# Patient Record
Sex: FEMALE | Race: WHITE | Employment: UNEMPLOYED | URBAN - METROPOLITAN AREA
[De-identification: names, ages, dates, MRNs, and addresses within clinical notes are randomized per-mention and may not be internally consistent; named-entity substitution may affect disease eponyms.]

---

## 2019-01-31 ENCOUNTER — HOSPITAL ENCOUNTER (OUTPATIENT)
Dept: MAMMOGRAPHY | Age: 59
Discharge: HOME | End: 2019-01-31
Attending: FAMILY MEDICINE
Payer: COMMERCIAL

## 2019-01-31 DIAGNOSIS — Z12.39 SCREENING BREAST EXAMINATION: ICD-10-CM

## 2019-01-31 PROCEDURE — 77067 SCR MAMMO BI INCL CAD: CPT

## 2020-03-18 ENCOUNTER — HOSPITAL ENCOUNTER (OUTPATIENT)
Dept: MAMMOGRAPHY | Age: 60
Discharge: HOME | End: 2020-03-18
Attending: FAMILY MEDICINE
Payer: COMMERCIAL

## 2020-03-18 DIAGNOSIS — Z12.31 VISIT FOR SCREENING MAMMOGRAM: ICD-10-CM

## 2020-03-18 PROCEDURE — 77067 SCR MAMMO BI INCL CAD: CPT

## 2021-01-22 ENCOUNTER — HOSPITAL ENCOUNTER (EMERGENCY)
Age: 61
Discharge: HOME | End: 2021-01-23
Attending: EMERGENCY MEDICINE
Payer: COMMERCIAL

## 2021-01-22 ENCOUNTER — APPOINTMENT (OUTPATIENT)
Dept: CT IMAGING | Age: 61
End: 2021-01-22
Attending: PHYSICIAN ASSISTANT
Payer: COMMERCIAL

## 2021-01-22 ENCOUNTER — APPOINTMENT (OUTPATIENT)
Dept: GENERAL RADIOLOGY | Age: 61
End: 2021-01-22
Attending: EMERGENCY MEDICINE
Payer: COMMERCIAL

## 2021-01-22 VITALS
HEART RATE: 70 BPM | TEMPERATURE: 96.9 F | RESPIRATION RATE: 18 BRPM | DIASTOLIC BLOOD PRESSURE: 69 MMHG | OXYGEN SATURATION: 98 % | SYSTOLIC BLOOD PRESSURE: 129 MMHG

## 2021-01-22 DIAGNOSIS — S46.912A LEFT SHOULDER STRAIN, INITIAL ENCOUNTER: Primary | ICD-10-CM

## 2021-01-22 DIAGNOSIS — M48.02 CERVICAL STENOSIS OF SPINE: ICD-10-CM

## 2021-01-22 PROCEDURE — 72125 CT NECK SPINE W/O DYE: CPT

## 2021-01-22 PROCEDURE — 74011250637 HC RX REV CODE- 250/637: Performed by: PHYSICIAN ASSISTANT

## 2021-01-22 PROCEDURE — 73030 X-RAY EXAM OF SHOULDER: CPT

## 2021-01-22 RX ORDER — VENLAFAXINE 100 MG/1
150 TABLET ORAL 3 TIMES DAILY
COMMUNITY
End: 2022-03-09

## 2021-01-22 RX ORDER — HYDROCODONE BITARTRATE AND ACETAMINOPHEN 5; 325 MG/1; MG/1
1 TABLET ORAL
Status: COMPLETED | OUTPATIENT
Start: 2021-01-22 | End: 2021-01-22

## 2021-01-22 RX ADMIN — HYDROCODONE BITARTRATE AND ACETAMINOPHEN 1 TABLET: 5; 325 TABLET ORAL at 22:58

## 2021-01-23 PROCEDURE — 99283 EMERGENCY DEPT VISIT LOW MDM: CPT

## 2021-01-23 RX ORDER — HYDROCODONE BITARTRATE AND ACETAMINOPHEN 5; 325 MG/1; MG/1
1 TABLET ORAL
Qty: 18 TAB | Refills: 0 | Status: SHIPPED | OUTPATIENT
Start: 2021-01-23 | End: 2021-01-28

## 2021-01-23 NOTE — ED PROVIDER NOTES
Jose Quiroz is a 61 y.o. female with PMH presents to emergency room ambulatory for evaluation of L shoulder pain radiating into left side of her neck after she tripped and fell this evening while walking her dog. Hx of L reverse total shoulder replacement done at [facility], has limited ROM since the fall due to pain. Denies numbness/tingling, weakness, extremity swelling, head injury, headache or dizziness. She does not take a blood thinner. Reverse total shoulder arthroplasty without evidence of complicating feature. PCP: Chad Fitzgerald MD    Surgical hx- see chart  Social hx- no tobbacco    The patient has no other complaints at this time.              Past Medical History:   Diagnosis Date    Arthritis     Nausea & vomiting     RODDY (obstructive sleep apnea)     on CPAP for last 3 y ears    Psychiatric disorder     DEPRESSION    Sleep apnea     uses c pap       Past Surgical History:   Procedure Laterality Date    HX GYN      LT OOPHORECTOMY    HX ORTHOPAEDIC      LLT MENISCUS CONDROPLASTY    HX ORTHOPAEDIC      LT ROTATOR CUFF 2 TIMES RT ROTATOR 1 TIME    HX SHOULDER REPLACEMENT Left     \"reverse procedure\"         Family History:   Problem Relation Age of Onset    Psychiatric Disorder Mother     Heart Disease Father        Social History     Socioeconomic History    Marital status: SINGLE     Spouse name: Not on file    Number of children: Not on file    Years of education: Not on file    Highest education level: Not on file   Occupational History    Not on file   Social Needs    Financial resource strain: Not on file    Food insecurity     Worry: Not on file     Inability: Not on file    Transportation needs     Medical: Not on file     Non-medical: Not on file   Tobacco Use    Smoking status: Never Smoker   Substance and Sexual Activity    Alcohol use: No    Drug use: Not on file    Sexual activity: Not on file   Lifestyle    Physical activity     Days per week: Not on file Minutes per session: Not on file    Stress: Not on file   Relationships    Social connections     Talks on phone: Not on file     Gets together: Not on file     Attends Restoration service: Not on file     Active member of club or organization: Not on file     Attends meetings of clubs or organizations: Not on file     Relationship status: Not on file    Intimate partner violence     Fear of current or ex partner: Not on file     Emotionally abused: Not on file     Physically abused: Not on file     Forced sexual activity: Not on file   Other Topics Concern    Not on file   Social History Narrative    Not on file         ALLERGIES: Sulfa (sulfonamide antibiotics)    Review of Systems   Constitutional: Negative. Negative for activity change, chills, fatigue and unexpected weight change. HENT: Negative for trouble swallowing. Respiratory: Negative for cough, chest tightness, shortness of breath and wheezing. Cardiovascular: Negative. Negative for chest pain and palpitations. Gastrointestinal: Negative. Negative for abdominal pain, diarrhea, nausea and vomiting. Genitourinary: Negative. Negative for dysuria, flank pain, frequency and hematuria. Musculoskeletal: Positive for arthralgias and neck pain. Negative for back pain and neck stiffness. Skin: Negative. Negative for color change and rash. Neurological: Negative. Negative for dizziness, numbness and headaches. All other systems reviewed and are negative. Vitals:    01/22/21 2117   BP: 129/69   Pulse: 70   Resp: 18   Temp: 96.9 °F (36.1 °C)   SpO2: 98%            Physical Exam  Vitals signs and nursing note reviewed. Constitutional:       General: She is not in acute distress. Appearance: She is well-developed. She is not toxic-appearing or diaphoretic. Comments: Thin WF   HENT:      Head: Normocephalic and atraumatic. Eyes:      General:         Right eye: No discharge. Left eye: No discharge. Conjunctiva/sclera: Conjunctivae normal.      Pupils: Pupils are equal, round, and reactive to light. Neck:      Musculoskeletal: Full passive range of motion without pain, normal range of motion and neck supple. Trachea: No tracheal tenderness. Comments: TTP of middle c-spine, L paracervical region and L trap. Cardiovascular:      Rate and Rhythm: Normal rate. Pulses: Normal pulses. Pulmonary:      Effort: Pulmonary effort is normal. No respiratory distress. Breath sounds: Normal breath sounds. Abdominal:      General: Bowel sounds are normal. There is no distension. Musculoskeletal:         General: Tenderness present. Comments: L shoulder; LROM due to pain. Distal pulses 2+. NVI throughout. Skin:     General: Skin is warm and dry. Capillary Refill: Capillary refill takes less than 2 seconds. Findings: No abrasion, erythema or rash. Neurological:      Mental Status: She is alert and oriented to person, place, and time. Cranial Nerves: No cranial nerve deficit. Sensory: No sensory deficit. Coordination: Coordination normal.   Psychiatric:         Speech: Speech normal.         Behavior: Behavior normal.          MDM  Number of Diagnoses or Management Options  Cervical stenosis of spine  Left shoulder strain, initial encounter  Diagnosis management comments:   Ddx: frx, sprain, dislocation       Amount and/or Complexity of Data Reviewed  Tests in the radiology section of CPT®: ordered and reviewed  Review and summarize past medical records: yes  Discuss the patient with other providers: yes    Patient Progress  Patient progress: stable         Procedures     I discussed patient's PMH, exam findings as well as careplan with the ER attending who agrees with care plan. Eduardo Scott PA-C     Findings from CT and x-ray discussed with patient. No obvious hardware complication. Will place in sling. Incidental findings from CT of the neck discussed. Will give a few Norco to take as needed for pain and encouraged follow-up with [facility] for evaluation next week. Return precautions discussed. IMAGING RESULTS:  Xr Shoulder Lt Ap/lat Min 2 V    Result Date: 1/22/2021  1. Reverse total shoulder arthroplasty without evidence of complicating feature. Ct Spine Cerv Wo Cont    Result Date: 1/22/2021  No acute abnormality. Cervical degenerative changes with mild spinal canal stenosis at C4-5 through C6-7 and moderate left foraminal stenosis at C4-5. MEDICATIONS GIVEN:  Medications   HYDROcodone-acetaminophen (NORCO) 5-325 mg per tablet 1 Tab (1 Tab Oral Given 1/22/21 5753)         DISCHARGE NOTE:  The patient's results have been reviewed with them and/or available family. Patient and/or family verbally conveyed their understanding and agreement of the patient's signs, symptoms, diagnosis, treatment and prognosis and additionally agree to follow up as recommended in the discharge instructions or to return to the Emergency Room should their condition change prior to their follow-up appointment. The patient/family verbally agrees with the care-plan and verbally conveys that all of their questions have been answered. The discharge instructions have also been provided to the patient and/or family with some educational information regarding the patient's diagnosis as well a list of reasons why the patient would want to return to the ER prior to their follow-up appointment, should their condition change. Plan:  1. F/U with ortho at [facility]  2. Rx norco  3.  Sling given, ice, rest  Return precautions discussed and advised to return to ER if worse

## 2021-01-23 NOTE — ED TRIAGE NOTES
Pulled down walking her dog just prior to arrival.She has severe pain left shoulder.  She has had a shoulder replacement left shoulder in the past.

## 2021-01-23 NOTE — ED NOTES
Pt given written and verbal discharge instructions, 1 efiled Rx; pt verbalized understanding of such. VSS at time of discharge. Belongings in pt possession at time of discharge. Pt ambulatory out of ED without difficulty in NAD. No complaints, needs, or questions at this time. Pt to call PCP, ortho ASAP for follow-up.

## 2021-02-24 ENCOUNTER — TRANSCRIBE ORDER (OUTPATIENT)
Dept: SCHEDULING | Age: 61
End: 2021-02-24

## 2021-02-24 DIAGNOSIS — Z12.31 SCREENING MAMMOGRAM FOR HIGH-RISK PATIENT: Primary | ICD-10-CM

## 2021-03-19 ENCOUNTER — HOSPITAL ENCOUNTER (OUTPATIENT)
Dept: MAMMOGRAPHY | Age: 61
Discharge: HOME | End: 2021-03-19
Attending: FAMILY MEDICINE
Payer: COMMERCIAL

## 2021-03-19 DIAGNOSIS — Z12.31 SCREENING MAMMOGRAM FOR HIGH-RISK PATIENT: ICD-10-CM

## 2021-03-19 PROCEDURE — 77063 BREAST TOMOSYNTHESIS BI: CPT

## 2021-11-28 ENCOUNTER — APPOINTMENT (OUTPATIENT)
Dept: CT IMAGING | Age: 61
End: 2021-11-28
Attending: EMERGENCY MEDICINE
Payer: MEDICAID

## 2021-11-28 ENCOUNTER — HOSPITAL ENCOUNTER (EMERGENCY)
Age: 61
Discharge: HOME | End: 2021-11-28
Attending: STUDENT IN AN ORGANIZED HEALTH CARE EDUCATION/TRAINING PROGRAM
Payer: MEDICAID

## 2021-11-28 VITALS
SYSTOLIC BLOOD PRESSURE: 116 MMHG | OXYGEN SATURATION: 99 % | DIASTOLIC BLOOD PRESSURE: 72 MMHG | HEART RATE: 64 BPM | RESPIRATION RATE: 15 BRPM | TEMPERATURE: 97.6 F

## 2021-11-28 DIAGNOSIS — S09.90XA CLOSED HEAD INJURY, INITIAL ENCOUNTER: ICD-10-CM

## 2021-11-28 DIAGNOSIS — S01.81XA FACIAL LACERATION, INITIAL ENCOUNTER: Primary | ICD-10-CM

## 2021-11-28 PROCEDURE — 75810000293 HC SIMP/SUPERF WND  RPR

## 2021-11-28 PROCEDURE — 72125 CT NECK SPINE W/O DYE: CPT

## 2021-11-28 PROCEDURE — 74011000250 HC RX REV CODE- 250: Performed by: EMERGENCY MEDICINE

## 2021-11-28 PROCEDURE — 99283 EMERGENCY DEPT VISIT LOW MDM: CPT

## 2021-11-28 PROCEDURE — 70450 CT HEAD/BRAIN W/O DYE: CPT

## 2021-11-28 RX ORDER — BACITRACIN 500 UNIT/G
1 PACKET (EA) TOPICAL
Status: COMPLETED | OUTPATIENT
Start: 2021-11-28 | End: 2021-11-28

## 2021-11-28 RX ADMIN — BACITRACIN 1 PACKET: 500 OINTMENT TOPICAL at 12:40

## 2021-11-28 NOTE — ED PROVIDER NOTES
Please note that this dictation was completed with [employer], the computer voice recognition software.  Quite often unanticipated grammatical, syntax, homophones, and other interpretive errors are inadvertently transcribed by the computer software.  Please disregard these errors.  Please excuse any errors that have escaped final proofreading. Patient is 64year old healthy female presenting to ED for evaluation of head injury and laceration. She states that about 45 minutes prior to arrival she was walking her dog, her dog pulled her forward, causing her to fall onto the grass. She believes that she scraped her face on the edge of her glasses. She denies loss of consciousness or any anticoagulant usage, but does have a mild headache right now. States that her tetanus immunization is up-to-date. Denies any additional medical complaints at this time.            Past Medical History:   Diagnosis Date    Arthritis     Nausea & vomiting     RODDY (obstructive sleep apnea)     on CPAP for last 3 y ears    Psychiatric disorder     DEPRESSION    Sleep apnea     uses c pap       Past Surgical History:   Procedure Laterality Date    HX GYN      LT OOPHORECTOMY    HX ORTHOPAEDIC      LLT MENISCUS CONDROPLASTY    HX ORTHOPAEDIC      LT ROTATOR CUFF 2 TIMES RT ROTATOR 1 TIME    HX SHOULDER REPLACEMENT Left     \"reverse procedure\"         Family History:   Problem Relation Age of Onset    Psychiatric Disorder Mother     Heart Disease Father        Social History     Socioeconomic History    Marital status: SINGLE     Spouse name: Not on file    Number of children: Not on file    Years of education: Not on file    Highest education level: Not on file   Occupational History    Not on file   Tobacco Use    Smoking status: Never Smoker    Smokeless tobacco: Not on file   Substance and Sexual Activity    Alcohol use: No    Drug use: Not on file    Sexual activity: Not on file   Other Topics Concern    Not on file   Social History Narrative    Not on file     Social Determinants of Health     Financial Resource Strain:     Difficulty of Paying Living Expenses: Not on file   Food Insecurity:     Worried About Running Out of Food in the Last Year: Not on file    Janet of Food in the Last Year: Not on file   Transportation Needs:     Lack of Transportation (Medical): Not on file    Lack of Transportation (Non-Medical): Not on file   Physical Activity:     Days of Exercise per Week: Not on file    Minutes of Exercise per Session: Not on file   Stress:     Feeling of Stress : Not on file   Social Connections:     Frequency of Communication with Friends and Family: Not on file    Frequency of Social Gatherings with Friends and Family: Not on file    Attends Catholic Services: Not on file    Active Member of [address] [place] [address] [employer] or Organizations: Not on file    Attends Club or Organization Meetings: Not on file    Marital Status: Not on file   Intimate Partner Violence:     Fear of Current or Ex-Partner: Not on file    Emotionally Abused: Not on file    Physically Abused: Not on file    Sexually Abused: Not on file   Housing Stability:     Unable to Pay for Housing in the Last Year: Not on file    Number of [place] in the Last Year: Not on file    Unstable Housing in the Last Year: Not on file         ALLERGIES: Sulfa (sulfonamide antibiotics)    Review of Systems   Constitutional: Negative for chills and fever. HENT: Negative for ear pain and sore throat. Eyes: Negative for visual disturbance. Respiratory: Negative for cough and shortness of breath. Cardiovascular: Negative for chest pain. Gastrointestinal: Negative for abdominal pain. Genitourinary: Negative for flank pain. Musculoskeletal: Negative for back pain. Skin: Positive for wound. Negative for color change. Neurological: Positive for headaches. Negative for dizziness. Psychiatric/Behavioral: Negative for confusion.        Vitals: 11/28/21 1230   BP: 116/72   Pulse: 64   Resp: 15   Temp: 97.6 °F (36.4 °C)   SpO2: 99%            Physical Exam  Vitals and nursing note reviewed. Constitutional:       General: She is not in acute distress. Appearance: Normal appearance. She is not ill-appearing. HENT:      Head: Normocephalic. Laceration (4 cm superficial skin avulsion to the left eyebrow, bleeding controlled, no bony instability. No deep laceration.) present. No raccoon eyes, Figueredo's sign or contusion. Jaw: There is normal jaw occlusion. Right Ear: Tympanic membrane normal.      Left Ear: Tympanic membrane normal.      Mouth/Throat:      Pharynx: Oropharynx is clear. Eyes:      General: Vision grossly intact. Extraocular Movements: Extraocular movements intact. Conjunctiva/sclera: Conjunctivae normal.      Pupils: Pupils are equal, round, and reactive to light. Neck:      Trachea: Phonation normal.   Cardiovascular:      Rate and Rhythm: Normal rate and regular rhythm. Heart sounds: Normal heart sounds. Pulmonary:      Effort: Pulmonary effort is normal.      Breath sounds: Normal breath sounds and air entry. Abdominal:      Palpations: Abdomen is soft. Tenderness: There is no abdominal tenderness. Musculoskeletal:         General: Normal range of motion. Cervical back: Normal range of motion. Skin:     General: Skin is warm and dry. Neurological:      General: No focal deficit present. Mental Status: She is alert and oriented to person, place, and time. Cranial Nerves: Cranial nerves are intact. Sensory: Sensation is intact. Motor: Motor function is intact. Coordination: Coordination is intact. Gait: Gait is intact.    Psychiatric:         Attention and Perception: Attention normal.         Mood and Affect: Mood normal.         Speech: Speech normal.          MDM  Number of Diagnoses or Management Options  Closed head injury, initial encounter  Facial laceration, initial encounter  Diagnosis management comments: Patient is alert, afebrile, vitals stable. Presents after mechanical ground-level fall, no loss of consciousness or anticoagulant use. No external signs of skull fracture or focal neurologic deficits on exam.  Sustained a superficial avulsion to the left eyebrow region, likely scraped from the edge of her glasses, wound cleaned and Steri-Strips applied to part of the wound to help with cosmesis, no deep laceration requiring suturing. Bacitracin and bandage applied. Head CT clear. Patient discharged from ED and will follow up with her PCP as needed. Return precautions outlined. All questions answered at this time. ED Course as of 11/28/21 1334   Sun Nov 28, 2021   1316 CT SPINE CERV WO CONT  IMPRESSION     1. No acute osseous abnormality. 2. Multilevel degenerative spondylosis. [EP]   3930 CT HEAD WO CONT  IMPRESSION  No acute intracranial abnormality.    [EP]      ED Course User Index  [EP] ARMANI Najera       Pt has been reevaluated. There are no new complaints, changes, or physical findings at this time. All results have been reviewed with patient and/or family. Medications have been reviewed w/ pt and/or family. Pt and/or family's questions have been answered. Pt and/or family expressed good understanding of the dx/tx/rx and is in agreement with plan of care. Pt instructed and agreed to f/u w/ PCP and to return to ED upon further deterioration. Pt is ready for discharge. IMPRESSION:  1. Facial laceration, initial encounter    2. Closed head injury, initial encounter        PLAN:  1. Discharge Medication List as of 11/28/2021  1:47 PM        2.    Follow-up Information     Follow up With Specialties Details Why Contact Info    Brittni Bean MD Family Medicine Go to  As needed [place] [address]  [phone]              Return to ED if worse     Wound Closure by Adhesive    Date/Time: 11/28/2021 1:47 PM  Performed by: ARMANI Mariscal  Authorized by: ARMANI Mariscal     Consent:     Consent obtained:  Verbal    Consent given by:  Patient    Risks discussed:  Pain and poor cosmetic result    Alternatives discussed:  No treatment  Anesthesia (see MAR for exact dosages): Anesthesia method:  None  Laceration details:     Location:  Face    Face location:  L eyebrow    Length (cm):  3  Repair type:     Repair type:  Simple  Exploration:     Hemostasis achieved with:  Direct pressure    Contaminated: no    Treatment:     Area cleansed with:  Hibiclens    Amount of cleaning:  Standard    Irrigation solution:  Sterile saline    Irrigation method:  Pressure wash    Visualized foreign bodies/material removed: no    Skin repair:     Repair method:  Steri-Strips    Number of Steri-Strips:  3  Approximation:     Approximation:  Close  Post-procedure details:     Dressing:  Antibiotic ointment and adhesive bandage    Patient tolerance of procedure:   Tolerated well, no immediate complications

## 2021-11-28 NOTE — DISCHARGE INSTRUCTIONS
Lacerations: Your laceration was repaired with three steri-strips. They will fall off on their own over the next week. Keep the wound dry for 24 hours unless it gets dirty. Wash your lacerations with an antibacterial soap and water. Pat dry. Cover with a layer of antibacterial ointment and cover with bandage. Do this twice daily until healed. Once your wound has healed you should apply sunscreen over the scar for the next year to prevent further scarring while the skin fully heals.      Return to ER with any concern for wound infection: redness, significant swelling, pus-like discharge

## 2021-12-02 ENCOUNTER — TRANSCRIBE ORDER (OUTPATIENT)
Dept: REGISTRATION | Age: 61
End: 2021-12-02

## 2021-12-02 DIAGNOSIS — Z01.812 PRE-PROCEDURE LAB EXAM: Primary | ICD-10-CM

## 2021-12-07 ENCOUNTER — HOSPITAL ENCOUNTER (OUTPATIENT)
Dept: PREADMISSION TESTING | Age: 61
Discharge: HOME | End: 2021-12-07
Attending: PODIATRIST
Payer: MEDICAID

## 2021-12-07 DIAGNOSIS — Z01.812 PRE-PROCEDURE LAB EXAM: ICD-10-CM

## 2021-12-07 PROCEDURE — U0005 INFEC AGEN DETEC AMPLI PROBE: HCPCS

## 2021-12-08 LAB
SARS-COV-2, XPLCVT: NOT DETECTED
SOURCE, COVRS: NORMAL

## 2021-12-09 ENCOUNTER — ANESTHESIA EVENT (OUTPATIENT)
Dept: SURGERY | Age: 61
End: 2021-12-09
Payer: MEDICAID

## 2021-12-10 ENCOUNTER — ANESTHESIA (OUTPATIENT)
Dept: SURGERY | Age: 61
End: 2021-12-10
Payer: MEDICAID

## 2021-12-10 ENCOUNTER — HOSPITAL ENCOUNTER (OUTPATIENT)
Age: 61
Setting detail: OUTPATIENT SURGERY
Discharge: HOME | End: 2021-12-10
Attending: PODIATRIST | Admitting: PODIATRIST
Payer: MEDICAID

## 2021-12-10 ENCOUNTER — APPOINTMENT (OUTPATIENT)
Dept: GENERAL RADIOLOGY | Age: 61
End: 2021-12-10
Attending: PODIATRIST
Payer: MEDICAID

## 2021-12-10 VITALS
WEIGHT: 135 LBS | TEMPERATURE: 98.1 F | OXYGEN SATURATION: 92 % | SYSTOLIC BLOOD PRESSURE: 93 MMHG | BODY MASS INDEX: 21.69 KG/M2 | RESPIRATION RATE: 12 BRPM | DIASTOLIC BLOOD PRESSURE: 56 MMHG | HEIGHT: 66 IN | HEART RATE: 63 BPM

## 2021-12-10 LAB — HGB BLD-MCNC: 14.1 G/DL (ref 11.5–16)

## 2021-12-10 PROCEDURE — 74011250636 HC RX REV CODE- 250/636: Performed by: PODIATRIST

## 2021-12-10 PROCEDURE — C1769 GUIDE WIRE: HCPCS | Performed by: PODIATRIST

## 2021-12-10 PROCEDURE — 76210000006 HC OR PH I REC 0.5 TO 1 HR: Performed by: PODIATRIST

## 2021-12-10 PROCEDURE — 77030031139 HC SUT VCRL2 J&J -A: Performed by: PODIATRIST

## 2021-12-10 PROCEDURE — 77030020754 HC CUF TRNQT 2BLA STRY -B: Performed by: PODIATRIST

## 2021-12-10 PROCEDURE — 77030002916 HC SUT ETHLN J&J -A: Performed by: PODIATRIST

## 2021-12-10 PROCEDURE — 76210000020 HC REC RM PH II FIRST 0.5 HR: Performed by: PODIATRIST

## 2021-12-10 PROCEDURE — 74011250636 HC RX REV CODE- 250/636: Performed by: ANESTHESIOLOGY

## 2021-12-10 PROCEDURE — 77030034564: Performed by: PODIATRIST

## 2021-12-10 PROCEDURE — 77030002933 HC SUT MCRYL J&J -A: Performed by: PODIATRIST

## 2021-12-10 PROCEDURE — 74011250637 HC RX REV CODE- 250/637: Performed by: ANESTHESIOLOGY

## 2021-12-10 PROCEDURE — 77030010396 HC WRE FIX C CNMD -A: Performed by: PODIATRIST

## 2021-12-10 PROCEDURE — 74011000250 HC RX REV CODE- 250: Performed by: ANESTHESIOLOGY

## 2021-12-10 PROCEDURE — 77030000032 HC CUF TRNQT ZIMM -B: Performed by: PODIATRIST

## 2021-12-10 PROCEDURE — 77030003748: Performed by: PODIATRIST

## 2021-12-10 PROCEDURE — 77030013079 HC BLNKT BAIR HGGR 3M -A: Performed by: ANESTHESIOLOGY

## 2021-12-10 PROCEDURE — 77030040922 HC BLNKT HYPOTHRM STRY -A

## 2021-12-10 PROCEDURE — 2709999900 HC NON-CHARGEABLE SUPPLY: Performed by: PODIATRIST

## 2021-12-10 PROCEDURE — 76060000035 HC ANESTHESIA 2 TO 2.5 HR: Performed by: PODIATRIST

## 2021-12-10 PROCEDURE — 85018 HEMOGLOBIN: CPT

## 2021-12-10 PROCEDURE — 74011250636 HC RX REV CODE- 250/636: Performed by: NURSE ANESTHETIST, CERTIFIED REGISTERED

## 2021-12-10 PROCEDURE — 74011000250 HC RX REV CODE- 250: Performed by: PODIATRIST

## 2021-12-10 PROCEDURE — C1713 ANCHOR/SCREW BN/BN,TIS/BN: HCPCS | Performed by: PODIATRIST

## 2021-12-10 PROCEDURE — 77030006773 HC BLD SAW OSC BRSM -A: Performed by: PODIATRIST

## 2021-12-10 PROCEDURE — 76010000153 HC OR TIME 1.5 TO 2 HR: Performed by: PODIATRIST

## 2021-12-10 DEVICE — SCREW BNE L18MM DIA2.5MM MIC FT ANK TI SELF DRL ST CANN: Type: IMPLANTABLE DEVICE | Site: FOOT | Status: FUNCTIONAL

## 2021-12-10 DEVICE — SCR BNE COMP FT MIC 2.5X16MM --: Type: IMPLANTABLE DEVICE | Site: FOOT | Status: FUNCTIONAL

## 2021-12-10 RX ORDER — BUPIVACAINE HYDROCHLORIDE 5 MG/ML
INJECTION, SOLUTION EPIDURAL; INTRACAUDAL AS NEEDED
Status: DISCONTINUED | OUTPATIENT
Start: 2021-12-10 | End: 2021-12-10 | Stop reason: HOSPADM

## 2021-12-10 RX ORDER — ONDANSETRON 2 MG/ML
INJECTION INTRAMUSCULAR; INTRAVENOUS AS NEEDED
Status: DISCONTINUED | OUTPATIENT
Start: 2021-12-10 | End: 2021-12-10 | Stop reason: HOSPADM

## 2021-12-10 RX ORDER — ROPIVACAINE HYDROCHLORIDE 5 MG/ML
30 INJECTION, SOLUTION EPIDURAL; INFILTRATION; PERINEURAL AS NEEDED
Status: DISCONTINUED | OUTPATIENT
Start: 2021-12-10 | End: 2021-12-10 | Stop reason: HOSPADM

## 2021-12-10 RX ORDER — PHENYLEPHRINE HCL IN 0.9% NACL 0.4MG/10ML
SYRINGE (ML) INTRAVENOUS AS NEEDED
Status: DISCONTINUED | OUTPATIENT
Start: 2021-12-10 | End: 2021-12-10 | Stop reason: HOSPADM

## 2021-12-10 RX ORDER — HYDROMORPHONE HYDROCHLORIDE 1 MG/ML
0.2 INJECTION, SOLUTION INTRAMUSCULAR; INTRAVENOUS; SUBCUTANEOUS
Status: DISCONTINUED | OUTPATIENT
Start: 2021-12-10 | End: 2021-12-10 | Stop reason: HOSPADM

## 2021-12-10 RX ORDER — MIDAZOLAM HYDROCHLORIDE 1 MG/ML
1 INJECTION, SOLUTION INTRAMUSCULAR; INTRAVENOUS AS NEEDED
Status: DISCONTINUED | OUTPATIENT
Start: 2021-12-10 | End: 2021-12-10 | Stop reason: HOSPADM

## 2021-12-10 RX ORDER — SODIUM CHLORIDE 9 MG/ML
25 INJECTION, SOLUTION INTRAVENOUS CONTINUOUS
Status: DISCONTINUED | OUTPATIENT
Start: 2021-12-10 | End: 2021-12-10 | Stop reason: HOSPADM

## 2021-12-10 RX ORDER — PROPOFOL 10 MG/ML
INJECTION, EMULSION INTRAVENOUS AS NEEDED
Status: DISCONTINUED | OUTPATIENT
Start: 2021-12-10 | End: 2021-12-10 | Stop reason: HOSPADM

## 2021-12-10 RX ORDER — FENTANYL CITRATE 50 UG/ML
50 INJECTION, SOLUTION INTRAMUSCULAR; INTRAVENOUS AS NEEDED
Status: DISCONTINUED | OUTPATIENT
Start: 2021-12-10 | End: 2021-12-10 | Stop reason: HOSPADM

## 2021-12-10 RX ORDER — ONDANSETRON 2 MG/ML
4 INJECTION INTRAMUSCULAR; INTRAVENOUS AS NEEDED
Status: DISCONTINUED | OUTPATIENT
Start: 2021-12-10 | End: 2021-12-10 | Stop reason: HOSPADM

## 2021-12-10 RX ORDER — SODIUM CHLORIDE, SODIUM LACTATE, POTASSIUM CHLORIDE, CALCIUM CHLORIDE 600; 310; 30; 20 MG/100ML; MG/100ML; MG/100ML; MG/100ML
1000 INJECTION, SOLUTION INTRAVENOUS CONTINUOUS
Status: DISCONTINUED | OUTPATIENT
Start: 2021-12-10 | End: 2021-12-10 | Stop reason: HOSPADM

## 2021-12-10 RX ORDER — DIPHENHYDRAMINE HYDROCHLORIDE 50 MG/ML
12.5 INJECTION, SOLUTION INTRAMUSCULAR; INTRAVENOUS AS NEEDED
Status: DISCONTINUED | OUTPATIENT
Start: 2021-12-10 | End: 2021-12-10 | Stop reason: HOSPADM

## 2021-12-10 RX ORDER — MORPHINE SULFATE 2 MG/ML
2 INJECTION, SOLUTION INTRAMUSCULAR; INTRAVENOUS
Status: DISCONTINUED | OUTPATIENT
Start: 2021-12-10 | End: 2021-12-10 | Stop reason: HOSPADM

## 2021-12-10 RX ORDER — FENTANYL CITRATE 50 UG/ML
INJECTION, SOLUTION INTRAMUSCULAR; INTRAVENOUS AS NEEDED
Status: DISCONTINUED | OUTPATIENT
Start: 2021-12-10 | End: 2021-12-10 | Stop reason: HOSPADM

## 2021-12-10 RX ORDER — MIDAZOLAM HYDROCHLORIDE 1 MG/ML
INJECTION, SOLUTION INTRAMUSCULAR; INTRAVENOUS AS NEEDED
Status: DISCONTINUED | OUTPATIENT
Start: 2021-12-10 | End: 2021-12-10 | Stop reason: HOSPADM

## 2021-12-10 RX ORDER — GLYCOPYRROLATE 0.2 MG/ML
0.2 INJECTION INTRAMUSCULAR; INTRAVENOUS
Status: DISCONTINUED | OUTPATIENT
Start: 2021-12-10 | End: 2021-12-10 | Stop reason: HOSPADM

## 2021-12-10 RX ORDER — LIDOCAINE HYDROCHLORIDE 10 MG/ML
INJECTION INFILTRATION; PERINEURAL AS NEEDED
Status: DISCONTINUED | OUTPATIENT
Start: 2021-12-10 | End: 2021-12-10 | Stop reason: HOSPADM

## 2021-12-10 RX ORDER — FENTANYL CITRATE 50 UG/ML
25 INJECTION, SOLUTION INTRAMUSCULAR; INTRAVENOUS
Status: DISCONTINUED | OUTPATIENT
Start: 2021-12-10 | End: 2021-12-10 | Stop reason: HOSPADM

## 2021-12-10 RX ORDER — MIDAZOLAM HYDROCHLORIDE 1 MG/ML
0.5 INJECTION, SOLUTION INTRAMUSCULAR; INTRAVENOUS
Status: DISCONTINUED | OUTPATIENT
Start: 2021-12-10 | End: 2021-12-10 | Stop reason: HOSPADM

## 2021-12-10 RX ORDER — DEXAMETHASONE SODIUM PHOSPHATE 4 MG/ML
INJECTION, SOLUTION INTRA-ARTICULAR; INTRALESIONAL; INTRAMUSCULAR; INTRAVENOUS; SOFT TISSUE AS NEEDED
Status: DISCONTINUED | OUTPATIENT
Start: 2021-12-10 | End: 2021-12-10 | Stop reason: HOSPADM

## 2021-12-10 RX ORDER — LIDOCAINE HYDROCHLORIDE 10 MG/ML
0.1 INJECTION, SOLUTION EPIDURAL; INFILTRATION; INTRACAUDAL; PERINEURAL AS NEEDED
Status: DISCONTINUED | OUTPATIENT
Start: 2021-12-10 | End: 2021-12-10 | Stop reason: HOSPADM

## 2021-12-10 RX ORDER — HYDROCODONE BITARTRATE AND ACETAMINOPHEN 5; 325 MG/1; MG/1
1 TABLET ORAL AS NEEDED
Status: DISCONTINUED | OUTPATIENT
Start: 2021-12-10 | End: 2021-12-10 | Stop reason: HOSPADM

## 2021-12-10 RX ORDER — ALBUTEROL SULFATE 0.83 MG/ML
2.5 SOLUTION RESPIRATORY (INHALATION) AS NEEDED
Status: DISCONTINUED | OUTPATIENT
Start: 2021-12-10 | End: 2021-12-10 | Stop reason: HOSPADM

## 2021-12-10 RX ORDER — PROPOFOL 10 MG/ML
INJECTION, EMULSION INTRAVENOUS
Status: DISCONTINUED | OUTPATIENT
Start: 2021-12-10 | End: 2021-12-10 | Stop reason: HOSPADM

## 2021-12-10 RX ORDER — LIDOCAINE HYDROCHLORIDE 20 MG/ML
INJECTION, SOLUTION EPIDURAL; INFILTRATION; INTRACAUDAL; PERINEURAL AS NEEDED
Status: DISCONTINUED | OUTPATIENT
Start: 2021-12-10 | End: 2021-12-10 | Stop reason: HOSPADM

## 2021-12-10 RX ORDER — ACETAMINOPHEN 325 MG/1
650 TABLET ORAL ONCE
Status: COMPLETED | OUTPATIENT
Start: 2021-12-10 | End: 2021-12-10

## 2021-12-10 RX ADMIN — PROPOFOL 50 MG: 10 INJECTION, EMULSION INTRAVENOUS at 08:49

## 2021-12-10 RX ADMIN — PROPOFOL 25 MG: 10 INJECTION, EMULSION INTRAVENOUS at 07:51

## 2021-12-10 RX ADMIN — PROPOFOL 25 MG: 10 INJECTION, EMULSION INTRAVENOUS at 08:01

## 2021-12-10 RX ADMIN — FENTANYL CITRATE 50 MCG: 50 INJECTION, SOLUTION INTRAMUSCULAR; INTRAVENOUS at 07:53

## 2021-12-10 RX ADMIN — Medication 40 MCG: at 08:15

## 2021-12-10 RX ADMIN — MIDAZOLAM HYDROCHLORIDE 2 MG: 1 INJECTION, SOLUTION INTRAMUSCULAR; INTRAVENOUS at 07:36

## 2021-12-10 RX ADMIN — WATER 2 G: 1 INJECTION INTRAMUSCULAR; INTRAVENOUS; SUBCUTANEOUS at 07:49

## 2021-12-10 RX ADMIN — Medication 80 MCG: at 08:16

## 2021-12-10 RX ADMIN — ONDANSETRON HYDROCHLORIDE 4 MG: 2 INJECTION, SOLUTION INTRAMUSCULAR; INTRAVENOUS at 09:05

## 2021-12-10 RX ADMIN — MEPERIDINE HYDROCHLORIDE 12.5 MG: 50 INJECTION INTRAMUSCULAR; INTRAVENOUS; SUBCUTANEOUS at 08:54

## 2021-12-10 RX ADMIN — LIDOCAINE HYDROCHLORIDE 40 MG: 20 INJECTION, SOLUTION EPIDURAL; INFILTRATION; INTRACAUDAL; PERINEURAL at 07:45

## 2021-12-10 RX ADMIN — PROPOFOL 50 MCG/KG/MIN: 10 INJECTION, EMULSION INTRAVENOUS at 07:45

## 2021-12-10 RX ADMIN — FENTANYL CITRATE 25 MCG: 50 INJECTION, SOLUTION INTRAMUSCULAR; INTRAVENOUS at 07:48

## 2021-12-10 RX ADMIN — MIDAZOLAM HYDROCHLORIDE 2 MG: 1 INJECTION, SOLUTION INTRAMUSCULAR; INTRAVENOUS at 07:40

## 2021-12-10 RX ADMIN — PROPOFOL 25 MG: 10 INJECTION, EMULSION INTRAVENOUS at 07:45

## 2021-12-10 RX ADMIN — PROPOFOL 25 MG: 10 INJECTION, EMULSION INTRAVENOUS at 07:46

## 2021-12-10 RX ADMIN — PROPOFOL 25 MG: 10 INJECTION, EMULSION INTRAVENOUS at 07:49

## 2021-12-10 RX ADMIN — SODIUM CHLORIDE, POTASSIUM CHLORIDE, SODIUM LACTATE AND CALCIUM CHLORIDE: 600; 310; 30; 20 INJECTION, SOLUTION INTRAVENOUS at 09:03

## 2021-12-10 RX ADMIN — PROPOFOL 25 MG: 10 INJECTION, EMULSION INTRAVENOUS at 07:48

## 2021-12-10 RX ADMIN — FENTANYL CITRATE 25 MCG: 50 INJECTION, SOLUTION INTRAMUSCULAR; INTRAVENOUS at 07:36

## 2021-12-10 RX ADMIN — MEPERIDINE HYDROCHLORIDE 12.5 MG: 50 INJECTION INTRAMUSCULAR; INTRAVENOUS; SUBCUTANEOUS at 08:50

## 2021-12-10 RX ADMIN — DEXAMETHASONE SODIUM PHOSPHATE 4 MG: 4 INJECTION, SOLUTION INTRAMUSCULAR; INTRAVENOUS at 08:12

## 2021-12-10 RX ADMIN — SODIUM CHLORIDE, POTASSIUM CHLORIDE, SODIUM LACTATE AND CALCIUM CHLORIDE 1000 ML: 600; 310; 30; 20 INJECTION, SOLUTION INTRAVENOUS at 06:39

## 2021-12-10 RX ADMIN — ACETAMINOPHEN 650 MG: 325 TABLET ORAL at 06:39

## 2021-12-10 RX ADMIN — MIDAZOLAM HYDROCHLORIDE 1 MG: 1 INJECTION, SOLUTION INTRAMUSCULAR; INTRAVENOUS at 07:45

## 2021-12-10 NOTE — BRIEF OP NOTE
Brief Postoperative Note    Patient: Michael Rai  YOB: 1960  MRN: [id number]    Date of Procedure: 12/10/2021     Pre-Op Diagnosis: HALLUX VALGIS RIGHT FOOT, HAMMERTOE RIGHT FOOT, RIGHT FOOT PAIN    Post-Op Diagnosis: Same as preoperative diagnosis. Procedure(s):  RIGHT BUNIONECTOMY WITH OSTEOTOMY, RIGHT SECOND AND THIRD HAMMERTOE REPAIR WITH PINNING  Surgeon(s):  Joseph Richards DPM    Surgical Assistant: Dale Pantoja    Anesthesia: MAC     Estimated Blood Loss (mL): Minimal    Complications: None    Specimens: * No specimens in log *     Implants:   Implant Name Type Inv.  Item Serial No.  Lot No. LRB No. Used Action   SCR BNE COMP FT NIRMAL 2.5X16MM --  - SN/A  SCR BNE COMP FT NIRMAL 2.5X16MM --  N/A [employer] PER- Right 1 Implanted   SCR BNE COMP FT NIRMAL 2.5X18MM --  - SN/A  SCR BNE COMP FT NIRMAL 2.5X18MM --  N/A [employer] PER- Right 1 Implanted       Drains: * No LDAs found *    Findings: hammertoes right 2nd and 3rd toes right, hallux valgus right foot    Electronically Signed by Kuldeep Hairston DPM on 12/10/2021 at 9:47 AM

## 2021-12-10 NOTE — BRIEF OP NOTE
Brief Postoperative Note    Patient: Keyshawn Edward  YOB: 1960  MRN: [id number]    Date of Procedure: 12/10/2021     Pre-Op Diagnosis:   1. HALLUX VALGuS RIGHT FOOT  2. HAMMERTOE 2 RIGHT FOOT  3. Hammertoe 3,, RIGHT FOOT   4. Pain in right foot    Post-Op Diagnosis: Same as preoperative diagnosis. Procedure(s):  1. RIGHT BUNIONECTOMY WITH OSTEOTOMY,   2. RIGHT SECOND Hammertoe arthrodesis with pinning  3. THIRD HAMMERTOE PIPJ arthrodesis WITH PINNING  Surgeon(s):  Chen Bullard DPM    Surgical Assistant: Indu Wilson DPM (Fellow)    Anesthesia: MAC with 20 cc of 1% lidocaine plain    Estimated Blood Loss (mL): minimal    Complications: none    Specimens: none    Implants:   Implant Name Type Inv. Item Serial No.  Lot No. LRB No. Used Action   SCR BNE COMP FT NIRMAL 2.5X16MM --  - SN/A  SCR BNE COMP FT NIRMAL 2.5X16MM --  N/A [employer]- Right 1 Implanted   SCR BNE COMP FT NIRMAL 2.5X18MM --  - SN/A  SCR BNE COMP FT NIRMAL 2.5X18MM --  N/A [employer] PER- Right 1 Implanted       Drains: none    Findings: HAV and hammertoes.  Healthy cartilage of 1st MTPJ    Electronically Signed by Ana M Ace MD on 12/10/2021 at 9:39 AM

## 2021-12-10 NOTE — H&P
Foot and Ankle Surgery History and Physical    Subjective:      Nilsa Khan is a 64 y.o. female who presents for surgery for right foot bunionectomy and hammertoe correction and contracture of metatarsophalangeal joints 2 and 3 on the right foot. She has been known to Dr. Alis Varghese for some time now. She is followed up with him in the office for these issues. Multiple conservative treatments have been tried and failed. She endorses pain with activities at the bunion and at the top of her \"knuckles\" of toes 2 and 3 on the right foot. Denies any other medical problems besides depression. She has never smoked cigarettes. Past Medical History:   Diagnosis Date    Arthritis     Nausea & vomiting     RODDY (obstructive sleep apnea)     on CPAP for last 3 y ears    Psychiatric disorder     DEPRESSION    Sleep apnea     uses c pap     Past Surgical History:   Procedure Laterality Date    HX GYN      LT OOPHORECTOMY    HX ORTHOPAEDIC      LLT MENISCUS CONDROPLASTY    HX ORTHOPAEDIC      LT ROTATOR CUFF 2 TIMES RT ROTATOR 1 TIME    HX SHOULDER REPLACEMENT Left     \"reverse procedure\"      Family History   Problem Relation Age of Onset    Psychiatric Disorder Mother     Heart Disease Father      Social History     Tobacco Use    Smoking status: Never Smoker    Smokeless tobacco: Never Used   Substance Use Topics    Alcohol use: No      Prior to Admission medications    Medication Sig Start Date End Date Taking? Authorizing Provider   venlafaxine (EFFEXOR) 100 mg tablet Take 150 mg by mouth three (3) times daily. Yes Other, MD Luna   bupropion HCl (WELLBUTRIN PO) Take  by mouth. Yes Other, MD Luna   LAMOTRIGINE (LAMICTAL PO) Take 300 mg by mouth every morning.    Yes Provider, Historical      Allergies   Allergen Reactions    Sulfa (Sulfonamide Antibiotics) Unknown (comments)     Eyes turned bright red       Review of Systems   Neg except as reviewed above      Objective:     Patient Vitals for the past 8 hrs:   BP Temp Pulse Resp SpO2 Height Weight   12/10/21 0556 103/66 98.1 °F (36.7 °C) 61 17 96 % 5' 5.5\" (1.664 m) 61.2 kg (135 lb)       Temp (24hrs), Av.1 °F (36.7 °C), Min:98.1 °F (36.7 °C), Max:98.1 °F (36.7 °C)      Physical Exam   Gen: A&Ox3  Resp: NLB on RA  Cards: HR normal, pulses palpable  Abd: NT / ND    Right foot hallux valgus. Not hypermobile. Hammertoe contractures of toes 2 and 3 that are flexible. Flexible contracture of metatarsophalangeal joints 2 and 3. Callusing dorsal erythema dorsal aspect of PIPJ's of toes 2 and 3 and over medial eminence. Assessment:     #Right foot Bunion  #Right foot hallux valgus  #Right foot hammertoe 2  #Right foot Hammertoe 3  #Right foot MTPJ contracture 2  #Right foot MTPJ contracture 3      Plan:     Plan for surgical correction of right foot bunion with chevron osteotomy and arthroplasty versus arthrodesis toes 2 and 3 with release of metatarsophalangeal joints 2 and 3 with K wire fixation    Discussed the risk of surgery including but not limited to nonunion, malunion, infection, pain/swelling, damage to local nerves and blood vessels, DVT/PE, MI, loss of limb, loss of life,  and the risks of general anesthetic. The patient understands the risks; any and all questions were answered to the patient's satisfaction.       THIS IS THE FOOT AND ANKLE SURGERY SERVICE    Jakob Martinez DPM, [facility]  Foot and Ankle Surgery Fellow  [place] [facility] Reconstructive, Revision, and Limb Preservation Surgery of the Foot and Ankle

## 2021-12-10 NOTE — OP NOTES
Foot and Ankle Surgery Operative Report    Date of Procedure: 12/10/2021     Pre-Op Diagnosis:   1. HALLUX VALGuS RIGHT FOOT  2. HAMMERTOE 2 RIGHT FOOT  3. Hammertoe 3,, RIGHT FOOT   4. Pain in right foot    Post-Op Diagnosis: Same as preoperative diagnosis. Procedure(s):  1. RIGHT BUNIONECTOMY WITH OSTEOTOMY,   2. RIGHT SECOND Hammertoe arthrodesis with pinning  3. THIRD HAMMERTOE PIPJ arthrodesis WITH PINNING  Surgeon(s):  Kevin Martin DPM    Surgical Assistant: Omer Lion DPM (Fellow)    Anesthesia: MAC with 20 cc of 1% lidocaine plain    Estimated Blood Loss (mL): minimal    Complications: none    Specimens: none    Implants:   Implant Name Type Inv. Item Serial No.  Lot No. LRB No. Used Action   SCR BNE COMP FT NIRMAL 2.5X16MM --  - SN/A  SCR BNE COMP FT NIRMAL 2.5X16MM --  N/A [employer] PER- Right 1 Implanted   SCR BNE COMP FT NIRMAL 2.5X18MM --  - SN/A  SCR BNE COMP FT NIRMAL 2.5X18MM --  N/A [employer] PER- Right 1 Implanted       Drains: none    Findings: HAV and hammertoes. Healthy cartilage of 1st MTPJ    Indications for surgery: Horacio Olson is a 10-CRRU-IAD lady otherwise healthy who is been complaining of painful right bunion and painful hammertoes 2 and 3 on the right foot for extensive period of time. Multiple conservative interventions have been tried and failed. She was consented in the preoperative area. All questions were answered no guarantees were given. Patient was brought to the operating room placed on the operating table in supine position. An ankle tourniquet was applied and padded copiously. Surgical timeout ensued according to standard protocol. Local anesthesia was infiltrated with 20 cc of 1% lidocaine plain. The patient's operative extremity was then scrubbed, prepped, and draped in the usual aseptic manner.     Attention was directed to the operative foot where hallux valgus deformity was noted with hammertoe contractures of the PIPJ of toes 2 and 3 on the same foot. We began with a dorsal incision just para medial to the EHL tendon. We deepened our dissection to the subcutaneous structures bluntly and then made a bold incision down to bone. We elevated the periosteal capsular structures dorsally medially and laterally. We then retracted the EHL tendon medially and performed a lateral release with a 15 blade. We performed a lateral capsulotomy and then we released the attachments to the fibular sesamoid as well as the adductor tendon. We then turned our attention back to the the medial eminence. We utilized a McGlamry elevator to free up all of the plantar attachments. Then with good retraction we resected the medial eminence. Then using an axis guide oriented toward the head of the second metatarsal based on her preoperative parabola we made a a long arm chevron osteotomy through and through. We then translated the capital fragment laterally and we achieved temporary fixation with 3 K wires. We then followed standard fixation technique with Arthrex 2.5mm, FT screws x2. We confirmed our positioning both clinically and radiographically. We then performed a capsulorrhaphy for hallux abductus and confirmed complete resolution of our deformity. We then turned our attention toward to toe to where a hammertoe contracture was obvious at the PIPJ. We made a linear incision through skin and deepened our dissection to the subcutaneous tissues. We elevated the skin medially and laterally. We then performed a transverse extensor tenotomy and performed PIPJ capsulotomy was standard J stroke technique releasing the the collateral ligaments. We then used a sagittal saw to resect the head of the proximal phalanx at the surgical neck and resected just the cartilage on articular surface off of the base of the middle phalanx.   We then fixated this and achieved compression with a 0.045 K wire in standard fashion thrown antegrade and then retrograded back into the base of the proximal phalanx. We confirmed our positioning both clinically and radiographically and found this to be excellent. We then did the same exact procedure to toe 3. We made a standard in linear incision. We and deepened our dissection to the subcutaneous structures. We performed an extensor tenotomy and then a PIPJ capsulotomy where we released the collateral ligaments. We then used a sagittal saw to resect the head of the proximal phalanx at the surgical neck in the articular surface of the base of the middle phalanx. We then through a 0.045 K wire antegrade and then retrograde in standard fashion and confirmed our positioning in both clinically and radiographically and found to be excellent. We then noted at this point that the patient did not require metatarsophalangeal joint release and then we had we had achieved complete resolution of the deformity. We irrigated with copious saline we closed the extensor tendons and the capsule and fascial structures with 3-0 Vicryl. We closed the subcutaneous doctors with 4-0 Vicryl and the skin with 4-0 nylon in a mattress fashion. The K wires were bent and cut. The incisions were dressed with Xeroform and sterile 4 x 4's Vik and a Coban mildly compressive fashion. The patient was awoken and transferred to the stretcher and transported to the recovery area with vital signs stable neurovascular status intact to the operative extremity. After period of postoperative monitoring, she will be discharged home.     Dayne Donald DPM, AACFAS  Foot and Ankle Surgery Fellow  [facility] Revision, [facility] Limb Preservation Surgery of the Foot and Ankle    THIS IS THE FOOT AND ANKLE SURGERY SERVICE

## 2021-12-10 NOTE — ANESTHESIA PREPROCEDURE EVALUATION
Relevant Problems   NEUROLOGY   (+) Depression      ENDOCRINE   (+) Obesity       Anesthetic History   No history of anesthetic complications            Review of Systems / Medical History  Patient summary reviewed, nursing notes reviewed and pertinent labs reviewed    Pulmonary        Sleep apnea: CPAP           Neuro/Psych   Within defined limits           Cardiovascular  Within defined limits                Exercise tolerance: >4 METS     GI/Hepatic/Renal  Within defined limits              Endo/Other        Arthritis     Other Findings              Physical Exam    Airway  Mallampati: II  TM Distance: > 6 cm  Neck ROM: normal range of motion   Mouth opening: Normal     Cardiovascular  Regular rate and rhythm,  S1 and S2 normal,  no murmur, click, rub, or gallop             Dental  No notable dental hx       Pulmonary  Breath sounds clear to auscultation               Abdominal  GI exam deferred       Other Findings            Anesthetic Plan    ASA: 2  Anesthesia type: MAC          Induction: Intravenous  Anesthetic plan and risks discussed with: Patient

## 2021-12-10 NOTE — ANESTHESIA POSTPROCEDURE EVALUATION
Procedure(s):  RIGHT BUNIONECTOMY WITH OSTEOTOMY, RIGHT SECOND AND THIRD HAMMERTOE REPAIR WITH PINNING, RIGHT SECOND AND THIRD METATARSOPHALANGEAL JOINT RELEASE. MAC    Anesthesia Post Evaluation        Patient location during evaluation: PACU  Note status: Adequate. Level of consciousness: responsive to verbal stimuli and sleepy but conscious  Pain management: satisfactory to patient  Airway patency: patent  Anesthetic complications: no  Cardiovascular status: acceptable  Respiratory status: acceptable  Hydration status: acceptable  Comments: +Post-Anesthesia Evaluation and Assessment    Patient: Camila Feliz MRN: [id number]  SSN: [id number]   YOB: 1960  Age: 64 y.o. Sex: female          Cardiovascular Function/Vital Signs    BP (!) 93/56   Pulse 63   Temp 36.7 °C (98.1 °F)   Resp 12   Ht 5' 5.5\" (1.664 m)   Wt 61.2 kg (135 lb)   SpO2 92%   BMI 22.12 kg/m²     Patient is status post Procedure(s):  RIGHT BUNIONECTOMY WITH OSTEOTOMY, RIGHT SECOND AND THIRD HAMMERTOE REPAIR WITH PINNING, RIGHT SECOND AND THIRD METATARSOPHALANGEAL JOINT RELEASE. Nausea/Vomiting: Controlled. Postoperative hydration reviewed and adequate. Pain:  Pain Scale 1: Numeric (0 - 10) (12/10/21 1015)  Pain Intensity 1: 0 (12/10/21 1015)   Managed. Neurological Status:   Neuro (WDL): Within Defined Limits (12/10/21 1015)   At baseline. Mental Status and Level of Consciousness: Arousable. Pulmonary Status:   O2 Device: None (Room air) (12/10/21 1030)   Adequate oxygenation and airway patent. Complications related to anesthesia: None    Post-anesthesia assessment completed. No concerns. I have evaluated the patient and the patient is stable and ready to be discharged from PACU .     Signed By: Sonu Tapia MD    12/10/2021        INITIAL Post-op Vital signs:   Vitals Value Taken Time   BP 93/56 12/10/21 1045   Temp 36.7 °C (98.1 °F) 12/10/21 1015   Pulse 65 12/10/21 1051   Resp 15 12/10/21 1051 SpO2 94 % 12/10/21 1051   Vitals shown include unvalidated device data.

## 2022-01-10 ENCOUNTER — OFFICE VISIT (OUTPATIENT)
Dept: ORTHOPEDIC SURGERY | Age: 62
End: 2022-01-10
Payer: MEDICAID

## 2022-01-10 VITALS — HEIGHT: 66 IN | BODY MASS INDEX: 20.89 KG/M2 | WEIGHT: 130 LBS

## 2022-01-10 DIAGNOSIS — T84.84XA PAIN DUE TO KNEE JOINT PROSTHESIS, INITIAL ENCOUNTER (HCC): ICD-10-CM

## 2022-01-10 DIAGNOSIS — Z96.659 PAIN DUE TO KNEE JOINT PROSTHESIS, INITIAL ENCOUNTER (HCC): ICD-10-CM

## 2022-01-10 DIAGNOSIS — Z96.653 STATUS POST BILATERAL KNEE REPLACEMENTS: Primary | ICD-10-CM

## 2022-01-10 PROCEDURE — 99203 OFFICE O/P NEW LOW 30 MIN: CPT | Performed by: ORTHOPAEDIC SURGERY

## 2022-01-10 RX ORDER — METHYLPHENIDATE HYDROCHLORIDE 5 MG/1
TABLET ORAL 3 TIMES DAILY
COMMUNITY

## 2022-01-10 NOTE — PROGRESS NOTES
Olamide Marlow (: ) is a 64 y.o. female patient, here for evaluation of the following chief complaint(s):  Knee Pain (bilateral knee pain)       ASSESSMENT/PLAN:  Below is the assessment and plan developed based on review of pertinent history, physical exam, labs, studies, and medications. 40-year-old female comes in today for evaluation of bilateral knee pain. She had total knees done more than 10 years ago. X-rays benign. She has intermittent swelling but this last for 1 day and it only happens every couple months. This may be related to synovitis or pinching of the synovium however her exam and x-rays are relatively benign today. I told her if it worsens or if it swells again to let us know and we will consider aspiration. She does not have any fluid on her knee currently      1. Status post bilateral knee replacements  -     XR KNEES BI MIN 4 V; Future  2. Pain due to knee joint prosthesis, initial encounter [facility])      Encounter Diagnoses   Name Primary?  Status post bilateral knee replacements Yes    [facility]         No follow-ups on file. SUBJECTIVE/OBJECTIVE:  Olamide Marlow (: ) is a 64 y.o. female who presents today for the following:  Chief Complaint   Patient presents with    Knee Pain     bilateral knee pain       40-year-old female comes in today for evaluation of bilateral knee pain. She had total knees done more than 10 years ago. She says that every couple months she has a flare where the knee swells for about a day and then gets better. No fevers associated. She was worked up for rheumatoid but this was normal.  Of note her father had ankylosing spondylitis. Currently she is not having any significant pain. IMAGING:  XR Results (most recent):  Results from Hospital Encounter encounter on 12/10/21    [facility] TECHNOLOGIST SERVICE    Narrative  Intraoperative fluoroscopy was provided. No images were saved. Allergies   Allergen Reactions    Sulfa (Sulfonamide Antibiotics) Unknown (comments)     Eyes turned bright red       Current Outpatient Medications   Medication Sig    methylphenidate HCl (RITALIN) 5 mg tablet Take  by mouth.  pyrilamine-dextromethorphan 30-30 mg tab Take  by mouth.  bupropion HCl (WELLBUTRIN PO) Take  by mouth.  LAMOTRIGINE (LAMICTAL PO) Take 300 mg by mouth every morning.  venlafaxine (EFFEXOR) 100 mg tablet Take 150 mg by mouth three (3) times daily. (Patient not taking: Reported on 1/10/2022)     No current facility-administered medications for this visit. Past Medical History:   Diagnosis Date    Arthritis     Nausea & vomiting     RODDY (obstructive sleep apnea)     on CPAP for last 3 y ears    Psychiatric disorder     DEPRESSION    Sleep apnea     uses c pap        Past Surgical History:   Procedure Laterality Date    HX GYN      LT OOPHORECTOMY    HX ORTHOPAEDIC      LLT MENISCUS CONDROPLASTY    HX ORTHOPAEDIC      LT ROTATOR CUFF 2 TIMES RT ROTATOR 1 TIME    HX SHOULDER REPLACEMENT Left     \"reverse procedure\"       Family History   Problem Relation Age of Onset    Psychiatric Disorder Mother     Heart Disease Father         Social History     Tobacco Use    Smoking status: Never Smoker    Smokeless tobacco: Never Used   Substance Use Topics    Alcohol use: No        All systems reviewed x 12 and were negative with the exception of none      No flowsheet data found. Vitals:  Ht 5' 5.5\" (1.664 m)   Wt 130 lb (59 kg)   BMI 21.30 kg/m²    Body mass index is 21.3 kg/m². Physical Exam    Gen: NAD    Resp: Non-labored    RLE: Midline incision is healing well with no evidence of infection. No erythema. Range of motion 0-120°. No extensor lag. Grossly stable in the coronal and sagittal planes. No calf tenderness. No evidence of a DVT. Motor grossly intact with 5 out of 5 strength. Sensation intact to light touch throughout.   Palpable pedal pulses. LLE: Midline incision is healing well with no evidence of infection. No erythema. Range of motion 0-120°. No extensor lag. Grossly stable in the coronal and sagittal planes. No calf tenderness. No evidence of a DVT. Motor grossly intact with 5 out of 5 strength. Sensation intact to light touch throughout. Palpable pedal pulses. An electronic signature was used to authenticate this note.   -- Tramaine Simpson MD

## 2022-02-25 ENCOUNTER — TRANSCRIBE ORDER (OUTPATIENT)
Dept: SCHEDULING | Age: 62
End: 2022-02-25

## 2022-02-25 DIAGNOSIS — Z12.31 VISIT FOR SCREENING MAMMOGRAM: Primary | ICD-10-CM

## 2022-03-08 ENCOUNTER — TRANSCRIBE ORDER (OUTPATIENT)
Dept: REGISTRATION | Age: 62
End: 2022-03-08

## 2022-03-08 ENCOUNTER — HOSPITAL ENCOUNTER (OUTPATIENT)
Dept: PREADMISSION TESTING | Age: 62
Discharge: HOME | End: 2022-03-08
Attending: PODIATRIST
Payer: MEDICAID

## 2022-03-08 DIAGNOSIS — U07.1 COVID-19: Primary | ICD-10-CM

## 2022-03-08 DIAGNOSIS — U07.1 COVID-19: ICD-10-CM

## 2022-03-08 PROCEDURE — U0005 INFEC AGEN DETEC AMPLI PROBE: HCPCS

## 2022-03-09 LAB
SARS-COV-2, XPLCVT: NOT DETECTED
SOURCE, COVRS: NORMAL

## 2022-03-09 NOTE — PERIOP NOTES
[facility] INSTRUCTIONS    Surgery Date:   3/11/22    [facility] staff will call you between 4 PM- 8 PM the day before surgery with your arrival time. If your surgery is on a Monday, we will call you the preceding Friday. Please call [phone] after 8 PM if you did not receive your arrival time. 1. Please report to [facility] Patient Access/Admitting on the 1st floor. Bring your insurance card, photo identification, and any copayment ( if applicable). ALSO BRING YOUR COVID-19 RECORD CARD. 2. If you are going home the same day of your surgery, you must have a responsible adult to drive you home. You need to have a responsible adult to stay with you the first 24 hours after surgery and you should not drive a car for 24 hours following your surgery. 3. Nothing to eat or drink after midnight the night before surgery. This includes no water, gum, mints, coffee, juice, etc.  Please note special instructions, if applicable, below for medications. 4. Do NOT drink alcohol or smoke 24 hours before surgery. STOP smoking for 14 days prior as it helps with breathing and healing after surgery. 5. If you are being admitted to the hospital, please leave personal belongings/luggage in your car until you have an assigned hospital room number. 6. Please wear comfortable clothes. Wear your glasses instead of contacts. We ask that all money, jewelry and valuables be left at home. Wear no make up, particularly mascara, the day of surgery. 7.  All body piercings, rings, and jewelry need to be removed and left at home. Please remove any nail polish or artificial nails from your fingernails. Please wear your hair loose or down. Please no pony-tails, buns, or any metal hair accessories. If you shower the morning of surgery, please do not apply any lotions or powders afterwards. You may wear deodorant, unless having breast surgery. Do not shave any body area within 24 hours of your surgery.   8. Please follow all instructions to avoid any potential surgical cancellation. 9. Should your physical condition change, (i.e. fever, cold, flu, etc.) please notify your surgeon as soon as possible. 10. It is important to be on time. If a situation occurs where you may be delayed, please call:  [phone] / [phone] on the day of surgery. 11. The Preadmission Testing staff can be reached at [phone]. 12. Special instructions: NONE      No current facility-administered medications for this encounter. Current Outpatient Medications   Medication Sig    DEXTROMETHORPHAN 30 mg by Does Not Apply route three (3) times daily.  methylphenidate HCl (RITALIN) 5 mg tablet Take  by mouth three (3) times daily.  bupropion HCl (WELLBUTRIN PO) Take 125 mg by mouth three (3) times daily.  LAMOTRIGINE (LAMICTAL PO) Take 100 mg by mouth two (2) times a day. 1. YOU MUST ONLY TAKE THESE MEDICATIONS THE MORNING OF SURGERY WITH A SIP OF WATER: WELLBUTRIN, LOMACTILMEDICATIONS TO TAKE THE MORNING OF SURGERY ONLY IF NEEDED: TYLENOL  2. HOLD these prescription medications BEFORE Surgery: NONE  3. Ask your surgeon/prescribing physician about when/if to STOP taking these medications: ADVIL NEXT 2 DAYS  4. Stop all vitamins, herbal medicines and Aspirin containing products 7 days prior to surgery. Stop any non-steroidal anti-inflammatory drugs (i.e. Ibuprofen, Naproxen, Advil, Aleve) 3 days before surgery. You may take Tylenol. 5. If you are currently taking Plavix, Coumadin, or any other blood-thinning/anticoagulant medication contact your prescribing physician for instructions. Preventing Infections Before and After  Your Surgery    IMPORTANT INSTRUCTIONS      You play an important role in your health and preparation for surgery. To reduce the germs on your skin you will need to shower with CHG soap (Chorhexidine gluconate 4%) two times before surgery.     CHG soap (Hibiclens, Hex-A-Clens or store brand)   CHG soap will be provided at your Preadmission Testing (PAT) appointment.  If you do not have a PAT appointment before surgery, you may arrange to  CHG soap from our office or purchase CHG soap at a pharmacy, grocery or department store.  You need to purchase TWO 4 ounce bottles to use for your 2 showers. Steps to follow:  1. Wash your hair with your normal shampoo and your body with regular soap and rinse well to remove shampoo and soap from your skin. 2. Wet a clean washcloth and turn off the shower. 3. Put CHG soap on washcloth and apply to your entire body from the neck down. Do not use on your head, face or private parts(genitals). Do not use CHG soap on open sores, wounds or areas of skin irritation. 4. Wash you body gently for 5 minutes. Do not wash your skin too hard. This soap does not create lather. Pay special attention to your underarms and from your belly button to your feet. 5. Turn the shower back on and rinse well to get CHG soap off your body. 6. Pat your skin dry with a clean, dry towel. Do not apply lotions or moisturizer. 7. Put on clean clothes and sleep on fresh bed sheets and do not allow pets to sleep with you. Shower with CHG soap 2 times before your surgery   The evening before your surgery   The morning of your surgery      Tips to help prevent infections after your surgery:  1. Protect your surgical wound from germs:  ? Hand washing is the most important thing you and your caregivers can do to prevent infections. ? Keep your bandage clean and dry! ? Do not touch your surgical wound. 2. Use clean, freshly washed towels and washcloths every time you shower; do not share bath linens with others. 3. Until your surgical wound is healed, wear clothing and sleep on bed linens each day that are clean and freshly washed. 4. Do not allow pets to sleep in your bed with you or touch your surgical wound. 5. Do not smoke  smoking delays wound healing.  This may be a good time to stop smoking. 6. If you have diabetes, it is important for you to manage your blood sugar levels properly before your surgery as well as after your surgery. Poorly managed blood sugar levels slow down wound healing and prevent you from healing completely. [facility]   Instructions for Pre-Surgery COVID-19 Testing     Across our ministry we have established standard guidelines to ensure the health and safety of our patients, residents and associates as we resume elective services for patients. All patients presenting for surgery are required to have a COVID-19 test result within 96 hours of their scheduled surgery. [facility] is providing this test free of charge to the patient. Instructions for COVID-19 Testing:     Patients will receive a call from Pre-Admission Testing 4-5 days prior to surgery to schedule a date and time to come to the [address] [place] [address] Drive for their COVID-19 test   Patients are advised to self-quarantine after testing until their scheduled surgery   Once on site, patients will be registered and receive COVID test in their vehicle   If a patient is scheduled for normal Pre Admission Testing 96 hours from date of surgery, the patient will still have their COVID test done at the [address] [place] [address] located at [address] [place] [address] Positive results will be shared with the surgeon and anesthesiologist and may result in cancellation of the elective procedure    Testing Hours and Location:   Address:  [address] [place] [address] Up Pre Admission [facility] in the Discharge Lot on [facility] (Map Attached)  Kaitlin Jonglacie, [address]   Hours: Monday- Friday 7a-3p    PAT Phone Number: [phone]            Patient Information Regarding COVID Restrictions    Patients are advised to self-quarantine after COVID testing up to the day of the scheduled procedure.     Day of Procedure     Please park in the parking deck or any designated visitor parking lot.  Enter the facility through the Main Entrance of the hospital.   A temperature check and appropriate symptom/exposure screening will be done prior to entry to the facility.  On the day of surgery, please provide the cell phone number of the person who will be waiting for you to the Patient Access representative at the time of registration.  Please wear a mask on the day of your procedure.  We are now allowing one designated visitor per stay. Pediatric patients may have 2 designated visitors. This one person may come in with you on the day of your procedure.  No visitors under the age of 13.  The designated visitor must also wear a mask.  Once your procedure and the immediate recovery period is completed, a nurse in the recovery area will contact your designated visitor to inform them of your room number or to otherwise review other pertinent information regarding your care.  Social distancing practices are to be adhered to in waiting areas and the cafeteria. The patient was contacted  via phone. She verbalized understanding of all instructions does not  need reinforcement.

## 2022-03-09 NOTE — PERIOP NOTES
PT WAS GIVEN PREOP INSTRUCTIOS VIA PHONE. INSTRUCTED ABT CHG SOAP, WITH OPTION TO  OR TO BUY THE SOAP. MAY ALSO USE ANTIBACTERIAL SOAP IF UNABLE TO PROCURE ANY CHG SOAP. PT WAS GIVEN AN OPPORTUNITY TO ASK QUESTIONS. 1620: CALLED DR. KU'S OFFICE FOR ORDERS FOR DOS. SPOKE WITH RILEY WHO FORWARDED ME TO ASSISTANT. LEFT  FOR ORDERS. FAX NUMBER PROVIDED.

## 2022-03-10 ENCOUNTER — ANESTHESIA EVENT (OUTPATIENT)
Dept: SURGERY | Age: 62
End: 2022-03-10
Payer: MEDICAID

## 2022-03-11 ENCOUNTER — HOSPITAL ENCOUNTER (OUTPATIENT)
Age: 62
Setting detail: OUTPATIENT SURGERY
Discharge: HOME | End: 2022-03-11
Attending: PODIATRIST | Admitting: PODIATRIST
Payer: MEDICAID

## 2022-03-11 ENCOUNTER — ANESTHESIA (OUTPATIENT)
Dept: SURGERY | Age: 62
End: 2022-03-11
Payer: MEDICAID

## 2022-03-11 VITALS
DIASTOLIC BLOOD PRESSURE: 49 MMHG | HEIGHT: 66 IN | SYSTOLIC BLOOD PRESSURE: 92 MMHG | BODY MASS INDEX: 21.69 KG/M2 | WEIGHT: 135 LBS | TEMPERATURE: 98.1 F | OXYGEN SATURATION: 93 % | RESPIRATION RATE: 15 BRPM | HEART RATE: 65 BPM

## 2022-03-11 PROCEDURE — 74011000272 HC RX REV CODE- 272: Performed by: PODIATRIST

## 2022-03-11 PROCEDURE — 77030020743 HC CAP PROTCT PIN BATR -A: Performed by: PODIATRIST

## 2022-03-11 PROCEDURE — 74011000250 HC RX REV CODE- 250: Performed by: PODIATRIST

## 2022-03-11 PROCEDURE — 74011250636 HC RX REV CODE- 250/636: Performed by: PODIATRIST

## 2022-03-11 PROCEDURE — C1769 GUIDE WIRE: HCPCS | Performed by: PODIATRIST

## 2022-03-11 PROCEDURE — 74011250636 HC RX REV CODE- 250/636: Performed by: ANESTHESIOLOGY

## 2022-03-11 PROCEDURE — 77030006773 HC BLD SAW OSC BRSM -A: Performed by: PODIATRIST

## 2022-03-11 PROCEDURE — C1713 ANCHOR/SCREW BN/BN,TIS/BN: HCPCS | Performed by: PODIATRIST

## 2022-03-11 PROCEDURE — 77030002933 HC SUT MCRYL J&J -A: Performed by: PODIATRIST

## 2022-03-11 PROCEDURE — 77030013175 HC SHOE PSTOP DJOR -A

## 2022-03-11 PROCEDURE — C9290 INJ, BUPIVACAINE LIPOSOME: HCPCS | Performed by: PODIATRIST

## 2022-03-11 PROCEDURE — 77030039825 HC MSK NSL PAP SUPERNO2VA VYRM -B: Performed by: NURSE ANESTHETIST, CERTIFIED REGISTERED

## 2022-03-11 PROCEDURE — 74011250636 HC RX REV CODE- 250/636: Performed by: NURSE ANESTHETIST, CERTIFIED REGISTERED

## 2022-03-11 PROCEDURE — 77030034564: Performed by: PODIATRIST

## 2022-03-11 PROCEDURE — 74011000250 HC RX REV CODE- 250: Performed by: NURSE ANESTHETIST, CERTIFIED REGISTERED

## 2022-03-11 PROCEDURE — 76210000020 HC REC RM PH II FIRST 0.5 HR: Performed by: PODIATRIST

## 2022-03-11 PROCEDURE — 77030031139 HC SUT VCRL2 J&J -A: Performed by: PODIATRIST

## 2022-03-11 PROCEDURE — 77030020268 HC MISC GENERAL SUPPLY: Performed by: PODIATRIST

## 2022-03-11 PROCEDURE — 77030020754 HC CUF TRNQT 2BLA STRY -B: Performed by: PODIATRIST

## 2022-03-11 PROCEDURE — 2709999900 HC NON-CHARGEABLE SUPPLY: Performed by: PODIATRIST

## 2022-03-11 PROCEDURE — 76060000035 HC ANESTHESIA 2 TO 2.5 HR: Performed by: PODIATRIST

## 2022-03-11 PROCEDURE — 76010000131 HC OR TIME 2 TO 2.5 HR: Performed by: PODIATRIST

## 2022-03-11 PROCEDURE — 74011000258 HC RX REV CODE- 258: Performed by: NURSE ANESTHETIST, CERTIFIED REGISTERED

## 2022-03-11 PROCEDURE — 77030040922 HC BLNKT HYPOTHRM STRY -A

## 2022-03-11 PROCEDURE — 76210000000 HC OR PH I REC 2 TO 2.5 HR: Performed by: PODIATRIST

## 2022-03-11 PROCEDURE — 77030003775: Performed by: PODIATRIST

## 2022-03-11 DEVICE — SCREW BNE L14MM DIA2.5MM MIC FT ANK TI SELF DRL ST CANN: Type: IMPLANTABLE DEVICE | Site: FOOT | Status: FUNCTIONAL

## 2022-03-11 RX ORDER — SODIUM CHLORIDE, SODIUM LACTATE, POTASSIUM CHLORIDE, CALCIUM CHLORIDE 600; 310; 30; 20 MG/100ML; MG/100ML; MG/100ML; MG/100ML
25 INJECTION, SOLUTION INTRAVENOUS CONTINUOUS
Status: DISCONTINUED | OUTPATIENT
Start: 2022-03-11 | End: 2022-03-11 | Stop reason: HOSPADM

## 2022-03-11 RX ORDER — LIDOCAINE HYDROCHLORIDE 10 MG/ML
INJECTION INFILTRATION; PERINEURAL AS NEEDED
Status: DISCONTINUED | OUTPATIENT
Start: 2022-03-11 | End: 2022-03-11 | Stop reason: HOSPADM

## 2022-03-11 RX ORDER — SODIUM CHLORIDE 9 MG/ML
INJECTION, SOLUTION INTRAVENOUS
Status: DISCONTINUED | OUTPATIENT
Start: 2022-03-11 | End: 2022-03-11 | Stop reason: HOSPADM

## 2022-03-11 RX ORDER — PROPOFOL 10 MG/ML
INJECTION, EMULSION INTRAVENOUS AS NEEDED
Status: DISCONTINUED | OUTPATIENT
Start: 2022-03-11 | End: 2022-03-11 | Stop reason: HOSPADM

## 2022-03-11 RX ORDER — MIDAZOLAM HYDROCHLORIDE 1 MG/ML
INJECTION, SOLUTION INTRAMUSCULAR; INTRAVENOUS AS NEEDED
Status: DISCONTINUED | OUTPATIENT
Start: 2022-03-11 | End: 2022-03-11 | Stop reason: HOSPADM

## 2022-03-11 RX ORDER — LIDOCAINE HYDROCHLORIDE 20 MG/ML
INJECTION, SOLUTION EPIDURAL; INFILTRATION; INTRACAUDAL; PERINEURAL AS NEEDED
Status: DISCONTINUED | OUTPATIENT
Start: 2022-03-11 | End: 2022-03-11 | Stop reason: HOSPADM

## 2022-03-11 RX ADMIN — SODIUM CHLORIDE, POTASSIUM CHLORIDE, SODIUM LACTATE AND CALCIUM CHLORIDE 25 ML/HR: 600; 310; 30; 20 INJECTION, SOLUTION INTRAVENOUS at 06:53

## 2022-03-11 RX ADMIN — LIDOCAINE HYDROCHLORIDE 50 MG: 20 INJECTION, SOLUTION EPIDURAL; INFILTRATION; INTRACAUDAL; PERINEURAL at 07:35

## 2022-03-11 RX ADMIN — DEXMEDETOMIDINE HYDROCHLORIDE 4 MCG: 100 INJECTION, SOLUTION, CONCENTRATE INTRAVENOUS at 07:38

## 2022-03-11 RX ADMIN — PROPOFOL 50 MG: 10 INJECTION, EMULSION INTRAVENOUS at 07:35

## 2022-03-11 RX ADMIN — MIDAZOLAM 2 MG: 1 INJECTION INTRAMUSCULAR; INTRAVENOUS at 07:27

## 2022-03-11 RX ADMIN — PROPOFOL 40 MG: 10 INJECTION, EMULSION INTRAVENOUS at 07:53

## 2022-03-11 RX ADMIN — SODIUM CHLORIDE: 900 INJECTION, SOLUTION INTRAVENOUS at 09:52

## 2022-03-11 RX ADMIN — WATER 2 G: 1 INJECTION INTRAMUSCULAR; INTRAVENOUS; SUBCUTANEOUS at 07:42

## 2022-03-11 RX ADMIN — DEXMEDETOMIDINE HYDROCHLORIDE 4 MCG: 100 INJECTION, SOLUTION, CONCENTRATE INTRAVENOUS at 07:46

## 2022-03-11 RX ADMIN — PROPOFOL 50 MCG/KG/MIN: 10 INJECTION, EMULSION INTRAVENOUS at 07:35

## 2022-03-11 NOTE — DISCHARGE INSTRUCTIONS
Another post op instructions sheet has been provided by Kimber[facility]. Additional information is provided below. Bunion Removal: What to Expect at [address] had bunion surgery to remove a lump of bone (bunion) from the joint where your big toe joins your foot, and to straighten your big toe. You will have pain and swelling that slowly improves in the 6 weeks after surgery. You may have some minor pain and swelling that lasts as long as 6 months to a year. After surgery, you will need to wear a cast or a special type of shoe to protect your toe and to keep it in the right position for at least 3 to 6 weeks. After some types of surgeries, a cast or special shoe is used for a few months. Your doctor will remove your stitches or sutures about 2 weeks after the surgery. If you have removable pins holding your toe in place, they are usually removed in about 4 to 6 weeks. This care sheet gives you a general idea about how long it will take for you to recover. But each person recovers at a different pace. Follow the steps below to get better as quickly as possible. How can you care for yourself at home? Activity    · Rest when you feel tired. Getting enough sleep will help you recover.     · Ask your doctor when you can drive again.     · You may shower, unless your doctor tells you not to. Keep the bandage dry. If the bandage has been removed, you can wash the area with warm water and soap. Pat the area dry.     · You will probably need to take several weeks off from work. How much time you need to take off depends on the type of work you do and the extent of your surgery.     · You may need to avoid heavy lifting for 3 to 8 weeks or longer, depending on the type of surgery you had.     · You may need to do regular rehabilitation (rehab) exercises to strengthen your foot and improve movement. Start out slowly, and follow your doctor's instructions.    Diet    · You can eat your normal diet. If your stomach is upset, try bland, low-fat foods like plain rice, broiled chicken, toast, and yogurt.     · You may notice that your bowel movements are not regular right after your surgery. This is common. Try to avoid constipation and straining with bowel movements. You may want to take a fiber supplement every day. If you have not had a bowel movement after a couple of days, ask your doctor about taking a mild laxative. Medicines    · Your doctor will tell you if and when you can restart your medicines. He or she will also give you instructions about taking any new medicines.     · If you take aspirin or some other blood thinner, ask your doctor if and when to start taking it again. Make sure that you understand exactly what your doctor wants you to do.     · Be safe with medicines. Take pain medicines exactly as directed. ? If the doctor gave you a prescription medicine for pain, take it as prescribed. ? If you are not taking a prescription pain medicine, ask your doctor if you can take an over-the-counter medicine.     · If your doctor prescribed antibiotics, take them as directed. Do not stop taking them just because you feel better. You need to take the full course of antibiotics.     · If you think your pain medicine is making you sick to your stomach:  ? Take your medicine after meals (unless your doctor has told you not to). ? Ask your doctor for a different pain medicine. Incision care    · You will leave the hospital with bandages holding your toe in the correct position. Your doctor will probably remove the bandages after several days. Or your doctor may have you remove your bandages at home. Do not touch the surgery area. Keep it dry.     · Do not soak your foot until your doctor says it is okay. Ice and elevation    · For pain and swelling, put ice or a cold pack on your foot for 10 to 20 minutes each hour.  Put a thin cloth between the ice and your skin.     · Prop up your foot and leg on a pillow when you ice it or anytime you sit or lie down during the next 3 days. Try to keep it above the level of your heart. This will help reduce swelling. Follow-up care is a key part of your treatment and safety. Be sure to make and go to all appointments, and call your doctor if you are having problems. It's also a good idea to know your test results and keep a list of the medicines you take. When should you call for help? Call 911 anytime you think you may need emergency care. For example, call if:    · You passed out (lost consciousness).     · You have sudden chest pain and shortness of breath, or you cough up blood.     · You have severe trouble breathing. Call your doctor now or seek immediate medical care if:    · Your foot or toe is cool or pale or changes color.     · You have numbness, tingling, or less feeling in your foot or toes.     · You have pain that does not get better after you take pain medicine.     · You have loose stitches, or your incision comes open.     · Bright red blood has soaked through the bandage over your incision.     · You have signs of infection, such as:  ? Increased pain, swelling, warmth, or redness. ? Red streaks leading from the incision. ? Pus draining from the incision. ? Swollen lymph nodes in your neck, armpits, or groin. ? A fever. Watch closely for any changes in your health, and be sure to contact your doctor if:    · You do not have a bowel movement after taking a laxative. Where can you learn more? Go to http://www.gray.com/  Enter V543 in the search box to learn more about \"Bunion Removal: What to Expect at Home. \"  Current as of: July 1, 2021               Content Version: 13.2  © 2331-5830 [employer]. Care instructions adapted under license by [employer] (which disclaims liability or warranty for this information).  If you have questions about a medical condition or this instruction, always ask your healthcare professional. [place] [address] any warranty or liability for your use of this information. Surgery for Hammer Toe: What to Expect at [address] had hammer toe surgery to straighten a curled toe. After your surgery, your toe may be stiff, red, and swollen. Depending on the type of surgery you had for your hammer toe, these symptoms can last for weeks to months. They will slowly get better with time. After surgery, you will need to wear a special type of shoe to protect your toe and to keep it in the right position for 3 to 6 weeks. Your doctor will remove your stitches or sutures about 2 weeks after the surgery. If your doctor put a temporary pin or other device in place to keep your toe straight while it heals, it will be removed 3 to 6 weeks after surgery. This care sheet gives you a general idea about how long it will take for you to recover. But each person recovers at a different pace. Follow the steps below to get better as quickly as possible. How can you care for yourself at home? Activity    · Rest when you feel tired. Getting enough sleep will help you recover.     · Try to walk each day if you are able. Start by walking a little more than you did the day before. Bit by bit, increase the amount you walk. Walking boosts blood flow and helps prevent pneumonia and constipation.     · It may be as long as 4 to 6 weeks before you can drive. Ask your doctor when you can drive again.     · You may shower, unless your doctor tells you not to. Keep the bandage dry. If the bandage has been removed, you can wash the area with plain warm water. Pat the area dry.     · You will probably need to take at least 1 to 4 weeks off work, depending on your job. It will be 3 to 6 weeks or longer before you can stand or walk for long periods. Diet    · You can eat your normal diet.  If your stomach is upset, try bland, low-fat foods like plain rice, broiled chicken, toast, and yogurt.     · You may notice that your bowel movements are not regular right after your surgery. This is common. Try to avoid constipation and straining with bowel movements. You may want to take a fiber supplement every day. If you have not had a bowel movement after a couple of days, ask your doctor about taking a mild laxative. Medicines    · Your doctor will tell you if and when you can restart your medicines. He or she will also give you instructions about taking any new medicines.     · If you take aspirin or some other blood thinner, ask your doctor if and when to start taking it again. Make sure that you understand exactly what your doctor wants you to do.     · Be safe with medicines. Take pain medicines exactly as directed. ? If the doctor gave you a prescription medicine for pain, take it as prescribed. ? If you are not taking a prescription pain medicine, ask your doctor if you can take an over-the-counter medicine.     · If your doctor prescribed antibiotics, take them as directed. Do not stop taking them just because you feel better. You need to take the full course of antibiotics.     · If you think your pain medicine is making you sick to your stomach:  ? Take your medicine after meals (unless your doctor has told you not to). ? Ask your doctor for a different pain medicine. Incision care    · You will leave the hospital with bandages holding your toe in the correct position. Your doctor will probably remove the bandages after several days. Or your doctor may have you remove your bandages at home. Do not touch the surgery area. Keep it dry.     · Do not soak your foot until your doctor says it is okay. Ice and elevation    · For pain and swelling, put ice or a cold pack on your foot for 10 to 20 minutes each hour.  Put a thin cloth between the ice and your skin.     · Prop up your foot and leg on a pillow when you ice it or anytime you sit or lie down during the next 3 days. Try to keep it above the level of your heart. This will help reduce swelling. Follow-up care is a key part of your treatment and safety. Be sure to make and go to all appointments, and call your doctor if you are having problems. It's also a good idea to know your test results and keep a list of the medicines you take. When should you call for help? Call 911 anytime you think you may need emergency care. For example, call if:    · You passed out (lost consciousness).     · You have sudden chest pain and shortness of breath, or you cough up blood.     · You have severe trouble breathing. Call your doctor now or seek immediate medical care if:    · Your foot or toes are cool or pale or change color.     · You have numbness, tingling, or less feeling in your foot or toe.     · You have pain that does not get better after you take pain medicine.     · You have loose stitches, or your incision comes open.     · Bright red blood has soaked through the bandage over your incision.     · You have signs of infection, such as:  ? Increased pain, swelling, warmth, or redness. ? Red streaks leading from the incision. ? Pus draining from the incision. ? Swollen lymph nodes in your neck, armpits, or groin. ? A fever. Watch closely for any changes in your health, and be sure to contact your doctor if:    · You do not have a bowel movement after taking a laxative. Where can you learn more? Go to http://www.gray.com/  Enter A439 in the search box to learn more about \"Surgery for Hammer Toe: What to Expect at Home. \"  Current as of: July 1, 2021               Content Version: 13.2  © 8097-7602 [employer]. Care instructions adapted under license by [employer] (which disclaims liability or warranty for this information).  If you have questions about a medical condition or this instruction, always ask your healthcare professional. [address]  any warranty or liability for your use of this information. ______________________________________________________________________    Anesthesia Discharge Instructions    After general anesthesia or intervenous sedation, for 24 hours or while taking prescription Narcotics:  · Limit your activities  · Do not drive or operate hazardous machinery  · If you have not urinated within 8 hours after discharge, please contact your surgeon on call. · Do not make important personal or business decisions  · Do not drink alcoholic beverages    Report the following to your surgeon:  · Excessive pain, swelling, redness or odor of or around the surgical area  · Temperature over 100.5 degrees  · Nausea and vomiting lasting longer than 4 hours or if unable to take medication  · Any signs of decreased circulation or nerve impairment to extremity:  Change in color, persistent numbness, tingling, coldness or increased pain.   · Any questions

## 2022-03-11 NOTE — BRIEF OP NOTE
Brief Postoperative Note    Patient: Jackie Haney  YOB: 1960  MRN: [id number]    Date of Procedure: 3/11/2022     Pre-Op Diagnosis:     1. HALLUS VALGUS LEFT  2. Left 2nd hammertoe  3. Left 3rd hammertoe    Post-Op Diagnosis: Same as preoperative diagnosis. Procedure(s):  1. LEFT chevron BUNIONECTOMY   2. Hammertoe repair with PIPJ arthrodesis, left 2nd toe  3. Hammertoe repair with PIPJ arthrodesis, left 3rd toe    Surgeon: Dr. Becerra Estimable    Surgical Assistant: Alejandra Watson DPM, [facility]  Anesthesia: MAC with 20 cc of 1% lidocaine plain    Estimated Blood Loss (mL): minimal    Complications: None    Specimens: * No specimens in log *     Implants:   Implant Name Type Inv.  Item Serial No.  Lot No. LRB No. Used Action   SCR BNE CANC PT 3X18MM TI -- QUICKFIX - SNA  SCR BNE CANC PT 3X18MM TI -- QUICKFIX NA [employer] NA Left 2 Implanted       Drains: none    Findings: as expected    Electronically Signed by Keturah Liu MD on 3/11/2022 at 9:58 AM

## 2022-03-11 NOTE — PERIOP NOTES
Patient discharged with [facility]. She will have a friend at her house waiting for her and will be there at least 24 hours. Reviewed discharge instructions with patient and provided printed instructions.

## 2022-03-11 NOTE — ANESTHESIA PREPROCEDURE EVALUATION
Relevant Problems   NEUROLOGY   (+) Depression      ENDOCRINE   (+) Obesity       Anesthetic History   No history of anesthetic complications  PONV          Review of Systems / Medical History  Patient summary reviewed, nursing notes reviewed and pertinent labs reviewed    Pulmonary  Within defined limits      Sleep apnea: CPAP           Neuro/Psych   Within defined limits      Psychiatric history     Cardiovascular  Within defined limits                Exercise tolerance: >4 METS     GI/Hepatic/Renal  Within defined limits              Endo/Other  Within defined limits      Morbid obesity and arthritis     Other Findings              Physical Exam    Airway  Mallampati: II  TM Distance: > 6 cm  Neck ROM: normal range of motion   Mouth opening: Normal     Cardiovascular  Regular rate and rhythm,  S1 and S2 normal,  no murmur, click, rub, or gallop             Dental  No notable dental hx       Pulmonary  Breath sounds clear to auscultation               Abdominal  GI exam deferred       Other Findings            Anesthetic Plan    ASA: 2  Anesthesia type: MAC          Induction: Intravenous  Anesthetic plan and risks discussed with: Patient

## 2022-03-11 NOTE — ANESTHESIA POSTPROCEDURE EVALUATION
Post-Anesthesia Evaluation and Assessment    Patient: Tanya Taylor MRN: [id number]  SSN: [id number]    YOB: 1960  Age: 64 y.o. Sex: female      I have evaluated the patient and they are stable and ready for discharge from the PACU. Cardiovascular Function/Vital Signs  Visit Vitals  BP (!) 105/51 (BP 1 Location: Right upper arm, BP Patient Position: At rest)   Pulse 62   Temp 36.7 °C (98.1 °F)   Resp 16   Ht 5' 5.5\" (1.664 m)   Wt 61.2 kg (135 lb)   SpO2 96%   BMI 22.12 kg/m²       Patient is status post MAC anesthesia for Procedure(s):  LEFT BUNIONECTOMY WITH OSTEOTOMY AND 2ND AND 3RD HAMMERTOE REPAIR. Nausea/Vomiting: None    Postoperative hydration reviewed and adequate. Pain:  Pain Scale 1: Numeric (0 - 10) (03/11/22 1036)  Pain Intensity 1: 0 (03/11/22 1036)   Managed    Neurological Status:   Neuro (WDL): Within Defined Limits (03/11/22 1036)  Neuro  LUE Motor Response: Purposeful (03/11/22 1036)  LLE Motor Response: Purposeful (03/11/22 1036)  RUE Motor Response: Purposeful (03/11/22 1036)  RLE Motor Response: Purposeful (03/11/22 1036)   At baseline    Mental Status, Level of Consciousness: Alert and  oriented to person, place, and time    Pulmonary Status:   O2 Device: None (Room air) (03/11/22 1036)   Adequate oxygenation and airway patent    Complications related to anesthesia: None    Post-anesthesia assessment completed. No concerns    Signed By: Khoa Hooper MD     March 11, 2022              Procedure(s):  LEFT BUNIONECTOMY WITH OSTEOTOMY AND 2ND AND 3RD HAMMERTOE REPAIR. MAC    <BSHSIANPOST>    INITIAL Post-op Vital signs:   Vitals Value Taken Time   /51 03/11/22 1036   Temp 36.7 °C (98.1 °F) 03/11/22 1036   Pulse 65 03/11/22 1044   Resp 15 03/11/22 1044   SpO2 95 % 03/11/22 1044   Vitals shown include unvalidated device data.

## 2022-03-11 NOTE — OP NOTES
Date of Procedure: 3/11/2022      Pre-Op Diagnosis:      1. HALLUS VALGUS LEFT  2. Left 2nd hammertoe  3. Left 3rd hammertoe     Post-Op Diagnosis: Same as preoperative diagnosis.       Procedure(s):  1. LEFT chevron BUNIONECTOMY   2. Hammertoe repair with PIPJ arthrodesis, left 2nd toe  3. Hammertoe repair with PIPJ arthrodesis, left 3rd toe        Surgeon: Dr. Rohan Smith    Surgical Assistant: Jojo Montoya DPM, [facility]  Anesthesia: MAC with 20 cc of 1% lidocaine plain      Estimated Blood Loss (mL): minimal     Complications: None     Specimens: * No specimens in log *      Implants:   Implant Name Type Inv. Item Serial No.  Lot No. LRB No. Used Action   SCR BNE CANC PT 3X18MM TI -- QUICKFIX - SNA   SCR BNE CANC PT 3X18MM TI -- QUICKFIX NA [employer] NA Left 2 Implanted       0.045 Kwires x4  Drains: none     Findings: as expected    Indications for surgery:   Angella Arellano is [id number] female is been suffering from a painful left left bunion as well as painful hammertoes of the second and third toes on the left foot for at least 5 to 6 months now. All conservative treatments have been tried and failed. Was determined she would benefit from surgical intervention. She was consented in the preoperative area. All questions were answered no guarantees were given. PROCEDURE:  On 3/11/2022, the patient was taken to the operating room at [facility] and placed on the operating table in supine position. After adequate induction of sedation, the patient was blocked with 1% lidocaine plain. An ankle tourniquet was applied and padded copiously. Left foot Chevron bunionectomy     Incision was made along the dorsal aspect of the first metatarsophalangeal joint and carried down to the level of the superficial fascia.    .  Dissection was carried down to the interspace where the adductor tendon was freed from the lateral aspect of the base of the proximal phalanx and fibular sesamoid. A medial capsulotomy was performed inferior to the extensor hallucis capsularis and the medial eminence was exposed. We cut the medial eminence off in an oblique fashion preserving the tibial sesamoidal groove. We then placed a 0.062 K-wire and cut a Chevron osteotomy in line with the access guide. We shifted it lateral and stabilized with 0.045 K-wires. Three x 3.0mm  screws were placed across the capital fragment into the shaft holding it securely. The overhanging shaft was cut in line with the capital fragment and smoothed out with an oval neema. Left 2nd toe PIPJ arthrodesis   I then made a linear incision over the proximal interphalangeal joint of the operative toe I then made a transverse cut on the  extensor tendon exposing the head of the proximal phalanx as well as the base of the middle phalanx, which were resected with a sagittal saw. I then placed two 0.045 K-wires from the middle phalanx base out the end of the toe and then retrograded back to the proximal phalanx again holding in corrected position. The extensor tendon of the third toe was reapproximated with a 3-0 Vicryl. Left 3rd toe PIPJ arthrodesis   I then made a linear incision over the proximal interphalangeal joint of the operative toe I then made a transverse cut on the  extensor tendon exposing the head of the proximal phalanx as well as the base of the middle phalanx, which were resected with a sagittal saw. I then placed two 0.045 K-wires from the middle phalanx base out the end of the toe and then retrograded back to the proximal phalanx again holding in corrected position. The extensor tendon of the third toe was reapproximated with a 3-0 Vicryl. The area was flushed out. The deep structures were closed with 3-0 Vicryl, superficial fascia with 4-0 Vicryl, and skin was reapproximated with 4-0 nylon in mattress fashion. The site was dressed with xeroform, dry sterile 4x4 gauze, Kerlix and Coban.  The patient left the operating room to the recovery room in stable condition with neurovascular status intact to the operative extremity.      Phi Henry DPM, [facility]  Foot and Ankle Surgery Fellow  [facility] [facility] Reconstructive, Revision, and Limb Preservation Surgery of the Foot and Ankle

## 2022-03-23 ENCOUNTER — HOSPITAL ENCOUNTER (OUTPATIENT)
Dept: MAMMOGRAPHY | Age: 62
Discharge: HOME | End: 2022-03-23
Attending: FAMILY MEDICINE
Payer: MEDICAID

## 2022-03-23 DIAGNOSIS — Z12.31 VISIT FOR SCREENING MAMMOGRAM: ICD-10-CM

## 2022-03-23 PROCEDURE — 77063 BREAST TOMOSYNTHESIS BI: CPT

## 2023-02-24 ENCOUNTER — TRANSCRIBE ORDER (OUTPATIENT)
Dept: SCHEDULING | Age: 63
End: 2023-02-24

## 2023-02-24 DIAGNOSIS — Z12.31 SCREENING MAMMOGRAM FOR HIGH-RISK PATIENT: Primary | ICD-10-CM

## 2023-04-22 DIAGNOSIS — Z12.31 SCREENING MAMMOGRAM FOR HIGH-RISK PATIENT: Primary | ICD-10-CM

## 2023-04-28 ENCOUNTER — APPOINTMENT (OUTPATIENT)
Dept: CT IMAGING | Age: 63
End: 2023-04-28
Attending: EMERGENCY MEDICINE
Payer: MEDICAID

## 2023-04-28 ENCOUNTER — HOSPITAL ENCOUNTER (EMERGENCY)
Age: 63
Discharge: HOME | End: 2023-04-28
Attending: EMERGENCY MEDICINE
Payer: MEDICAID

## 2023-04-28 VITALS
DIASTOLIC BLOOD PRESSURE: 60 MMHG | OXYGEN SATURATION: 97 % | TEMPERATURE: 97 F | HEART RATE: 63 BPM | SYSTOLIC BLOOD PRESSURE: 96 MMHG | RESPIRATION RATE: 16 BRPM

## 2023-04-28 DIAGNOSIS — S00.03XA CONTUSION OF SCALP, INITIAL ENCOUNTER: ICD-10-CM

## 2023-04-28 DIAGNOSIS — S09.90XA CLOSED HEAD INJURY, INITIAL ENCOUNTER: Primary | ICD-10-CM

## 2023-04-28 PROCEDURE — 99284 EMERGENCY DEPT VISIT MOD MDM: CPT

## 2023-04-28 PROCEDURE — 70450 CT HEAD/BRAIN W/O DYE: CPT

## 2023-04-28 RX ORDER — ONDANSETRON 4 MG/1
4 TABLET, FILM COATED ORAL
Qty: 20 TABLET | Refills: 0 | Status: SHIPPED | OUTPATIENT
Start: 2023-04-28

## 2023-04-28 RX ORDER — METHOCARBAMOL 750 MG/1
750 TABLET, FILM COATED ORAL
Qty: 15 TABLET | Refills: 0 | Status: SHIPPED | OUTPATIENT
Start: 2023-04-28

## 2023-04-28 NOTE — DISCHARGE INSTRUCTIONS
As discussed, your head CT is unremarkable for any emergent issue. Follow up with your PCP for further management. Return to the ER if you experience severe or worsening symptoms. You may take Ibuprofen 800mg every 6-8 hours as needed for pain. You may also alternate with Tylenol 1000mg every 6-8 hours as needed for pain. You are being prescribed Zofran for nausea and Robaxin as a muscle relaxer.

## 2023-04-28 NOTE — ED TRIAGE NOTES
Arrives ambulatory from [facility] for slip and fall while at work this morning ~ 0830, striking her head. +photosensitivity. +nausea.      Per [facility], pt has concussion and a nystagmus

## 2023-04-28 NOTE — ED PROVIDER NOTES
Fall  Associated symptoms include nausea and headaches. Pertinent negatives include no fever, no numbness, no abdominal pain and no vomiting. Sarah Ramos is a 58 y.o. female with Hx of arthritis, RODDY, depression who presents ambulatory to [facility] ED with cc of head injury. Patient had a ground-level mechanical slip and fall around 830 this morning and struck her head. She reports headache, dizziness, nausea, photosensitivity, \"delayed processing\", pain in the muscles of her upper back bilaterally. Denies LOC, use of blood thinners, visual disturbance, pain elsewhere, any other concerns or injuries at this time. Patient initially seen at patient first diagnosed with concussion and nystagmus. Patient first referred here for imaging. PCP: Adriel Salinas MD    There are no other complaints, changes or physical findings at this time. Past Medical History:   Diagnosis Date    Arthritis     OSTEO    Nausea & vomiting     RODDY (obstructive sleep apnea)     on CPAP for last 3 y ears    Psychiatric disorder     DEPRESSION    Sleep apnea     uses c pap       Past Surgical History:   Procedure Laterality Date    HX GI      COLONOSCOPY MULTIPLE WITH REMOVAL OF BENIGN POLYPS    HX GYN      LT OOPHORECTOMY    HX OOPHORECTOMY      Left ovary removed.     HX ORTHOPAEDIC      LLT MENISCUS CONDROPLASTY    HX ORTHOPAEDIC      LT ROTATOR CUFF 2 TIMES RT ROTATOR 1 TIME    HX ORTHOPAEDIC      BILATERAL KNEE REPLACEMENTS    HX SHOULDER REPLACEMENT Left     \"reverse procedure\"    SD ABDOMEN SURGERY PROC UNLISTED  2018    ABDOMINAL - DUODENAL SWITCH, WITH CHOLECYSTECTOMY          Family History:   Problem Relation Age of Onset    Psychiatric Disorder Mother     Heart Disease Father        Social History     Socioeconomic History    Marital status: SINGLE     Spouse name: Not on file    Number of children: Not on file    Years of education: Not on file    Highest education level: Not on file   Occupational History    Not on file   Tobacco Use    Smoking status: Never    Smokeless tobacco: Never   Substance and Sexual Activity    Alcohol use: No    Drug use: Not Currently    Sexual activity: Not on file   Other Topics Concern    Not on file   Social History Narrative    Not on file     Social Determinants of Health     Financial Resource Strain: Not on file   Food Insecurity: Not on file   Transportation Needs: Not on file   Physical Activity: Not on file   Stress: Not on file   Social Connections: Not on file   Intimate Partner Violence: Not on file   Housing Stability: Not on file         ALLERGIES: Sulfa (sulfonamide antibiotics)    Review of Systems   Constitutional:  Negative for activity change, appetite change, chills and fever. HENT:  Negative for congestion, rhinorrhea and sore throat. Eyes:  Positive for photophobia. Negative for visual disturbance. Respiratory:  Negative for cough and shortness of breath. Cardiovascular:  Negative for chest pain. Gastrointestinal:  Positive for nausea. Negative for abdominal pain and vomiting. Genitourinary:  Negative for decreased urine volume and difficulty urinating. Musculoskeletal:  Negative for arthralgias and myalgias. Skin:  Negative for color change and rash. Neurological:  Positive for dizziness and headaches. Negative for weakness, light-headedness and numbness. Psychiatric/Behavioral:  Negative for agitation and confusion. The patient is not nervous/anxious. Vitals:    04/28/23 1124   BP: 96/60   Pulse: 63   Resp: 16   Temp: 97 °F (36.1 °C)   SpO2: 97%            Physical Exam  Vitals and nursing note reviewed. Constitutional:       Appearance: Normal appearance. HENT:      Head: Normocephalic and atraumatic. Right Ear: External ear normal.      Left Ear: External ear normal.      Nose: Nose normal.      Mouth/Throat:      Mouth: Mucous membranes are moist.   Eyes:      Extraocular Movements: Extraocular movements intact. Conjunctiva/sclera: Conjunctivae normal.      Pupils: Pupils are equal, round, and reactive to light. Comments: Bilateral nystagmus looking to the left   Cardiovascular:      Rate and Rhythm: Normal rate and regular rhythm. Pulses: Normal pulses. Heart sounds: Normal heart sounds. Pulmonary:      Effort: Pulmonary effort is normal.      Breath sounds: Normal breath sounds. Abdominal:      Palpations: Abdomen is soft. Musculoskeletal:         General: Normal range of motion. Cervical back: Normal range of motion and neck supple. Comments: Neck: No bony tenderness along cervical spine. Range of motion intact. Upper back: No bony tenderness along thoracic spine or scapulae. Mild muscular tenderness over posterior shoulder muscles. ROM arms intact. Skin:     General: Skin is warm and dry. Capillary Refill: Capillary refill takes less than 2 seconds. Neurological:      General: No focal deficit present. Mental Status: She is alert and oriented to person, place, and time. Mental status is at baseline. Cranial Nerves: No cranial nerve deficit. Sensory: No sensory deficit. Motor: No weakness. Coordination: Coordination normal.      Gait: Gait normal.   Psychiatric:         Mood and Affect: Mood normal.         Behavior: Behavior normal.         Thought Content: Thought content normal.         Judgment: Judgment normal.        Medical Decision Making  Ddx: Closed head injury, concussion, intracranial abnormality, and others    60-year-old female presents with head injury after ground-level fall this morning. Afebrile, VSS. Exam remarkable for bilateral nystagmus in the left direction, otherwise unremarkable including neuro exam.  CT head unremarkable. Discussed imaging neck with patient and felt this was not warranted based on lack of bony tenderness or pain with range of motion; patient agreeable.   Discharged with Motrin, Tylenol and Zofran for nausea, small Robaxin for upper back pain, PCP follow up, strict return precautions. Amount and/or Complexity of Data Reviewed  Radiology: ordered. Decision-making details documented in ED Course. Risk  Prescription drug management. Procedures    LABORATORY TESTS:  No results found for this or any previous visit (from the past 12 hour(s)). IMAGING RESULTS:  CT HEAD WO CONT   Final Result      No acute intracranial abnormality on this noncontrast head CT. No intracranial   change. Right parietal scalp contusion. No underlying fracture. MEDICATIONS GIVEN:  Medications - No data to display    IMPRESSION:  1. Closed head injury, initial encounter    2. Contusion of scalp, initial encounter        PLAN:  1. Discharge Medication List as of 4/28/2023 12:48 PM        START taking these medications    Details   ondansetron hcl (Zofran) 4 mg tablet Take 1 Tablet by mouth every eight (8) hours as needed for Nausea or Vomiting., Normal, Disp-20 Tablet, R-0      methocarbamoL (ROBAXIN) 750 mg tablet Take 1 Tablet by mouth every six to eight (6-8) hours as needed for Muscle Spasm(s) or Pain., Normal, Disp-15 Tablet, R-0           CONTINUE these medications which have NOT CHANGED    Details   DEXTROMETHORPHAN 30 mg by Does Not Apply route three (3) times daily. , Historical Med      methylphenidate HCl (RITALIN) 5 mg tablet Take  by mouth three (3) times daily. , Historical Med      bupropion HCl (WELLBUTRIN PO) Take 125 mg by mouth three (3) times daily. , Historical Med      LAMOTRIGINE (LAMICTAL PO) Take 100 mg by mouth two (2) times a day., Historical Med           2.    Follow-up Information       Follow up With Specialties Details Why Contact Info    [address] DEP Emergency Medicine Go to  As needed, If symptoms worsen Crispin Haq [address] [facility]    Katie Wan MD Family Medicine Schedule an appointment as soon as possible for a visit   [address] [place] [address] [address] [place] [id number] [phone]            3. Return to ED if worse     Presentation, management, and disposition were discussed with the attending physician, Dr. Lamar Brantley, who is in agreement with plan of care.

## 2023-05-01 ENCOUNTER — HOSPITAL ENCOUNTER (OUTPATIENT)
Dept: MAMMOGRAPHY | Age: 63
Discharge: HOME | End: 2023-05-01
Attending: FAMILY MEDICINE
Payer: MEDICAID

## 2023-05-01 DIAGNOSIS — Z12.31 SCREENING MAMMOGRAM FOR HIGH-RISK PATIENT: ICD-10-CM

## 2023-05-01 PROCEDURE — 77063 BREAST TOMOSYNTHESIS BI: CPT

## 2023-05-18 RX ORDER — METHYLPHENIDATE HYDROCHLORIDE 5 MG/1
TABLET ORAL 3 TIMES DAILY
COMMUNITY

## 2023-05-18 RX ORDER — METHOCARBAMOL 750 MG/1
750 TABLET, FILM COATED ORAL
COMMUNITY
Start: 2023-04-28

## 2023-05-18 RX ORDER — ONDANSETRON 4 MG/1
4 TABLET, FILM COATED ORAL EVERY 8 HOURS PRN
COMMUNITY
Start: 2023-04-28

## 2024-11-08 ENCOUNTER — TRANSCRIBE ORDERS (OUTPATIENT)
Facility: HOSPITAL | Age: 64
End: 2024-11-08

## 2024-11-08 DIAGNOSIS — Z12.31 ENCOUNTER FOR MAMMOGRAM TO ESTABLISH BASELINE MAMMOGRAM: Primary | ICD-10-CM

## 2025-01-16 ENCOUNTER — HOSPITAL ENCOUNTER (INPATIENT)
Facility: HOSPITAL | Age: 65
LOS: 17 days | Discharge: HOME | DRG: 751 | End: 2025-02-03
Attending: STUDENT IN AN ORGANIZED HEALTH CARE EDUCATION/TRAINING PROGRAM | Admitting: PSYCHIATRY & NEUROLOGY
Payer: MEDICAID

## 2025-01-16 DIAGNOSIS — F32.A DEPRESSION, UNSPECIFIED DEPRESSION TYPE: ICD-10-CM

## 2025-01-16 DIAGNOSIS — F32.9 TREATMENT-RESISTANT DEPRESSION: Primary | ICD-10-CM

## 2025-01-16 LAB
ALBUMIN SERPL-MCNC: 2.8 G/DL (ref 3.5–5)
ALBUMIN/GLOB SERPL: 1.2 (ref 1.1–2.2)
ALP SERPL-CCNC: 162 U/L (ref 45–117)
ALT SERPL-CCNC: 224 U/L (ref 12–78)
AMPHET UR QL SCN: NEGATIVE
ANION GAP SERPL CALC-SCNC: 4 MMOL/L (ref 2–12)
APAP SERPL-MCNC: 3 UG/ML (ref 10–30)
APPEARANCE UR: CLEAR
AST SERPL-CCNC: 170 U/L (ref 15–37)
BACTERIA URNS QL MICRO: NEGATIVE /HPF
BARBITURATES UR QL SCN: NEGATIVE
BASOPHILS # BLD: 0.03 K/UL (ref 0–0.1)
BASOPHILS NFR BLD: 0.5 % (ref 0–1)
BENZODIAZ UR QL: NEGATIVE
BILIRUB SERPL-MCNC: 0.9 MG/DL (ref 0.2–1)
BILIRUB UR QL: NEGATIVE
BUN SERPL-MCNC: 10 MG/DL (ref 6–20)
BUN/CREAT SERPL: 17 (ref 12–20)
CALCIUM SERPL-MCNC: 8.3 MG/DL (ref 8.5–10.1)
CANNABINOIDS UR QL SCN: NEGATIVE
CHLORIDE SERPL-SCNC: 105 MMOL/L (ref 97–108)
CO2 SERPL-SCNC: 27 MMOL/L (ref 21–32)
COCAINE UR QL SCN: NEGATIVE
COLOR UR: NORMAL
COMMENT:: NORMAL
CREAT SERPL-MCNC: 0.58 MG/DL (ref 0.55–1.02)
DIFFERENTIAL METHOD BLD: ABNORMAL
EOSINOPHIL # BLD: 0.03 K/UL (ref 0–0.4)
EOSINOPHIL NFR BLD: 0.5 % (ref 0–7)
EPITH CASTS URNS QL MICRO: NORMAL /LPF
ERYTHROCYTE [DISTWIDTH] IN BLOOD BY AUTOMATED COUNT: 13.2 % (ref 11.5–14.5)
ETHANOL SERPL-MCNC: <10 MG/DL (ref 0–0.08)
GLOBULIN SER CALC-MCNC: 2.3 G/DL (ref 2–4)
GLUCOSE SERPL-MCNC: 99 MG/DL (ref 65–100)
GLUCOSE UR STRIP.AUTO-MCNC: NEGATIVE MG/DL
HCT VFR BLD AUTO: 38.3 % (ref 35–47)
HGB BLD-MCNC: 12.6 G/DL (ref 11.5–16)
HGB UR QL STRIP: NEGATIVE
HYALINE CASTS URNS QL MICRO: NORMAL /LPF (ref 0–5)
IMM GRANULOCYTES # BLD AUTO: 0.03 K/UL (ref 0–0.04)
IMM GRANULOCYTES NFR BLD AUTO: 0.5 % (ref 0–0.5)
KETONES UR QL STRIP.AUTO: NEGATIVE MG/DL
LEUKOCYTE ESTERASE UR QL STRIP.AUTO: NEGATIVE
LYMPHOCYTES # BLD: 1.43 K/UL (ref 0.8–3.5)
LYMPHOCYTES NFR BLD: 22.8 % (ref 12–49)
Lab: NORMAL
MCH RBC QN AUTO: 35.5 PG (ref 26–34)
MCHC RBC AUTO-ENTMCNC: 32.9 G/DL (ref 30–36.5)
MCV RBC AUTO: 107.9 FL (ref 80–99)
METHADONE UR QL: NEGATIVE
MONOCYTES # BLD: 0.29 K/UL (ref 0–1)
MONOCYTES NFR BLD: 4.6 % (ref 5–13)
NEUTS SEG # BLD: 4.47 K/UL (ref 1.8–8)
NEUTS SEG NFR BLD: 71.1 % (ref 32–75)
NITRITE UR QL STRIP.AUTO: NEGATIVE
NRBC # BLD: 0 K/UL (ref 0–0.01)
NRBC BLD-RTO: 0 PER 100 WBC
OPIATES UR QL: NEGATIVE
PCP UR QL: NEGATIVE
PH UR STRIP: 6 (ref 5–8)
PLATELET # BLD AUTO: 169 K/UL (ref 150–400)
PMV BLD AUTO: 10.6 FL (ref 8.9–12.9)
POTASSIUM SERPL-SCNC: 4.2 MMOL/L (ref 3.5–5.1)
PROT SERPL-MCNC: 5.1 G/DL (ref 6.4–8.2)
PROT UR STRIP-MCNC: NEGATIVE MG/DL
RBC # BLD AUTO: 3.55 M/UL (ref 3.8–5.2)
RBC #/AREA URNS HPF: NORMAL /HPF (ref 0–5)
SALICYLATES SERPL-MCNC: <1.7 MG/DL (ref 2.8–20)
SODIUM SERPL-SCNC: 136 MMOL/L (ref 136–145)
SP GR UR REFRACTOMETRY: 1 (ref 1–1.03)
SPECIMEN HOLD: NORMAL
URINE CULTURE IF INDICATED: NORMAL
UROBILINOGEN UR QL STRIP.AUTO: 0.2 EU/DL (ref 0.2–1)
WBC # BLD AUTO: 6.3 K/UL (ref 3.6–11)
WBC URNS QL MICRO: NORMAL /HPF (ref 0–4)

## 2025-01-16 PROCEDURE — 80307 DRUG TEST PRSMV CHEM ANLYZR: CPT

## 2025-01-16 PROCEDURE — 36415 COLL VENOUS BLD VENIPUNCTURE: CPT

## 2025-01-16 PROCEDURE — 80053 COMPREHEN METABOLIC PANEL: CPT

## 2025-01-16 PROCEDURE — 82077 ASSAY SPEC XCP UR&BREATH IA: CPT

## 2025-01-16 PROCEDURE — 80179 DRUG ASSAY SALICYLATE: CPT

## 2025-01-16 PROCEDURE — 99285 EMERGENCY DEPT VISIT HI MDM: CPT

## 2025-01-16 PROCEDURE — 80143 DRUG ASSAY ACETAMINOPHEN: CPT

## 2025-01-16 PROCEDURE — 81001 URINALYSIS AUTO W/SCOPE: CPT

## 2025-01-16 PROCEDURE — 85025 COMPLETE CBC W/AUTO DIFF WBC: CPT

## 2025-01-16 ASSESSMENT — PAIN SCALES - GENERAL: PAINLEVEL_OUTOF10: 0

## 2025-01-16 ASSESSMENT — PAIN - FUNCTIONAL ASSESSMENT: PAIN_FUNCTIONAL_ASSESSMENT: 0-10

## 2025-01-17 PROBLEM — F33.0 MDD (MAJOR DEPRESSIVE DISORDER), RECURRENT EPISODE, MILD (HCC): Status: ACTIVE | Noted: 2025-01-17

## 2025-01-17 PROCEDURE — 82306 VITAMIN D 25 HYDROXY: CPT

## 2025-01-17 PROCEDURE — 1240000000 HC EMOTIONAL WELLNESS R&B

## 2025-01-17 PROCEDURE — 36415 COLL VENOUS BLD VENIPUNCTURE: CPT

## 2025-01-17 PROCEDURE — 6370000000 HC RX 637 (ALT 250 FOR IP): Performed by: STUDENT IN AN ORGANIZED HEALTH CARE EDUCATION/TRAINING PROGRAM

## 2025-01-17 PROCEDURE — 6370000000 HC RX 637 (ALT 250 FOR IP): Performed by: NURSE PRACTITIONER

## 2025-01-17 PROCEDURE — 82607 VITAMIN B-12: CPT

## 2025-01-17 PROCEDURE — 6370000000 HC RX 637 (ALT 250 FOR IP): Performed by: PSYCHIATRY & NEUROLOGY

## 2025-01-17 PROCEDURE — 82746 ASSAY OF FOLIC ACID SERUM: CPT

## 2025-01-17 RX ORDER — IBUPROFEN 200 MG
1 CAPSULE ORAL DAILY
Status: ON HOLD | COMMUNITY
End: 2025-02-03 | Stop reason: HOSPADM

## 2025-01-17 RX ORDER — TRAZODONE HYDROCHLORIDE 50 MG/1
50 TABLET, FILM COATED ORAL NIGHTLY PRN
Status: DISCONTINUED | OUTPATIENT
Start: 2025-01-17 | End: 2025-01-21

## 2025-01-17 RX ORDER — IBUPROFEN 400 MG/1
600 TABLET, FILM COATED ORAL
Status: COMPLETED | OUTPATIENT
Start: 2025-01-17 | End: 2025-01-17

## 2025-01-17 RX ORDER — ACETAMINOPHEN 325 MG/1
650 TABLET ORAL EVERY 4 HOURS PRN
Status: DISCONTINUED | OUTPATIENT
Start: 2025-01-17 | End: 2025-02-03 | Stop reason: HOSPADM

## 2025-01-17 RX ORDER — HYDROXYZINE HYDROCHLORIDE 50 MG/1
50 TABLET, FILM COATED ORAL 3 TIMES DAILY PRN
Status: DISCONTINUED | OUTPATIENT
Start: 2025-01-17 | End: 2025-02-03 | Stop reason: HOSPADM

## 2025-01-17 RX ORDER — POLYETHYLENE GLYCOL 3350 17 G/17G
17 POWDER, FOR SOLUTION ORAL DAILY PRN
Status: DISCONTINUED | OUTPATIENT
Start: 2025-01-17 | End: 2025-02-03 | Stop reason: HOSPADM

## 2025-01-17 RX ORDER — VENLAFAXINE HYDROCHLORIDE 37.5 MG/1
150 TABLET, EXTENDED RELEASE ORAL
Status: DISCONTINUED | OUTPATIENT
Start: 2025-01-18 | End: 2025-01-17

## 2025-01-17 RX ORDER — TRAZODONE HYDROCHLORIDE 50 MG/1
50 TABLET, FILM COATED ORAL NIGHTLY PRN
Status: DISCONTINUED | OUTPATIENT
Start: 2025-01-17 | End: 2025-01-31

## 2025-01-17 RX ORDER — DESVENLAFAXINE 50 MG/1
50 TABLET, FILM COATED, EXTENDED RELEASE ORAL DAILY
Status: ON HOLD | COMMUNITY
End: 2025-02-03 | Stop reason: HOSPADM

## 2025-01-17 RX ORDER — TRAZODONE HYDROCHLORIDE 50 MG/1
50 TABLET, FILM COATED ORAL NIGHTLY
Status: DISCONTINUED | OUTPATIENT
Start: 2025-01-17 | End: 2025-01-17

## 2025-01-17 RX ORDER — TRAZODONE HYDROCHLORIDE 50 MG/1
50 TABLET, FILM COATED ORAL NIGHTLY
Status: ON HOLD | COMMUNITY
End: 2025-01-17 | Stop reason: ALTCHOICE

## 2025-01-17 RX ORDER — HALOPERIDOL 5 MG/ML
5 INJECTION INTRAMUSCULAR EVERY 4 HOURS PRN
Status: DISCONTINUED | OUTPATIENT
Start: 2025-01-17 | End: 2025-02-03 | Stop reason: HOSPADM

## 2025-01-17 RX ORDER — MIRTAZAPINE 15 MG/1
7.5 TABLET, FILM COATED ORAL NIGHTLY
Status: DISCONTINUED | OUTPATIENT
Start: 2025-01-17 | End: 2025-01-18

## 2025-01-17 RX ORDER — HALOPERIDOL 5 MG/1
5 TABLET ORAL EVERY 4 HOURS PRN
Status: DISCONTINUED | OUTPATIENT
Start: 2025-01-17 | End: 2025-02-03 | Stop reason: HOSPADM

## 2025-01-17 RX ORDER — M-VIT,TX,IRON,MINS/CALC/FOLIC 27MG-0.4MG
1 TABLET ORAL DAILY
Status: ON HOLD | COMMUNITY
End: 2025-02-03 | Stop reason: HOSPADM

## 2025-01-17 RX ORDER — MIRTAZAPINE 7.5 MG/1
7.5 TABLET, FILM COATED ORAL NIGHTLY PRN
Status: ON HOLD | COMMUNITY
End: 2025-02-03 | Stop reason: HOSPADM

## 2025-01-17 RX ORDER — DIPHENHYDRAMINE HYDROCHLORIDE 50 MG/ML
50 INJECTION INTRAMUSCULAR; INTRAVENOUS EVERY 4 HOURS PRN
Status: DISCONTINUED | OUTPATIENT
Start: 2025-01-17 | End: 2025-02-03 | Stop reason: HOSPADM

## 2025-01-17 RX ORDER — SENNOSIDES A AND B 8.6 MG/1
1 TABLET, FILM COATED ORAL DAILY PRN
Status: DISCONTINUED | OUTPATIENT
Start: 2025-01-17 | End: 2025-02-03 | Stop reason: HOSPADM

## 2025-01-17 RX ORDER — MAGNESIUM HYDROXIDE/ALUMINUM HYDROXICE/SIMETHICONE 120; 1200; 1200 MG/30ML; MG/30ML; MG/30ML
30 SUSPENSION ORAL EVERY 6 HOURS PRN
Status: DISCONTINUED | OUTPATIENT
Start: 2025-01-17 | End: 2025-02-03 | Stop reason: HOSPADM

## 2025-01-17 RX ORDER — VENLAFAXINE HYDROCHLORIDE 150 MG/1
150 CAPSULE, EXTENDED RELEASE ORAL
Status: DISCONTINUED | OUTPATIENT
Start: 2025-01-17 | End: 2025-01-20

## 2025-01-17 RX ADMIN — MIRTAZAPINE 7.5 MG: 15 TABLET, FILM COATED ORAL at 21:22

## 2025-01-17 RX ADMIN — TRAZODONE HYDROCHLORIDE 50 MG: 50 TABLET ORAL at 01:56

## 2025-01-17 RX ADMIN — IBUPROFEN 600 MG: 400 TABLET, FILM COATED ORAL at 01:55

## 2025-01-17 RX ADMIN — VENLAFAXINE HYDROCHLORIDE 150 MG: 150 CAPSULE, EXTENDED RELEASE ORAL at 12:10

## 2025-01-17 RX ADMIN — ACETAMINOPHEN 650 MG: 325 TABLET ORAL at 15:36

## 2025-01-17 ASSESSMENT — PAIN SCALES - GENERAL
PAINLEVEL_OUTOF10: 7
PAINLEVEL_OUTOF10: 7

## 2025-01-17 ASSESSMENT — PAIN DESCRIPTION - DESCRIPTORS
DESCRIPTORS: DISCOMFORT
DESCRIPTORS: ACHING

## 2025-01-17 ASSESSMENT — SLEEP AND FATIGUE QUESTIONNAIRES
DO YOU HAVE DIFFICULTY SLEEPING: NO
AVERAGE NUMBER OF SLEEP HOURS: 9
SLEEP PATTERN: DIFFICULTY FALLING ASLEEP
DO YOU USE A SLEEP AID: NO

## 2025-01-17 ASSESSMENT — PAIN DESCRIPTION - LOCATION
LOCATION: HEAD

## 2025-01-17 ASSESSMENT — PAIN - FUNCTIONAL ASSESSMENT: PAIN_FUNCTIONAL_ASSESSMENT: ACTIVITIES ARE NOT PREVENTED

## 2025-01-17 ASSESSMENT — PAIN DESCRIPTION - ORIENTATION: ORIENTATION: MID

## 2025-01-17 ASSESSMENT — LIFESTYLE VARIABLES
HOW OFTEN DO YOU HAVE A DRINK CONTAINING ALCOHOL: NEVER
HOW MANY STANDARD DRINKS CONTAINING ALCOHOL DO YOU HAVE ON A TYPICAL DAY: PATIENT DOES NOT DRINK

## 2025-01-17 NOTE — ED NOTES
Pt changed into green gown. Belongings placed in personal belongings bag    2007: Pt wanded by security

## 2025-01-17 NOTE — H&P
release tablet Take 1 tablet by mouth daily   Yes Provider, MD Ranjit   mirtazapine (REMERON) 7.5 MG tablet Take 1 tablet by mouth nightly   Yes Provider, MD Ranjit   cariprazine hcl (VRAYLAR) 4.5 MG CAPS capsule Take 1 capsule by mouth daily    ProviderRanjit MD   traZODone (DESYREL) 50 MG tablet Take 1 tablet by mouth nightly    Provider, MD Ranjit     Lab Results   Component Value Date    WBC 6.3 2025    HGB 12.6 2025    HCT 38.3 2025    .9 (H) 2025     2025     Lab Results   Component Value Date     2025    K 4.2 2025     2025    CO2 27 2025    BUN 10 2025    CREATININE 0.58 2025    GLUCOSE 99 2025    CALCIUM 8.3 (L) 2025    BILITOT 0.9 2025    ALKPHOS 162 (H) 2025     (H) 2025     (H) 2025    LABGLOM >90 2025    GLOB 2.3 2025     FAMILY HISTORY:  No past suicide attempts/completions  No Bipolar disorder or Schizophrenia diagnoses in the family  No Substance use in the family    PSYCHOSOCIAL HISTORY:  Lives in [place].  Lives alone.  No significant other  No children.  Friend taking caring of dog.  1 brother in [place].  Parents .  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    MENTAL STATUS EXAM:  65yo WF has short black hair is thin. She is subdued. She is softspoken and is engaged in the evaluation.  Speech: Spontaneous, has NL rate, volume and prosody.  Thought Process is Logical, linear, and goal directed.  Mood is reported as \"okay\"  Affect is congruent and dysthymic  Passive death wish, but no cr suicidal intent or plan.  No Homicidal thoughts, intents or plans. Has a fire-arm that is antique, that doesn't work or have ammo.  Denies experiencing hallucinations of any type.  Perception is negative for paranoia or delusional thinking  Attention/Concentration are both

## 2025-01-17 NOTE — BSMART NOTE
Bsmart Progress note:    Pt accepted to [facility] room 748/01  Dr Moctezuma  ext [phone] for report   Writer update Pat in ED

## 2025-01-17 NOTE — ED NOTES
TRANSFER - OUT REPORT:    Verbal report given to Shanel, RN on Elvia Natarajan  being transferred to [facility] for routine progression of patient care       Report consisted of patient's Situation, Background, Assessment and   Recommendations(SBAR).     Information from the following report(s) ED Encounter Summary, ED SBAR, MAR, and Recent Results was reviewed with the receiving nurse.    [place] Fall Assessment:    Presents to emergency department  because of falls (Syncope, seizure, or loss of consciousness): No  Age > 70: No  Altered Mental Status, Intoxication with alcohol or substance confusion (Disorientation, impaired judgment, poor safety awaremess, or inability to follow instructions): No  Impaired Mobility: Ambulates or transfers with assistive devices or assistance; Unable to ambulate or transer.: No  Nursing Judgement: No          Lines:       Opportunity for questions and clarification was provided.      Patient transported with:  Tech

## 2025-01-17 NOTE — PLAN OF CARE
Problem: Depression  Goal: Will be euthymic at discharge  Description: INTERVENTIONS:  1. Administer medication as ordered  2. Provide emotional support via 1:1 interaction with staff  3. Encourage involvement in milieu/groups/activities  4. Monitor for social isolation  1/17/2025 1747 by Cinthia Khan, RN  Outcome: Progressing  1/17/2025 1107 by Rere Weathers RN  Outcome: Progressing  Note: Isolative to room, bed and self. Noted poor hygiene with multiple clothes in belongings that were soiled. Pt has insight into her poor hygiene and her lack of motivation and energy to shower on unit. Upon admission clothes washed. Review medications with pt reporting current meds not effective with her depressed mood along with impaired memory and increase sleeping. Verbalized understanding of her current plan of care.      Problem: Anxiety  Goal: Will report anxiety at manageable levels  Description: INTERVENTIONS:  1. Administer medication as ordered  2. Teach and rehearse alternative coping skills  3. Provide emotional support with 1:1 interaction with staff  Outcome: Progressing

## 2025-01-17 NOTE — ED TRIAGE NOTES
Pt comes in from home with CC of mental health issues. Pt reports she would not hurt herself, but would not stop someone else hurting her. Pt reports she has a hx of depression.

## 2025-01-17 NOTE — CARE COORDINATION
01/17/25 1322   ITP   Date of Plan 01/17/25   Date of Next Review 01/24/25   Primary Diagnosis Code MDD   Barriers to Treatment Need for psychoeducation   Strengths Incorporated in Plan Acknowledging need for assistance   Plan of Care   Long Term Goal (LTG) Stated in patient/guardian terms Participate in out pt tx 12 wks   Short Term Goal 1   Short Term Goal 1 Client will remain safe during stay   Baseline Functioning passive SI, depression   Target pt will maintain safety   Objectives Client will participate in group therapy   Intervention 1 Assess safety   Frequency daily   Measured by Staff observation;Self report;Behavioral data   Staff Responsible Clinical staff;[facility] staff   Intervention 2 Acknowledge client strengths   Frequency daily   Measured by Staff observation;Self report;Behavioral data   Staff Responsible Clinical staff;[facility] staff   Intervention 3 Group therapy   Frequency daily   Measured by Staff observation;Self report;Behavioral data   Staff Responsible Clinical staff;[facility] staff   STG Goal 1 Status: Patient Appears to be  Progressing toward treatment plan goal   Short Term Goal 2   Short Term Goal 2 Client will maintain compliance with medication regime   Baseline Functioning ineffective medications.   Target medication adjustment and compliance   Intervention 1 Monitor medications   Frequency daily   Measured by Staff observation;Behavioral data   Staff Responsible Clinical staff;[facility] staff   Intervention 2 Referral to community services   Frequency daily   Staff Responsible Clinical staff   STG Goal 2 Status: Patient Appears to be  Progressing toward treatment plan goal   Crisis/Safety/Discharge Plan   Crisis/Safety Plan Standard program interventions and protocol   Comprehensive Assessment Completion Date 01/17/25   Discharge Plan Pt to discharge home with follow up care

## 2025-01-17 NOTE — BSMART NOTE
Comprehensive Assessment Form Part 1      Section I - Disposition    Primary Diagnosis: Major Depressive Disorder per hx  Secondary Diagnosis: N/A  Past Medical History:   Diagnosis Date    Arthritis     OSTEO    Nausea & vomiting     YULIANA (obstructive sleep apnea)     on CPAP for last 3 y ears    Psychiatric disorder     DEPRESSION    Sleep apnea     uses c pap         The Medical Doctor to Psychiatrist conference was not completed.  The Medical Doctor is in agreement with Psychiatrist disposition because of (reason) the patient was recommended for admission [facility] .  The plan is voluntary admission to [facility].  The on-call Psychiatrist consulted was  N/A.  The admitting Psychiatrist will be Dr. HENRY.  The admitting Diagnosis is Major Depressive Disorder.  The Payor source [place] [facility] [place] .      This writer reviewed the [place] Suicide Severity Rating Scale in nursing flowsheet and the risk level assigned is low risk.  Based on this assessment, the risk of suicide is low risk and the plan is voluntary admission to [facility].    Section II - Integrated Summary  Summary:  Per triage note: \"Pt comes in from home with CC of mental health issues. Pt reports she would not hurt herself, but would not stop someone else hurting her. Pt reports she has a hx of depression.\"    The patient is a 64 year old, female, assessed face to face at [facility] ED. The patient was oriented 4x and consented to this assessment. The patient endorsed passive thoughts of SI without plan or hx of attempts. The patient reported increased hopelessness and noted \"if I was to get hit by a train, I wouldn't mind or stop it\". The patient denied thoughts of intentionally placing herself in the path of a train. The patient reported financial stressors as the main trigger. She has declined in participating in her ADLs, citing the lack of motivation to bathe or shower. The patient reported she is unable to recall the last time she engaged in her

## 2025-01-17 NOTE — ED PROVIDER NOTES
[facility] EMERGENCY DEPARTMENT  EMERGENCY DEPARTMENT ENCOUNTER      Pt Name: Elvia Natarajan  MRN: [id number]  Birthdate 1960  Date of evaluation: 1/16/2025  Provider: Minesh Nix PA-C    CHIEF COMPLAINT       Chief Complaint   Patient presents with    Depression         HISTORY OF PRESENT ILLNESS    Patient is an 64 y.o. 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  Patient reports she feels very depressed over the past few days.  No triggering factors.  Patient reports that she would not like to hurt herself or kill herself, but would not stop something else from hurting or killing her.  Patient reports that she does take Pristiq daily for her depression.  Patient has not been able to follow-up with her outpatient provider in a little while. Patient denies chest pain, shortness of breath, abdominal pain, urinary symptoms, nausea or vomiting, diarrhea or constipation, headache, dizziness, lightheadedness, fever or chills.  Patient denies alcohol use, denies smoking/vaping or illicit drug use.          Nursing Notes were reviewed.    REVIEW OF SYSTEMS       Review of Systems      PAST MEDICAL HISTORY     Past Medical History:   Diagnosis Date    Arthritis     OSTEO    Nausea & vomiting     YULINAA (obstructive sleep apnea)     on CPAP for last 3 y ears    Psychiatric disorder     DEPRESSION    Sleep apnea     uses c pap         SURGICAL HISTORY       Past Surgical History:   Procedure Laterality Date    GI      COLONOSCOPY MULTIPLE WITH REMOVAL OF BENIGN POLYPS    GYN      LT OOPHORECTOMY    ORTHOPEDIC SURGERY      LLT MENISCUS CONDROPLASTY    ORTHOPEDIC SURGERY      LT ROTATOR CUFF 2 TIMES RT ROTATOR 1 TIME    ORTHOPEDIC SURGERY      BILATERAL KNEE REPLACEMENTS    OVARY REMOVAL      Left ovary removed.    [place] UNLISTED PROCEDURE ABDOMEN PERITONEUM & OMENTUM  2018    ABDOMINAL - DUODENAL SWITCH, WITH

## 2025-01-17 NOTE — BSMART NOTE
BSMART assessment completed, and suicide risk level noted to be low. Charge Nurse ROSA Scanlon and Physician IDA Nix and DO Meena notified. Concerns not observed.

## 2025-01-17 NOTE — ED PROVIDER NOTES
1:21 AM    I have reviewed patient's labs as well as chart patient has been deemed medically stable and medically cleared for inpatient psychiatric admission.     Immanuel Cornejo MD  01/17/25 0121

## 2025-01-17 NOTE — PLAN OF CARE
Problem: Depression  Goal: Will be euthymic at discharge  Description: INTERVENTIONS:  1. Administer medication as ordered  2. Provide emotional support via 1:1 interaction with staff  3. Encourage involvement in milieu/groups/activities  4. Monitor for social isolation  Outcome: Progressing  Note: Isolative to room, bed and self. Noted poor hygiene with multiple clothes in belongings that were soiled. Pt has insight into her poor hygiene and her lack of motivation and energy to shower on unit. Upon admission clothes washed. Review medications with pt reporting current meds not effective with her depressed mood along with impaired memory and increase sleeping. Verbalized understanding of her current plan of care.

## 2025-01-17 NOTE — CARE COORDINATION
01/17/25 1320   Suicidal Ideation   Wish to be Dead Lifetime - No;Past 1 month - No   Non-Specific Active Suicidal Thoughts Lifetime - No;Past 1 month - Yes   Suicidal Behavior Trigger Non-Specific Active Suicidal Thoughts   Active Suicidal Ideation with Any Methods (Not Plan) without Intent to Act Lifetime - No;Past 1 month - Yes   Active Suicidal Ideation with Some Intent to Act, without Specific Plan Lifetime - No;Past 1 month - No   Active Suicidal Ideation with Specific Plan and Intent Lifetime- No;Past 1 month - No   Intensity of Ideation   Lifetime - Most Severe Ideation 1 - least severe   Lifetime - Most Recent Ideation 1 - least severe   Frequency Once a week   Duration Less than 1 hour/some of the time   Controllability Can control thoughts with some difficulty   Deterrents Does not apply   Reasons for Ideation Does not apply   Suicidal Behavior   Actual Attempt Lifetime - No;Past 3 months - No   Total # of Attempts 0   Has subject engaged in Non-Suicidal Self-Injurious Behavior? Lifetime - No;Past 3 months - No   Interrupted Attempt Lifetime - No;Past 3 months - No   Total # of interrupted 0   Aborted or Self-Interrupted Attempt Lifetime - No;Past 3 months - No   Total # of aborted or self-interrupted 0   Preparatory Acts or Behavior Lifetime - No;Past 3 months - No   Total # of Preparatory Acts 0   Actual Lethality/Medical Damage 0   Potential Lethality 0

## 2025-01-18 LAB
25(OH)D3 SERPL-MCNC: 29.5 NG/ML (ref 30–100)
FOLATE SERPL-MCNC: 8.2 NG/ML (ref 5–21)
VIT B12 SERPL-MCNC: 602 PG/ML (ref 193–986)

## 2025-01-18 PROCEDURE — 6370000000 HC RX 637 (ALT 250 FOR IP): Performed by: PSYCHIATRY & NEUROLOGY

## 2025-01-18 PROCEDURE — 1240000000 HC EMOTIONAL WELLNESS R&B

## 2025-01-18 PROCEDURE — 6370000000 HC RX 637 (ALT 250 FOR IP): Performed by: NURSE PRACTITIONER

## 2025-01-18 RX ORDER — MIRTAZAPINE 15 MG/1
15 TABLET, FILM COATED ORAL NIGHTLY
Status: DISCONTINUED | OUTPATIENT
Start: 2025-01-18 | End: 2025-01-23

## 2025-01-18 RX ADMIN — TRAZODONE HYDROCHLORIDE 50 MG: 50 TABLET ORAL at 21:09

## 2025-01-18 RX ADMIN — VENLAFAXINE HYDROCHLORIDE 150 MG: 150 CAPSULE, EXTENDED RELEASE ORAL at 09:04

## 2025-01-18 RX ADMIN — MIRTAZAPINE 15 MG: 15 TABLET, FILM COATED ORAL at 20:59

## 2025-01-18 NOTE — PLAN OF CARE
Patient resting in bed with eyes closed. No needs voiced to staff at this time, no respiratory distress noted. Patient in NAD, and monitored 1:1 with a sitter at bedside for CPAP use overnight.  Problem: Depression  Goal: Will be euthymic at discharge  Description: INTERVENTIONS:  1. Administer medication as ordered  2. Provide emotional support via 1:1 interaction with staff  3. Encourage involvement in milieu/groups/activities  4. Monitor for social isolation  1/17/2025 2328 by Coty Louie, RN  Outcome: Progressing     Problem: Anxiety  Goal: Will report anxiety at manageable levels  Description: INTERVENTIONS:  1. Administer medication as ordered  2. Teach and rehearse alternative coping skills  3. Provide emotional support with 1:1 interaction with staff  1/17/2025 2328 by Coty Louie, RN  Outcome: Progressing

## 2025-01-18 NOTE — PLAN OF CARE
Problem: Depression  Goal: Will be euthymic at discharge  Description: INTERVENTIONS:  1. Administer medication as ordered  2. Provide emotional support via 1:1 interaction with staff  3. Encourage involvement in milieu/groups/activities  4. Monitor for social isolation  1/18/2025 0940 by Rere Weathers RN  Outcome: Progressing  Note: Isolative, withdrawn and describing hopelessness. Continues to voice not having energy to be alive and content with dying. Denies SI, no plan or self harm. States she is motivated to take prescribed medications and aware that she could improve. Poor hygiene continues with no motivation to address hygiene. Declined to make a daily goal. Staff focus is on offering support and encourage shower

## 2025-01-18 NOTE — PLAN OF CARE
Problem: Discharge Planning  Goal: Discharge to home or other facility with appropriate resources  Outcome: Progressing     Problem: Depression  Goal: Will be euthymic at discharge  Description: INTERVENTIONS:  1. Administer medication as ordered  2. Provide emotional support via 1:1 interaction with staff  3. Encourage involvement in milieu/groups/activities  4. Monitor for social isolation  1/18/2025 1547 by Traci Guardado, RN  Outcome: Progressing  1/18/2025 0940 by Rere Weathers RN  Outcome: Progressing  Note: Isolative, withdrawn and describing hopelessness. Continues to voice not having energy to be alive and content with dying. Denies SI, no plan or self harm. States she is motivated to take prescribed medications and aware that she could improve. Poor hygiene continues with no motivation to address hygiene. Declined to make a daily goal. Staff focus is on offering support and encourage shower     Problem: Anxiety  Goal: Will report anxiety at manageable levels  Description: INTERVENTIONS:  1. Administer medication as ordered  2. Teach and rehearse alternative coping skills  3. Provide emotional support with 1:1 interaction with staff  Outcome: Progressing

## 2025-01-19 PROCEDURE — 1240000000 HC EMOTIONAL WELLNESS R&B

## 2025-01-19 PROCEDURE — 6370000000 HC RX 637 (ALT 250 FOR IP): Performed by: NURSE PRACTITIONER

## 2025-01-19 PROCEDURE — 6370000000 HC RX 637 (ALT 250 FOR IP): Performed by: PSYCHIATRY & NEUROLOGY

## 2025-01-19 RX ADMIN — ALUMINUM HYDROXIDE, MAGNESIUM HYDROXIDE, AND SIMETHICONE 30 ML: 1200; 120; 1200 SUSPENSION ORAL at 21:13

## 2025-01-19 RX ADMIN — TRAZODONE HYDROCHLORIDE 50 MG: 50 TABLET ORAL at 20:32

## 2025-01-19 RX ADMIN — VENLAFAXINE HYDROCHLORIDE 150 MG: 150 CAPSULE, EXTENDED RELEASE ORAL at 08:35

## 2025-01-19 RX ADMIN — ACETAMINOPHEN 650 MG: 325 TABLET ORAL at 21:12

## 2025-01-19 RX ADMIN — MIRTAZAPINE 15 MG: 15 TABLET, FILM COATED ORAL at 20:32

## 2025-01-19 ASSESSMENT — PAIN SCALES - GENERAL: PAINLEVEL_OUTOF10: 7

## 2025-01-19 ASSESSMENT — PAIN DESCRIPTION - DESCRIPTORS: DESCRIPTORS: ACHING

## 2025-01-19 ASSESSMENT — PAIN DESCRIPTION - LOCATION: LOCATION: HEAD

## 2025-01-19 NOTE — PLAN OF CARE
Problem: Depression  Goal: Will be euthymic at discharge  Description: INTERVENTIONS:  1. Administer medication as ordered  2. Provide emotional support via 1:1 interaction with staff  3. Encourage involvement in milieu/groups/activities  4. Monitor for social isolation  Outcome: Progressing  Note: Flat to sad, mood \"subdued\". Passive death wish continues. Increase time out of bed, improved self care with completing ADL's with no prompting required. Staff focus is on offering support and reassurance.

## 2025-01-19 NOTE — PLAN OF CARE
Pt is resting in bed quietly with eyes closed, appears asleep. Respirations are even and unlabored, NAD. Safety measures are in place and Q15 minute rounds done. Will continue to monitor.   Problem: Sleep Disturbance  Goal: Will exhibit normal sleeping pattern  Description: INTERVENTIONS:  1. Administer medication as ordered  2. Decrease environmental stimuli, including noise, as appropriate  3. Discourage social isolation and naps during the day  Outcome: Progressing

## 2025-01-20 PROCEDURE — 6370000000 HC RX 637 (ALT 250 FOR IP): Performed by: PSYCHIATRY & NEUROLOGY

## 2025-01-20 PROCEDURE — 1240000000 HC EMOTIONAL WELLNESS R&B

## 2025-01-20 PROCEDURE — 6370000000 HC RX 637 (ALT 250 FOR IP): Performed by: NURSE PRACTITIONER

## 2025-01-20 RX ORDER — VENLAFAXINE 37.5 MG/1
75 TABLET ORAL
Status: DISCONTINUED | OUTPATIENT
Start: 2025-01-21 | End: 2025-01-22

## 2025-01-20 RX ADMIN — TRAZODONE HYDROCHLORIDE 50 MG: 50 TABLET ORAL at 21:28

## 2025-01-20 RX ADMIN — MIRTAZAPINE 15 MG: 15 TABLET, FILM COATED ORAL at 21:01

## 2025-01-20 RX ADMIN — VENLAFAXINE HYDROCHLORIDE 150 MG: 150 CAPSULE, EXTENDED RELEASE ORAL at 10:02

## 2025-01-20 ASSESSMENT — PAIN SCALES - GENERAL: PAINLEVEL_OUTOF10: 0

## 2025-01-20 NOTE — PLAN OF CARE
Pt is resting in bed with eyes closed, appears asleep. Respirations are even and unlabored, NAD. Safety measures are in place and Q 15 minute rounds done.   Problem: Sleep Disturbance  Goal: Will exhibit normal sleeping pattern  Description: INTERVENTIONS:  1. Administer medication as ordered  2. Decrease environmental stimuli, including noise, as appropriate  3. Discourage social isolation and naps during the day  Outcome: Progressing

## 2025-01-20 NOTE — PLAN OF CARE
Problem: Depression  Goal: Will be euthymic at discharge  Description: INTERVENTIONS:  1. Administer medication as ordered  2. Provide emotional support via 1:1 interaction with staff  3. Encourage involvement in milieu/groups/activities  4. Monitor for social isolation  Outcome: Progressing  Note: Out on unit passively engaged, appears flat, describes hopelessness and passive death wish. No suicidal plan with no intent and no self harming behaviors. Verbalizes understanding of medication adjustments. Staff focus is on offering support.

## 2025-01-20 NOTE — PLAN OF CARE
Problem: Depression  Goal: Will be euthymic at discharge  Description: INTERVENTIONS:  1. Administer medication as ordered  2. Provide emotional support via 1:1 interaction with staff  3. Encourage involvement in milieu/groups/activities  4. Monitor for social isolation  Outcome: Not Progressing    Problem: Anxiety  Goal: Will report anxiety at manageable levels  Description: INTERVENTIONS:  1. Administer medication as ordered  2. Teach and rehearse alternative coping skills  3. Provide emotional support with 1:1 interaction with staff

## 2025-01-20 NOTE — INTERDISCIPLINARY ROUNDS
Behavioral Health Interdisciplinary Rounds     Patient Name: Elvia Natarajan  Age: 64 y.o.  Room/Bed:  8/  Primary Diagnosis: MDD (major depressive disorder), recurrent episode, mild (HCC)   Admission Status: Voluntary     Readmission within 30 days:   Power of  in place:   Patient requires a blocked bed: No          Reason for blocked bed: N/A  Sleep hours: 7+  Flu vaccine : not received this season      Participation in Care/Groups:  Yes  Medication Compliant?:  Medication Compliant: Yes  PRNS (last 24 hours):  PRNS: Sleep Aid and Pain    Restraints (last 24 hours):  NO  __________________________________________________    24 hour chart check complete: Yes    _______________________________________________    Patient goal(s) for today: meet with treatment team   Treatment team focus/goals: Plan to titrate her medications   Progress note: She denies SI , denies any issues with her medications or treatment, she did shower and has been coming out of her room more      Spiritual Care Consult:   Financial concerns/prescription coverage:    Family contact: -report her friend was on vacation in [place]                        Family requesting physician contact today:    Discharge plan: she will return home   Access to weapons :    no                                                          Outpatient provider(s): [facility] - She will need a therapist     LOS:  3  Expected LOS: TBD     Participating treatment team members: Margaret Gallagher SW- Dr. Jean-Claude Weathers RN

## 2025-01-21 PROCEDURE — 1240000000 HC EMOTIONAL WELLNESS R&B

## 2025-01-21 PROCEDURE — 6370000000 HC RX 637 (ALT 250 FOR IP): Performed by: NURSE PRACTITIONER

## 2025-01-21 PROCEDURE — 6370000000 HC RX 637 (ALT 250 FOR IP): Performed by: PSYCHIATRY & NEUROLOGY

## 2025-01-21 RX ORDER — VITAMIN B COMPLEX
1000 TABLET ORAL DAILY
Status: DISCONTINUED | OUTPATIENT
Start: 2025-01-21 | End: 2025-01-31

## 2025-01-21 RX ORDER — MULTIVITAMIN WITH IRON
1 TABLET ORAL DAILY
Status: DISCONTINUED | OUTPATIENT
Start: 2025-01-21 | End: 2025-01-31

## 2025-01-21 RX ORDER — MULTIVITAMIN WITH IRON
500 TABLET ORAL DAILY
Status: DISCONTINUED | OUTPATIENT
Start: 2025-01-21 | End: 2025-02-03 | Stop reason: HOSPADM

## 2025-01-21 RX ADMIN — THERA TABS 1 TABLET: TAB at 11:00

## 2025-01-21 RX ADMIN — MIRTAZAPINE 15 MG: 15 TABLET, FILM COATED ORAL at 20:43

## 2025-01-21 RX ADMIN — CYANOCOBALAMIN TAB 500 MCG 500 MCG: 500 TAB at 11:00

## 2025-01-21 RX ADMIN — VENLAFAXINE 75 MG: 37.5 TABLET ORAL at 20:43

## 2025-01-21 RX ADMIN — Medication 1000 UNITS: at 11:00

## 2025-01-21 RX ADMIN — VENLAFAXINE 75 MG: 37.5 TABLET ORAL at 08:29

## 2025-01-21 NOTE — PLAN OF CARE
Problem: Depression  Goal: Will be euthymic at discharge  Description: INTERVENTIONS:  1. Administer medication as ordered  2. Provide emotional support via 1:1 interaction with staff  3. Encourage involvement in milieu/groups/activities  4. Monitor for social isolation  Outcome: Progressing  Note: Out on unit passively engaged, flat affect, mood remains depressed with decrease in passive death wish. Following unit routine and nursing direction. Attending group this am. Reports motivated to attend group and restart therapy. Verbalized understanding of medication adjustments. Staff focus is on offering support and reassurance.

## 2025-01-21 NOTE — PLAN OF CARE
Problem: Depression  Goal: Will be euthymic at discharge  Description: INTERVENTIONS:  1. Administer medication as ordered  2. Provide emotional support via 1:1 interaction with staff  3. Encourage involvement in milieu/groups/activities  4. Monitor for social isolation  1/21/2025 4810 by Radha Cerda RN  Outcome: Progressing    Problem: Anxiety  Goal: Will report anxiety at manageable levels  Description: INTERVENTIONS:  1. Administer medication as ordered  2. Teach and rehearse alternative coping skills  3. Provide emotional support with 1:1 interaction with staff  Outcome: Progressing  Flowsheets (Taken 1/21/2025 5224)  Will report anxiety at manageable levels:   Administer medication as ordered   Provide emotional support with 1:1 interaction with staff       Patient visible on unit, though isolative to self and to room at times. Medication and meal compliant. Calm and cooperative. No complaints at this time.

## 2025-01-21 NOTE — PLAN OF CARE
Problem: Anxiety  Goal: Will report anxiety at manageable levels  Description: INTERVENTIONS:  1. Administer medication as ordered  2. Teach and rehearse alternative coping skills  3. Provide emotional support with 1:1 interaction with staff  Outcome: Progressing  Flowsheets (Taken 1/20/2025 1464 by Maria Del Carmen Aguiar, RN)  Will report anxiety at manageable levels:   Administer medication as ordered   Teach and rehearse alternative coping skills   Provide emotional support with 1:1 interaction with staff     Problem: Sleep Disturbance  Goal: Will exhibit normal sleeping pattern  Description: INTERVENTIONS:  1. Administer medication as ordered  2. Decrease environmental stimuli, including noise, as appropriate  3. Discourage social isolation and naps during the day  Outcome: Progressing   Pt laying in bed with eyes closed, appears to be sleeping. Respirations even and unlabored, NAD. Safety measures in place. Q15 min safety checks continued.

## 2025-01-22 PROCEDURE — 6370000000 HC RX 637 (ALT 250 FOR IP): Performed by: NURSE PRACTITIONER

## 2025-01-22 PROCEDURE — 6370000000 HC RX 637 (ALT 250 FOR IP): Performed by: PSYCHIATRY & NEUROLOGY

## 2025-01-22 PROCEDURE — 1240000000 HC EMOTIONAL WELLNESS R&B

## 2025-01-22 RX ORDER — VENLAFAXINE 37.5 MG/1
150 TABLET ORAL DAILY
Status: DISCONTINUED | OUTPATIENT
Start: 2025-01-23 | End: 2025-02-03 | Stop reason: HOSPADM

## 2025-01-22 RX ORDER — VENLAFAXINE 37.5 MG/1
75 TABLET ORAL
Status: DISCONTINUED | OUTPATIENT
Start: 2025-01-23 | End: 2025-01-24

## 2025-01-22 RX ADMIN — CYANOCOBALAMIN TAB 500 MCG 500 MCG: 500 TAB at 09:26

## 2025-01-22 RX ADMIN — ACETAMINOPHEN 650 MG: 325 TABLET ORAL at 17:51

## 2025-01-22 RX ADMIN — Medication 1000 UNITS: at 09:26

## 2025-01-22 RX ADMIN — TRAZODONE HYDROCHLORIDE 50 MG: 50 TABLET ORAL at 20:17

## 2025-01-22 RX ADMIN — MIRTAZAPINE 15 MG: 15 TABLET, FILM COATED ORAL at 20:17

## 2025-01-22 RX ADMIN — THERA TABS 1 TABLET: TAB at 09:26

## 2025-01-22 RX ADMIN — VENLAFAXINE 75 MG: 37.5 TABLET ORAL at 09:26

## 2025-01-22 ASSESSMENT — PAIN DESCRIPTION - LOCATION
LOCATION: HEAD;TEETH
LOCATION: HEAD;TEETH

## 2025-01-22 ASSESSMENT — PAIN SCALES - GENERAL
PAINLEVEL_OUTOF10: 4
PAINLEVEL_OUTOF10: 7
PAINLEVEL_OUTOF10: 3

## 2025-01-22 ASSESSMENT — PAIN - FUNCTIONAL ASSESSMENT: PAIN_FUNCTIONAL_ASSESSMENT: ACTIVITIES ARE NOT PREVENTED

## 2025-01-22 NOTE — PLAN OF CARE
Problem: Safety - Adult  Goal: Free from fall injury  Outcome: Progressing   Pt laying in bed with eyes closed, appears to be sleeping. Respirations even and unlabored, NAD. Safety measures in place. Q15 min safety check continued.

## 2025-01-22 NOTE — INTERDISCIPLINARY ROUNDS
Behavioral Health Interdisciplinary Rounds     Patient Name: Elvia Natarajan  Age: 64 y.o.  Room/Bed:  [facility]  Primary Diagnosis: MDD (major depressive disorder), recurrent episode, mild (HCC)   Admission Status: Voluntary    Readmission within 30 days: No  Power of  in place: No  Patient requires a blocked bed: No          Reason for blocked bed: n/a  Sleep hours: 7+       Flu Vaccine: No  Participation in Care/Groups:  Yes  Medication Compliant?: Yes  PRNS (last 24 hours): None   Restraints (last 24 hours):  No  __________________________________________________  OQ Admission Analysis Survey completed:  OQ Admission Analysis Survey score:  __________________________________________________     Alcohol screening (AUDIT) completed -     Score:   Tobacco :  Illegal Drugs use:   CSSR Lifetime:     24 hour chart check complete: Yes    _______________________________________________    Patient goal(s) for today:   Treatment team focus/goals:   Progress note:     Spiritual Care Consult:   Financial concerns/prescription coverage:    Family contact:                        Family requesting physician contact today:    Discharge plan:   Access to weapons :                                                              Outpatient provider(s):     LOS:  5  Expected LOS:     Participating treatment team members: Elvia Natarajan, * (assigned SW),

## 2025-01-22 NOTE — PLAN OF CARE
1543: Pt isolative to room resting, alternately out for groups and meals. She needed prompting this am to get out of bed to start day, eat breakfast etc. She was using her CPAP in bed until that time. Pt is cooperative, polite, quiet, and withdrawn. Pt voices no c/o or concerns. Pt is demonstrating safe and appropriate behavior.   1905: pt is out of room more this evening, seated with peers at meal and watching TV. Pt brightens on approach. She took tylenol 650 mg for headache and toothache with good reported effect.     Problem: Depression  Goal: Will be euthymic at discharge  Description: INTERVENTIONS:  1. Administer medication as ordered  2. Provide emotional support via 1:1 interaction with staff  3. Encourage involvement in milieu/groups/activities  4. Monitor for social isolation  Outcome: Progressing     Problem: Anxiety  Goal: Will report anxiety at manageable levels  Description: INTERVENTIONS:  1. Administer medication as ordered  2. Teach and rehearse alternative coping skills  3. Provide emotional support with 1:1 interaction with staff  Outcome: Progressing    Problem: Sleep Disturbance  Goal: Will exhibit normal sleeping pattern  Description: INTERVENTIONS:  1. Administer medication as ordered  2. Decrease environmental stimuli, including noise, as appropriate  3. Discourage social isolation and naps during the day  Outcome: Progressing

## 2025-01-22 NOTE — PLAN OF CARE
Problem: Depression  Goal: Will be euthymic at discharge  Description: INTERVENTIONS:  1. Administer medication as ordered  2. Provide emotional support via 1:1 interaction with staff  3. Encourage involvement in milieu/groups/activities  4. Monitor for social isolation  Outcome: Progressing     Problem: Sleep Disturbance  Goal: Will exhibit normal sleeping pattern  Description: INTERVENTIONS:  1. Administer medication as ordered  2. Decrease environmental stimuli, including noise, as appropriate  3. Discourage social isolation and naps during the day  Outcome: Progressing       Pt received during treatment team. Affect flat, mood depressed. States she's not sure if she's feeling better but endorses sleeping well. Endorses PDW but denies SI/HI/AVH. States that her energy level is subdued, which is in line whenever she is depressed. States she is overwhelmed and has a fear of the future. Endorses some edema in her ankles, which she states is not normal. Staff will continue to provide encouragement and monitor for safety.

## 2025-01-23 LAB
ALBUMIN SERPL-MCNC: 2.3 G/DL (ref 3.5–5)
ALBUMIN/GLOB SERPL: 1.2 (ref 1.1–2.2)
ALP SERPL-CCNC: 99 U/L (ref 45–117)
ALT SERPL-CCNC: 109 U/L (ref 12–78)
ANION GAP SERPL CALC-SCNC: 2 MMOL/L (ref 2–12)
AST SERPL-CCNC: 65 U/L (ref 15–37)
BILIRUB SERPL-MCNC: 0.5 MG/DL (ref 0.2–1)
BUN SERPL-MCNC: 10 MG/DL (ref 6–20)
BUN/CREAT SERPL: 17 (ref 12–20)
CALCIUM SERPL-MCNC: 8.2 MG/DL (ref 8.5–10.1)
CHLORIDE SERPL-SCNC: 111 MMOL/L (ref 97–108)
CO2 SERPL-SCNC: 30 MMOL/L (ref 21–32)
CREAT SERPL-MCNC: 0.58 MG/DL (ref 0.55–1.02)
GLOBULIN SER CALC-MCNC: 1.9 G/DL (ref 2–4)
GLUCOSE SERPL-MCNC: 76 MG/DL (ref 65–100)
POTASSIUM SERPL-SCNC: 4.1 MMOL/L (ref 3.5–5.1)
PROT SERPL-MCNC: 4.2 G/DL (ref 6.4–8.2)
SODIUM SERPL-SCNC: 143 MMOL/L (ref 136–145)

## 2025-01-23 PROCEDURE — 84425 ASSAY OF VITAMIN B-1: CPT

## 2025-01-23 PROCEDURE — 36415 COLL VENOUS BLD VENIPUNCTURE: CPT

## 2025-01-23 PROCEDURE — 1240000000 HC EMOTIONAL WELLNESS R&B

## 2025-01-23 PROCEDURE — 6370000000 HC RX 637 (ALT 250 FOR IP): Performed by: PSYCHIATRY & NEUROLOGY

## 2025-01-23 PROCEDURE — 80053 COMPREHEN METABOLIC PANEL: CPT

## 2025-01-23 RX ORDER — CHOLESTYRAMINE LIGHT 4 G/5.7G
4 POWDER, FOR SUSPENSION ORAL DAILY
Status: DISCONTINUED | OUTPATIENT
Start: 2025-01-24 | End: 2025-01-27

## 2025-01-23 RX ORDER — MIRTAZAPINE 15 MG/1
30 TABLET, FILM COATED ORAL NIGHTLY
Status: DISCONTINUED | OUTPATIENT
Start: 2025-01-23 | End: 2025-01-28

## 2025-01-23 RX ADMIN — CYANOCOBALAMIN TAB 500 MCG 500 MCG: 500 TAB at 09:39

## 2025-01-23 RX ADMIN — Medication 1000 UNITS: at 09:38

## 2025-01-23 RX ADMIN — VENLAFAXINE 75 MG: 37.5 TABLET ORAL at 12:46

## 2025-01-23 RX ADMIN — VENLAFAXINE 150 MG: 37.5 TABLET ORAL at 09:38

## 2025-01-23 RX ADMIN — THERA TABS 1 TABLET: TAB at 09:39

## 2025-01-23 RX ADMIN — TRAZODONE HYDROCHLORIDE 50 MG: 50 TABLET ORAL at 20:54

## 2025-01-23 RX ADMIN — MIRTAZAPINE 30 MG: 15 TABLET, FILM COATED ORAL at 20:53

## 2025-01-23 NOTE — PLAN OF CARE
1049: States she is feeling subdued. She said she sleeps fine and needs a lot of sleep. Discussed attending a PHP on discharge and she said she is open to considering. Her lab improvements discussed. She reports a month history of morales-colored stools and some bowel incontinence. GI consult order in.      Problem: Depression  Goal: Will be euthymic at discharge  Description: INTERVENTIONS:  1. Administer medication as ordered  2. Provide emotional support via 1:1 interaction with staff  3. Encourage involvement in milieu/groups/activities  4. Monitor for social isolation  Outcome: Progressing     Problem: Anxiety  Goal: Will report anxiety at manageable levels  Description: INTERVENTIONS:  1. Administer medication as ordered  2. Teach and rehearse alternative coping skills  3. Provide emotional support with 1:1 interaction with staff  1/23/2025 0756 by Traci Guardado RN  Outcome: Progressing  1/23/2025 0016 by Lyly Way LPN  Outcome: Progressing

## 2025-01-23 NOTE — CONSULTS
[facility]                         GASTROENTEROLOGY CONSULTATION NOTE              NAME:  Elvia Natarajan   :   1960   MRN:   [id number]       Referring Physician:    Dr. Bermeo      Consult Date:   2025 3:54 PM    Chief Complaint:    depression     History of Present Illness:    Patient is a 64 y.o. with hx of duodenal switch surgery, arthritis, charli, depression admitted for depression.   GI asked to see for stool changes and elevated liver enzymes.   She notes 3-4 mo hx of orange colored, oily stools. Notes 2-3 bowel movements per day. She has had a few episodes of fecal incontinence. Denies abd pain, hematochezia, melena, weight loss. She did complete pelvic floor PT earlier this year which helped with incontinence episodes.     Also with elevated LFTs. Upon admission tbili 0.5, ast 170, lt 224, alk phos 162.   Today tbili 0.5, ast 65, alt 109, alk phos 99.   She has a hx of elevated LFTs and underwent evaluation in  with negative viral hepatitis panel. It was attributed to lamictal at that time however she has been off this medication for several years.     She denies alcohol use or other illegal drug use. Reports home medication pristiq. Denies statin use.      PMH:  Past Medical History:   Diagnosis Date    Arthritis     OSTEO    Nausea & vomiting     CHARLI (obstructive sleep apnea)     on CPAP for last 3 y ears    Psychiatric disorder     DEPRESSION    Sleep apnea     uses c pap       PSH:  Past Surgical History:   Procedure Laterality Date    GI      COLONOSCOPY MULTIPLE WITH REMOVAL OF BENIGN POLYPS    GYN      LT OOPHORECTOMY    ORTHOPEDIC SURGERY      LLT MENISCUS CONDROPLASTY    ORTHOPEDIC SURGERY      LT ROTATOR CUFF 2 TIMES RT ROTATOR 1 TIME    ORTHOPEDIC SURGERY      BILATERAL KNEE REPLACEMENTS    OVARY REMOVAL      Left ovary removed.    [place] UNLISTED PROCEDURE ABDOMEN PERITONEUM & OMENTUM  2018    ABDOMINAL - DUODENAL SWITCH, WITH CHOLECYSTECTOMY     SHOULDER

## 2025-01-23 NOTE — INTERDISCIPLINARY ROUNDS
Behavioral Health Interdisciplinary Rounds     Patient Name: Elvia Natarajan  Age: 64 y.o.  Room/Bed:  [facility]/  Primary Diagnosis: MDD (major depressive disorder), recurrent episode, mild (HCC)   Admission Status: Voluntary    Readmission within 30 days: No  Power of  in place: No  Patient requires a blocked bed: No          Reason for blocked bed: n/a  Sleep hours: 7+       Flu Vaccine: No  Participation in Care/Groups:  Yes  Medication Compliant?: Yes  PRNS (last 24 hours): tylenol, trazodone   Restraints (last 24 hours):  No  __________________________________________________    24 hour chart check complete: Yes    _______________________________________________    Patient goal(s) for today: meet with treatment team   Treatment team focus/goals: Plan to continue to titrate her medications   Progress note: She denies SI , but still feels very hopeless with a passive death wish - she continues to be concerned about GI issues - plan for a GI consult     Spiritual Care Consult:   Financial concerns/prescription coverage:  medicaid   Family contact:    she has not signed a MADHAV                     Family requesting physician contact today:    Discharge plan: she will return home   Access to weapons : no                                                             Outpatient provider(s): [facility]     LOS:  6  Expected LOS: Monday     Participating treatment team members: Elvia Natarajan, ARIES Pugh Dr. RN

## 2025-01-23 NOTE — CONSULTS
Comprehensive Nutrition Assessment    Type and Reason for Visit: Initial, Consult    Nutrition Recommendations/Plan:   Continue Adult Regular diet; add double protein portions   Start Standard High Calorie, High Protein ONS BID   Consult GI    Adjust vitamin/mineral regimen to:   - Chewable Flintstones MVI BID   - 500mcg Vit B12   - 600mg Calcium Carbonate BID    - 3000IU Vit D        Malnutrition Assessment:  Malnutrition Status:  Moderate malnutrition (01/23/25 5499)    Context:  Chronic Illness     Findings of the 6 clinical characteristics of malnutrition:  Energy Intake:  75% or less estimated energy requirements for 1 month or longer  Weight Loss:  No weight loss     Body Fat Loss:  Mild body fat loss Triceps   Muscle Mass Loss:  Mild muscle mass loss Clavicles (pectoralis & deltoids), Temples (temporalis), Hand (interosseous)  Fluid Accumulation:  Unable to assess     Strength:  Not Performed       Nutrition Assessment:    64 yr old Female with PMH osteoarthritis, YULIANA on CPAP, depression, 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 Admitted to [facility] for MDD, recurrent episode.     RD consulted for diet education related to duodenal switch and food intolerance.   RD spoke to Pt today. Pt has had limited routine bariatric follow up care. She reports she was taking numerous vitamins and minerals daily until stopping about 2 months ago. Pt has been having orange, morales-like, oily stools daily for the past 1-2 months. She reports she focuses on eating protein and vegetables and appetite/intakes have been stable. Reported UBW ~120-130 and denies any recent weight loss.     Since admission pt has been eating 100% of all three meals and snacks provided since admission.     Based on chart review, Pt was seen by DIMA Ortiz on 10/2/23. At that time pt was tolerating around 60 oz fluids and 120 - 150 g protein daily. Pt states that at the time of her procedure, she was told that 120-150g protein should

## 2025-01-23 NOTE — PLAN OF CARE
Problem: Anxiety  Goal: Will report anxiety at manageable levels  Description: INTERVENTIONS:  1. Administer medication as ordered  2. Teach and rehearse alternative coping skills  3. Provide emotional support with 1:1 interaction with staff  1/23/2025 0016 by Lyly Way LPN  Outcome: Progressing     Problem: Safety - Adult  Goal: Free from fall injury  1/23/2025 0016 by Lyly Way LPN  Outcome: Progressing   Pt laying in bed with eyes closed, appears to be sleeping. Respirations even and unlabored, NAD. Safety measures in place. Q15 min safety checks continued.

## 2025-01-24 ENCOUNTER — APPOINTMENT (OUTPATIENT)
Facility: HOSPITAL | Age: 65
DRG: 751 | End: 2025-01-24
Payer: MEDICAID

## 2025-01-24 PROBLEM — E44.0 MODERATE PROTEIN-ENERGY MALNUTRITION (HCC): Status: ACTIVE | Noted: 2025-01-24

## 2025-01-24 LAB
COMMENT:: NORMAL
EKG ATRIAL RATE: 60 BPM
EKG DIAGNOSIS: NORMAL
EKG P AXIS: 52 DEGREES
EKG P-R INTERVAL: 132 MS
EKG Q-T INTERVAL: 376 MS
EKG QRS DURATION: 80 MS
EKG QTC CALCULATION (BAZETT): 376 MS
EKG R AXIS: 15 DEGREES
EKG T AXIS: 26 DEGREES
EKG VENTRICULAR RATE: 60 BPM
FERRITIN SERPL-MCNC: 265 NG/ML (ref 26–388)
IGA SERPL-MCNC: 317 MG/DL (ref 70–400)
IGG SERPL-MCNC: 595 MG/DL (ref 700–1600)
IGM SERPL-MCNC: 33 MG/DL (ref 40–230)
IRON SATN MFR SERPL: 57 % (ref 20–50)
IRON SERPL-MCNC: 93 UG/DL (ref 35–150)
SPECIMEN HOLD: NORMAL
T4 FREE SERPL-MCNC: 0.8 NG/DL (ref 0.8–1.5)
TIBC SERPL-MCNC: 162 UG/DL (ref 250–450)
TSH SERPL DL<=0.05 MIU/L-ACNC: 0.56 UIU/ML (ref 0.36–3.74)

## 2025-01-24 PROCEDURE — 76705 ECHO EXAM OF ABDOMEN: CPT

## 2025-01-24 PROCEDURE — 82390 ASSAY OF CERULOPLASMIN: CPT

## 2025-01-24 PROCEDURE — 83550 IRON BINDING TEST: CPT

## 2025-01-24 PROCEDURE — 93005 ELECTROCARDIOGRAM TRACING: CPT | Performed by: PSYCHIATRY & NEUROLOGY

## 2025-01-24 PROCEDURE — 83540 ASSAY OF IRON: CPT

## 2025-01-24 PROCEDURE — 86015 ACTIN ANTIBODY EACH: CPT

## 2025-01-24 PROCEDURE — 82728 ASSAY OF FERRITIN: CPT

## 2025-01-24 PROCEDURE — 6370000000 HC RX 637 (ALT 250 FOR IP): Performed by: FAMILY MEDICINE

## 2025-01-24 PROCEDURE — 86364 TISS TRNSGLTMNASE EA IG CLAS: CPT

## 2025-01-24 PROCEDURE — 87340 HEPATITIS B SURFACE AG IA: CPT

## 2025-01-24 PROCEDURE — 82653 EL-1 FECAL QUANTITATIVE: CPT

## 2025-01-24 PROCEDURE — 86706 HEP B SURFACE ANTIBODY: CPT

## 2025-01-24 PROCEDURE — 93010 ELECTROCARDIOGRAM REPORT: CPT | Performed by: SPECIALIST

## 2025-01-24 PROCEDURE — 84439 ASSAY OF FREE THYROXINE: CPT

## 2025-01-24 PROCEDURE — 84443 ASSAY THYROID STIM HORMONE: CPT

## 2025-01-24 PROCEDURE — 1240000000 HC EMOTIONAL WELLNESS R&B

## 2025-01-24 PROCEDURE — 86704 HEP B CORE ANTIBODY TOTAL: CPT

## 2025-01-24 PROCEDURE — 82784 ASSAY IGA/IGD/IGG/IGM EACH: CPT

## 2025-01-24 PROCEDURE — 86803 HEPATITIS C AB TEST: CPT

## 2025-01-24 PROCEDURE — 86381 MITOCHONDRIAL ANTIBODY EACH: CPT

## 2025-01-24 PROCEDURE — 36415 COLL VENOUS BLD VENIPUNCTURE: CPT

## 2025-01-24 PROCEDURE — 86038 ANTINUCLEAR ANTIBODIES: CPT

## 2025-01-24 PROCEDURE — 6370000000 HC RX 637 (ALT 250 FOR IP): Performed by: PSYCHIATRY & NEUROLOGY

## 2025-01-24 PROCEDURE — 82103 ALPHA-1-ANTITRYPSIN TOTAL: CPT

## 2025-01-24 RX ORDER — VENLAFAXINE 37.5 MG/1
150 TABLET ORAL
Status: DISCONTINUED | OUTPATIENT
Start: 2025-01-24 | End: 2025-02-03 | Stop reason: HOSPADM

## 2025-01-24 RX ADMIN — CYANOCOBALAMIN TAB 500 MCG 500 MCG: 500 TAB at 08:44

## 2025-01-24 RX ADMIN — CHOLESTYRAMINE 4 G: 4 POWDER, FOR SUSPENSION ORAL at 08:44

## 2025-01-24 RX ADMIN — VENLAFAXINE 150 MG: 37.5 TABLET ORAL at 12:31

## 2025-01-24 RX ADMIN — MIRTAZAPINE 30 MG: 15 TABLET, FILM COATED ORAL at 20:38

## 2025-01-24 RX ADMIN — TRAZODONE HYDROCHLORIDE 50 MG: 50 TABLET ORAL at 20:38

## 2025-01-24 RX ADMIN — THERA TABS 1 TABLET: TAB at 08:44

## 2025-01-24 RX ADMIN — Medication 1000 UNITS: at 08:44

## 2025-01-24 RX ADMIN — VENLAFAXINE 150 MG: 37.5 TABLET ORAL at 08:44

## 2025-01-24 NOTE — INTERDISCIPLINARY ROUNDS
Behavioral Health Interdisciplinary Rounds     Patient Name: Elvia Natarajan  Age: 64 y.o.  Room/Bed:  [facility]/  Primary Diagnosis: MDD (major depressive disorder), recurrent episode, mild (HCC)   Admission Status: Voluntary     Readmission within 30 days:   Power of  in place:   Patient requires a blocked bed: No          Reason for blocked bed: N/A  Sleep hours:   Flu vaccine : not received this season      Participation in Care/Groups:  Yes  Medication Compliant?:  Medication Compliant: Yes  PRNS (last 24 hours):  PRNS: Sleep Aid    Restraints (last 24 hours):  NO  __________________________________________________  OQ Admission Analysis Survey completed:  OQ Admission Analysis Survey score:    __________________________________________________     Alcohol screening (AUDIT) completed -     Score:   Tobacco :  Illegal Drugs use:   CSSR Lifetime:     24 hour chart check complete: Yes    _______________________________________________    Patient goal(s) for today: Communicate needs to staff   Treatment team focus/goals: Assess pt, manage medications, discharge planning   Progress note: Pt is eating and sleeping better. Pt is hydrating. Pt has been out on unit and engaged in groups. Pt continues to endorse passive death wish. Pt also reports feeling hopelessness      Spiritual Care Consult:   Financial concerns/prescription coverage:    Family contact:                        Family requesting physician contact today:    Discharge plan: Pt to discharge home   Access to weapons : no                                                              Outpatient provider(s):     LOS:  7  Expected LOS: TBD     Participating treatment team members: Stormy Gallagher, MSW, Rere CASAS, RN, Radha FRANZ, PHARMD, Dr. Bermeo

## 2025-01-24 NOTE — PLAN OF CARE
Problem: Depression  Goal: Will be euthymic at discharge  Description: INTERVENTIONS:  1. Administer medication as ordered  2. Provide emotional support via 1:1 interaction with staff  3. Encourage involvement in milieu/groups/activities  4. Monitor for social isolation  Outcome: Progressing  Note: Passively engaged, frequently resting in bed however compliant with participation when prompted. Continues to report feeling depressed, tired and impaired memory. Able to get her needs met on unit however very hesitant to engage and does seek reassurance at times regarding what is expected of her and unit routine. No suicidal plan however does continues to share hopelessness. Staff will continue to offer support and reassurance.

## 2025-01-24 NOTE — PLAN OF CARE
Pt is quietly resting in bed with eyes closed, appears to be asleep. Respirations are even and unlabored, NAD. Safety measures are in place and Q 15 minute rounds done.   Problem: Sleep Disturbance  Goal: Will exhibit normal sleeping pattern  Description: INTERVENTIONS:  1. Administer medication as ordered  2. Decrease environmental stimuli, including noise, as appropriate  3. Discourage social isolation and naps during the day  Outcome: Progressing

## 2025-01-25 LAB — ANA SER QL: NEGATIVE

## 2025-01-25 PROCEDURE — 6370000000 HC RX 637 (ALT 250 FOR IP): Performed by: FAMILY MEDICINE

## 2025-01-25 PROCEDURE — 6370000000 HC RX 637 (ALT 250 FOR IP): Performed by: PSYCHIATRY & NEUROLOGY

## 2025-01-25 PROCEDURE — 1240000000 HC EMOTIONAL WELLNESS R&B

## 2025-01-25 RX ADMIN — VENLAFAXINE 150 MG: 37.5 TABLET ORAL at 08:44

## 2025-01-25 RX ADMIN — CYANOCOBALAMIN TAB 500 MCG 500 MCG: 500 TAB at 08:44

## 2025-01-25 RX ADMIN — MIRTAZAPINE 30 MG: 15 TABLET, FILM COATED ORAL at 21:02

## 2025-01-25 RX ADMIN — THERA TABS 1 TABLET: TAB at 08:44

## 2025-01-25 RX ADMIN — TRAZODONE HYDROCHLORIDE 50 MG: 50 TABLET ORAL at 21:03

## 2025-01-25 RX ADMIN — Medication 1000 UNITS: at 08:44

## 2025-01-25 RX ADMIN — CHOLESTYRAMINE 4 G: 4 POWDER, FOR SUSPENSION ORAL at 08:44

## 2025-01-25 RX ADMIN — VENLAFAXINE 150 MG: 37.5 TABLET ORAL at 12:27

## 2025-01-25 NOTE — PLAN OF CARE
Pt is resting in bed with eyes closed, appears to be asleep. Respirations are even and unlabored, NAD. Safety measures are in place and Q 15 rounds are done.   Problem: Sleep Disturbance  Goal: Will exhibit normal sleeping pattern  Description: INTERVENTIONS:  1. Administer medication as ordered  2. Decrease environmental stimuli, including noise, as appropriate  3. Discourage social isolation and naps during the day  Outcome: Progressing     Problem: Safety - Adult  Goal: Free from fall injury  Outcome: Progressing

## 2025-01-25 NOTE — PLAN OF CARE
Problem: Discharge Planning  Goal: Discharge to home or other facility with appropriate resources  Outcome: Progressing  Flowsheets (Taken 1/25/2025 0753 by Violet Philip, RN)  Discharge to home or other facility with appropriate resources: Identify discharge learning needs (meds, wound care, etc)     Problem: Depression  Goal: Will be euthymic at discharge  Description: INTERVENTIONS:  1. Administer medication as ordered  2. Provide emotional support via 1:1 interaction with staff  3. Encourage involvement in milieu/groups/activities  4. Monitor for social isolation  1/25/2025 1538 by Traci Guardado, RN  Outcome: Progressing  1/25/2025 1140 by Violet Philip, RN  Outcome: Progressing  Note: Pt participated in treatment team. Out on the unit for small periods of time. Reviewed medications during treatment team. Discussed pt taking a shower today. Eating meals and taking scheduled medications. Mood has improved slightly. Encouraged pt to participate on the unit.

## 2025-01-25 NOTE — PLAN OF CARE
Problem: Depression  Goal: Will be euthymic at discharge  Description: INTERVENTIONS:  1. Administer medication as ordered  2. Provide emotional support via 1:1 interaction with staff  3. Encourage involvement in milieu/groups/activities  4. Monitor for social isolation  Outcome: Progressing  Note: Pt participated in treatment team. Out on the unit for small periods of time. Reviewed medications during treatment team. Discussed pt taking a shower today. Eating meals and taking scheduled medications. Mood has improved slightly. Encouraged pt to participate on the unit.

## 2025-01-26 LAB
A1AT SERPL-MCNC: 87 MG/DL (ref 101–187)
CERULOPLASMIN SERPL-MCNC: 8.6 MG/DL (ref 19–39)
IGA SERPL-MCNC: 310 MG/DL (ref 87–352)
TTG IGA SER-ACNC: <2 U/ML (ref 0–3)

## 2025-01-26 PROCEDURE — 6370000000 HC RX 637 (ALT 250 FOR IP): Performed by: PSYCHIATRY & NEUROLOGY

## 2025-01-26 PROCEDURE — 6370000000 HC RX 637 (ALT 250 FOR IP): Performed by: FAMILY MEDICINE

## 2025-01-26 PROCEDURE — 1240000000 HC EMOTIONAL WELLNESS R&B

## 2025-01-26 PROCEDURE — 6370000000 HC RX 637 (ALT 250 FOR IP): Performed by: NURSE PRACTITIONER

## 2025-01-26 RX ORDER — ARIPIPRAZOLE 2 MG/1
2 TABLET ORAL DAILY
Status: DISCONTINUED | OUTPATIENT
Start: 2025-01-26 | End: 2025-01-27

## 2025-01-26 RX ORDER — LANOLIN ALCOHOL/MO/W.PET/CERES
100 CREAM (GRAM) TOPICAL DAILY
Status: DISCONTINUED | OUTPATIENT
Start: 2025-01-26 | End: 2025-02-03 | Stop reason: HOSPADM

## 2025-01-26 RX ADMIN — ARIPIPRAZOLE 2 MG: 2 TABLET ORAL at 12:04

## 2025-01-26 RX ADMIN — ALUMINUM HYDROXIDE, MAGNESIUM HYDROXIDE, AND SIMETHICONE 30 ML: 1200; 120; 1200 SUSPENSION ORAL at 22:41

## 2025-01-26 RX ADMIN — THERA TABS 1 TABLET: TAB at 09:52

## 2025-01-26 RX ADMIN — MIRTAZAPINE 30 MG: 15 TABLET, FILM COATED ORAL at 20:31

## 2025-01-26 RX ADMIN — VENLAFAXINE 150 MG: 37.5 TABLET ORAL at 09:52

## 2025-01-26 RX ADMIN — Medication 1000 UNITS: at 09:52

## 2025-01-26 RX ADMIN — CHOLESTYRAMINE 4 G: 4 POWDER, FOR SUSPENSION ORAL at 09:52

## 2025-01-26 RX ADMIN — Medication 100 MG: at 12:04

## 2025-01-26 RX ADMIN — CYANOCOBALAMIN TAB 500 MCG 500 MCG: 500 TAB at 09:52

## 2025-01-26 RX ADMIN — VENLAFAXINE 150 MG: 37.5 TABLET ORAL at 12:57

## 2025-01-26 ASSESSMENT — PAIN SCALES - GENERAL: PAINLEVEL_OUTOF10: 0

## 2025-01-26 ASSESSMENT — PAIN - FUNCTIONAL ASSESSMENT: PAIN_FUNCTIONAL_ASSESSMENT: NONE - DENIES PAIN

## 2025-01-26 NOTE — PLAN OF CARE
Problem: Depression  Goal: Will be euthymic at discharge  Description: INTERVENTIONS:  1. Administer medication as ordered  2. Provide emotional support via 1:1 interaction with staff  3. Encourage involvement in milieu/groups/activities  4. Monitor for social isolation  Outcome: Progressing  Note: Remains withdrawn and isolative. Describe hopelessness with a lack of motivation and energy. Continues to struggle to stay out of bed and complete daily hygiene. Daily goal is to stay out of bed this am. Staff focus is on offering support

## 2025-01-26 NOTE — PLAN OF CARE
Problem: Sleep Disturbance  Goal: Will exhibit normal sleeping pattern  Description: INTERVENTIONS:  1. Administer medication as ordered  2. Decrease environmental stimuli, including noise, as appropriate  3. Discourage social isolation and naps during the day  Outcome: Progressing     Pt received resting in bed with eyes closed. Breathing is even and unlabored. Walkways are free and clear from clutter.

## 2025-01-27 LAB
ELASTASE PANC STL-MCNT: 172 UG ELAST./G
HBV CORE AB SERPL QL IA: NEGATIVE
HBV SURFACE AB SER QL: NON REACTIVE INDEX VALUE
HBV SURFACE AG SERPL QL IA: NEGATIVE
HCV AB SERPL QL IA: NORMAL
HCV IGG SERPL QL IA: NON REACTIVE S/CO RATIO
MITOCHONDRIA M2 IGG SER-ACNC: <20 UNITS (ref 0–20)
SMA IGG SER-ACNC: 3 UNITS (ref 0–19)
VIT B1 BLD-SCNC: 137.4 NMOL/L (ref 66.5–200)

## 2025-01-27 PROCEDURE — 6370000000 HC RX 637 (ALT 250 FOR IP): Performed by: NURSE PRACTITIONER

## 2025-01-27 PROCEDURE — 1240000000 HC EMOTIONAL WELLNESS R&B

## 2025-01-27 PROCEDURE — 82525 ASSAY OF COPPER: CPT

## 2025-01-27 PROCEDURE — 6370000000 HC RX 637 (ALT 250 FOR IP): Performed by: PSYCHIATRY & NEUROLOGY

## 2025-01-27 PROCEDURE — 6370000000 HC RX 637 (ALT 250 FOR IP): Performed by: FAMILY MEDICINE

## 2025-01-27 RX ORDER — ARIPIPRAZOLE 5 MG/1
5 TABLET ORAL DAILY
Status: DISCONTINUED | OUTPATIENT
Start: 2025-01-28 | End: 2025-02-03 | Stop reason: HOSPADM

## 2025-01-27 RX ORDER — ARIPIPRAZOLE 2 MG/1
3 TABLET ORAL ONCE
Status: COMPLETED | OUTPATIENT
Start: 2025-01-27 | End: 2025-01-27

## 2025-01-27 RX ADMIN — ARIPIPRAZOLE 2 MG: 2 TABLET ORAL at 08:41

## 2025-01-27 RX ADMIN — CYANOCOBALAMIN TAB 500 MCG 500 MCG: 500 TAB at 08:41

## 2025-01-27 RX ADMIN — VENLAFAXINE 150 MG: 37.5 TABLET ORAL at 08:41

## 2025-01-27 RX ADMIN — ARIPIPRAZOLE 3 MG: 2 TABLET ORAL at 12:07

## 2025-01-27 RX ADMIN — Medication 100 MG: at 08:41

## 2025-01-27 RX ADMIN — Medication 1000 UNITS: at 08:41

## 2025-01-27 RX ADMIN — MIRTAZAPINE 30 MG: 15 TABLET, FILM COATED ORAL at 20:37

## 2025-01-27 RX ADMIN — VENLAFAXINE 150 MG: 37.5 TABLET ORAL at 12:07

## 2025-01-27 RX ADMIN — THERA TABS 1 TABLET: TAB at 08:41

## 2025-01-27 RX ADMIN — ACETAMINOPHEN 650 MG: 325 TABLET ORAL at 20:38

## 2025-01-27 RX ADMIN — TRAZODONE HYDROCHLORIDE 50 MG: 50 TABLET ORAL at 20:39

## 2025-01-27 RX ADMIN — PANCRELIPASE LIPASE, PANCRELIPASE PROTEASE, PANCRELIPASE AMYLASE 20000 UNITS: 20000; 63000; 84000 CAPSULE, DELAYED RELEASE ORAL at 17:08

## 2025-01-27 RX ADMIN — CHOLESTYRAMINE 4 G: 4 POWDER, FOR SUSPENSION ORAL at 08:41

## 2025-01-27 ASSESSMENT — PAIN SCALES - GENERAL: PAINLEVEL_OUTOF10: 7

## 2025-01-27 ASSESSMENT — PAIN DESCRIPTION - LOCATION: LOCATION: HEAD

## 2025-01-27 ASSESSMENT — PAIN DESCRIPTION - DESCRIPTORS: DESCRIPTORS: ACHING

## 2025-01-27 ASSESSMENT — PAIN - FUNCTIONAL ASSESSMENT: PAIN_FUNCTIONAL_ASSESSMENT: ACTIVITIES ARE NOT PREVENTED

## 2025-01-27 ASSESSMENT — PAIN DESCRIPTION - ORIENTATION: ORIENTATION: ANTERIOR

## 2025-01-27 NOTE — PLAN OF CARE
Problem: Depression  Goal: Will be euthymic at discharge  Description: INTERVENTIONS:  1. Administer medication as ordered  2. Provide emotional support via 1:1 interaction with staff  3. Encourage involvement in milieu/groups/activities  4. Monitor for social isolation  Outcome: Progressing  Note: Out on unit passively engaged. Frequently returning to bed. Increase participation when prompted and engaged. Continues to voice hopelessness and lack of motivation and energy. Verbalized understanding of medication adjustments and ongoing GI consult. Staff focus is on offering support

## 2025-01-27 NOTE — PLAN OF CARE
Problem: Safety - Adult  Goal: Free from fall injury  Outcome: Progressing   Patient resting quietly in bed. No signs of distress. Even and unlabored breathing. Staff stationed at doorway for monitoring while CPAP machine is in use.

## 2025-01-27 NOTE — PLAN OF CARE
Problem: Depression  Goal: Will be euthymic at discharge  Description: INTERVENTIONS:  1. Administer medication as ordered  2. Provide emotional support via 1:1 interaction with staff  3. Encourage involvement in milieu/groups/activities  4. Monitor for social isolation  1/27/2025 1552 by Latasha Casey, RN  Outcome: Progressing     Problem: Anxiety  Goal: Will report anxiety at manageable levels  Description: INTERVENTIONS:  1. Administer medication as ordered  2. Teach and rehearse alternative coping skills  3. Provide emotional support with 1:1 interaction with staff  Outcome: Progressing     Problem: Safety - Adult  Goal: Free from fall injury  Outcome: Progressing   Pt out on unit engaged with peers. Denies current SI/HI/AVH. Remains medication and meal compliant. Will continue to monitor q15 minutes for safety.

## 2025-01-28 LAB
ALBUMIN SERPL-MCNC: 2.2 G/DL (ref 3.5–5)
ALBUMIN/GLOB SERPL: 1.1 (ref 1.1–2.2)
ALP SERPL-CCNC: 100 U/L (ref 45–117)
ALT SERPL-CCNC: 70 U/L (ref 12–78)
AST SERPL-CCNC: 29 U/L (ref 15–37)
BILIRUB DIRECT SERPL-MCNC: 0.2 MG/DL (ref 0–0.2)
BILIRUB SERPL-MCNC: 0.4 MG/DL (ref 0.2–1)
CHOLEST SERPL-MCNC: 96 MG/DL
EST. AVERAGE GLUCOSE BLD GHB EST-MCNC: ABNORMAL MG/DL
GLOBULIN SER CALC-MCNC: 2 G/DL (ref 2–4)
GLUCOSE BLD STRIP.AUTO-MCNC: 90 MG/DL (ref 65–117)
HBA1C MFR BLD: <3.8 % (ref 4–5.6)
HDLC SERPL-MCNC: 53 MG/DL
HDLC SERPL: 1.8 (ref 0–5)
LDLC SERPL CALC-MCNC: 29 MG/DL (ref 0–100)
PROT SERPL-MCNC: 4.2 G/DL (ref 6.4–8.2)
SERVICE CMNT-IMP: NORMAL
TRIGL SERPL-MCNC: 70 MG/DL
VLDLC SERPL CALC-MCNC: 14 MG/DL

## 2025-01-28 PROCEDURE — 80061 LIPID PANEL: CPT

## 2025-01-28 PROCEDURE — 82962 GLUCOSE BLOOD TEST: CPT

## 2025-01-28 PROCEDURE — 82103 ALPHA-1-ANTITRYPSIN TOTAL: CPT

## 2025-01-28 PROCEDURE — 6370000000 HC RX 637 (ALT 250 FOR IP): Performed by: PSYCHIATRY & NEUROLOGY

## 2025-01-28 PROCEDURE — 82104 ALPHA-1-ANTITRYPSIN PHENO: CPT

## 2025-01-28 PROCEDURE — 1240000000 HC EMOTIONAL WELLNESS R&B

## 2025-01-28 PROCEDURE — 36415 COLL VENOUS BLD VENIPUNCTURE: CPT

## 2025-01-28 PROCEDURE — 6370000000 HC RX 637 (ALT 250 FOR IP): Performed by: FAMILY MEDICINE

## 2025-01-28 PROCEDURE — 80076 HEPATIC FUNCTION PANEL: CPT

## 2025-01-28 PROCEDURE — 83036 HEMOGLOBIN GLYCOSYLATED A1C: CPT

## 2025-01-28 RX ORDER — MIRTAZAPINE 15 MG/1
45 TABLET, FILM COATED ORAL NIGHTLY
Status: DISCONTINUED | OUTPATIENT
Start: 2025-01-28 | End: 2025-02-03 | Stop reason: HOSPADM

## 2025-01-28 RX ADMIN — VENLAFAXINE 150 MG: 37.5 TABLET ORAL at 08:25

## 2025-01-28 RX ADMIN — MIRTAZAPINE 45 MG: 15 TABLET, FILM COATED ORAL at 20:28

## 2025-01-28 RX ADMIN — Medication 1000 UNITS: at 08:24

## 2025-01-28 RX ADMIN — VENLAFAXINE 150 MG: 37.5 TABLET ORAL at 12:42

## 2025-01-28 RX ADMIN — PANCRELIPASE LIPASE, PANCRELIPASE PROTEASE, PANCRELIPASE AMYLASE 20000 UNITS: 20000; 63000; 84000 CAPSULE, DELAYED RELEASE ORAL at 08:24

## 2025-01-28 RX ADMIN — PANCRELIPASE LIPASE, PANCRELIPASE PROTEASE, PANCRELIPASE AMYLASE 20000 UNITS: 20000; 63000; 84000 CAPSULE, DELAYED RELEASE ORAL at 17:15

## 2025-01-28 RX ADMIN — TRAZODONE HYDROCHLORIDE 50 MG: 50 TABLET ORAL at 20:28

## 2025-01-28 RX ADMIN — Medication 100 MG: at 08:24

## 2025-01-28 RX ADMIN — CYANOCOBALAMIN TAB 500 MCG 500 MCG: 500 TAB at 08:24

## 2025-01-28 RX ADMIN — THERA TABS 1 TABLET: TAB at 08:25

## 2025-01-28 RX ADMIN — PANCRELIPASE LIPASE, PANCRELIPASE PROTEASE, PANCRELIPASE AMYLASE 20000 UNITS: 20000; 63000; 84000 CAPSULE, DELAYED RELEASE ORAL at 12:41

## 2025-01-28 RX ADMIN — ARIPIPRAZOLE 5 MG: 5 TABLET ORAL at 08:25

## 2025-01-28 NOTE — PLAN OF CARE
Pt is resting quietly in bed with eyes closed, appears asleep. Respirations are even and unlabored, NAD. Safety measures are in place and Q 15 minute rounds are in place.   Problem: Anxiety  Goal: Will report anxiety at manageable levels  Description: INTERVENTIONS:  1. Administer medication as ordered  2. Teach and rehearse alternative coping skills  3. Provide emotional support with 1:1 interaction with staff  1/27/2025 1552 by Latasha Casey RN  Outcome: Progressing     Problem: Sleep Disturbance  Goal: Will exhibit normal sleeping pattern  Description: INTERVENTIONS:  1. Administer medication as ordered  2. Decrease environmental stimuli, including noise, as appropriate  3. Discourage social isolation and naps during the day  Outcome: Progressing

## 2025-01-28 NOTE — INTERDISCIPLINARY ROUNDS
Behavioral Health Interdisciplinary Rounds     Patient Name: Elvia Natarajan  Age: 64 y.o.  Room/Bed:  [facility]/  Primary Diagnosis: MDD (major depressive disorder), recurrent episode, mild (HCC)   Admission Status: Voluntary     Readmission within 30 days:   Power of  in place:   Patient requires a blocked bed: No          Reason for blocked bed: N/A  Sleep hours: 7+  Flu vaccine : not received this season      Participation in Care/Groups:  Yes  Medication Compliant?:  Medication Compliant: Selective  PRNS (last 24 hours):  PRNS: Sleep Aid and Pain    Restraints (last 24 hours):  NO  __________________________________________________  OQ Admission Analysis Survey completed:  OQ Admission Analysis Survey score:    __________________________________________________     Alcohol screening (AUDIT) completed -     Score:   Tobacco :  Illegal Drugs use:   CSSR Lifetime:     24 hour chart check complete: Yes    _______________________________________________    Patient goal(s) for today:   Treatment team focus/goals:   Progress note:      Spiritual Care Consult:   Financial concerns/prescription coverage:    Family contact:                        Family requesting physician contact today:    Discharge plan:   Access to weapons :                                                              Outpatient provider(s):     LOS:  11  Expected LOS:     Participating treatment team members: Elvia Natarajan, * (assigned SW),

## 2025-01-28 NOTE — PLAN OF CARE
Problem: Depression  Goal: Will be euthymic at discharge  Description: INTERVENTIONS:  1. Administer medication as ordered  2. Provide emotional support via 1:1 interaction with staff  3. Encourage involvement in milieu/groups/activities  4. Monitor for social isolation  Outcome: Progressing  Note: Out on unit passively present. Quietly sitting w peers during tv and meal time. Reports hopelessness with no improvements. States lethargic and having difficulty staying out of bed. No SI plan however still reports not wanting to be alive. Staff focus is on offering support

## 2025-01-28 NOTE — PLAN OF CARE
Problem: Depression  Goal: Will be euthymic at discharge  Description: INTERVENTIONS:  1. Administer medication as ordered  2. Provide emotional support via 1:1 interaction with staff  3. Encourage involvement in milieu/groups/activities  4. Monitor for social isolation  1/28/2025 1612 by Karely Toth RN  Outcome: Progressing     Problem: Anxiety  Goal: Will report anxiety at manageable levels  Description: INTERVENTIONS:  1. Administer medication as ordered  2. Teach and rehearse alternative coping skills  3. Provide emotional support with 1:1 interaction with staff  Outcome: Progressing    Patient calm and cooperative during shift    1930  Patient 24 hour urine collection taken to lab on ice.    Patient awake and alert and present in the milieu.   Interacts well with staff and peers.   Med and meal compliant.    Will continue to monitor q15 minutes for safety checks.

## 2025-01-29 PROCEDURE — 6370000000 HC RX 637 (ALT 250 FOR IP): Performed by: FAMILY MEDICINE

## 2025-01-29 PROCEDURE — 1240000000 HC EMOTIONAL WELLNESS R&B

## 2025-01-29 PROCEDURE — 6370000000 HC RX 637 (ALT 250 FOR IP): Performed by: PSYCHIATRY & NEUROLOGY

## 2025-01-29 PROCEDURE — 6370000000 HC RX 637 (ALT 250 FOR IP): Performed by: NURSE PRACTITIONER

## 2025-01-29 RX ORDER — METHYLPHENIDATE HYDROCHLORIDE 5 MG/1
2.5 TABLET ORAL 2 TIMES DAILY WITH MEALS
Status: DISCONTINUED | OUTPATIENT
Start: 2025-01-29 | End: 2025-01-30

## 2025-01-29 RX ADMIN — PANCRELIPASE LIPASE, PANCRELIPASE PROTEASE, PANCRELIPASE AMYLASE 20000 UNITS: 20000; 63000; 84000 CAPSULE, DELAYED RELEASE ORAL at 12:17

## 2025-01-29 RX ADMIN — CYANOCOBALAMIN TAB 500 MCG 500 MCG: 500 TAB at 08:42

## 2025-01-29 RX ADMIN — PANCRELIPASE LIPASE, PANCRELIPASE PROTEASE, PANCRELIPASE AMYLASE 20000 UNITS: 20000; 63000; 84000 CAPSULE, DELAYED RELEASE ORAL at 08:41

## 2025-01-29 RX ADMIN — TRAZODONE HYDROCHLORIDE 50 MG: 50 TABLET ORAL at 20:32

## 2025-01-29 RX ADMIN — Medication 100 MG: at 08:41

## 2025-01-29 RX ADMIN — Medication 1000 UNITS: at 08:41

## 2025-01-29 RX ADMIN — VENLAFAXINE 150 MG: 37.5 TABLET ORAL at 12:17

## 2025-01-29 RX ADMIN — PANCRELIPASE LIPASE, PANCRELIPASE PROTEASE, PANCRELIPASE AMYLASE 20000 UNITS: 20000; 63000; 84000 CAPSULE, DELAYED RELEASE ORAL at 17:08

## 2025-01-29 RX ADMIN — MIRTAZAPINE 45 MG: 15 TABLET, FILM COATED ORAL at 20:32

## 2025-01-29 RX ADMIN — ACETAMINOPHEN 650 MG: 325 TABLET ORAL at 12:40

## 2025-01-29 RX ADMIN — THERA TABS 1 TABLET: TAB at 08:41

## 2025-01-29 RX ADMIN — ARIPIPRAZOLE 5 MG: 5 TABLET ORAL at 08:41

## 2025-01-29 RX ADMIN — VENLAFAXINE 150 MG: 37.5 TABLET ORAL at 08:41

## 2025-01-29 RX ADMIN — METHYLPHENIDATE HYDROCHLORIDE 2.5 MG: 5 TABLET ORAL at 17:07

## 2025-01-29 RX ADMIN — ACETAMINOPHEN 650 MG: 325 TABLET ORAL at 20:32

## 2025-01-29 ASSESSMENT — PAIN SCALES - GENERAL
PAINLEVEL_OUTOF10: 6
PAINLEVEL_OUTOF10: 6

## 2025-01-29 ASSESSMENT — PAIN DESCRIPTION - LOCATION
LOCATION: HEAD
LOCATION: HEAD

## 2025-01-29 ASSESSMENT — PAIN DESCRIPTION - DESCRIPTORS
DESCRIPTORS: ACHING
DESCRIPTORS: ACHING

## 2025-01-29 ASSESSMENT — PAIN DESCRIPTION - ORIENTATION
ORIENTATION: INNER
ORIENTATION: MID

## 2025-01-29 NOTE — PLAN OF CARE
Problem: Depression  Goal: Will be euthymic at discharge  Description: INTERVENTIONS:  1. Administer medication as ordered  2. Provide emotional support via 1:1 interaction with staff  3. Encourage involvement in milieu/groups/activities  4. Monitor for social isolation  1/29/2025 1546 by Radha Cerda RN  Outcome: Progressing  Problem: Depression  Goal: Will be euthymic at discharge  Description: INTERVENTIONS:  1. Administer medication as ordered  2. Provide emotional support via 1:1 interaction with staff  3. Encourage involvement in milieu/groups/activities  4. Monitor for social isolation  1/29/2025 1546 by Radha Cerda, RN  Outcome: Progressing

## 2025-01-29 NOTE — PLAN OF CARE
Problem: Depression  Goal: Will be euthymic at discharge  Description: INTERVENTIONS:  1. Administer medication as ordered  2. Provide emotional support via 1:1 interaction with staff  3. Encourage involvement in milieu/groups/activities  4. Monitor for social isolation  Outcome: Progressing     Problem: Anxiety  Goal: Will report anxiety at manageable levels  Description: INTERVENTIONS:  1. Administer medication as ordered  2. Teach and rehearse alternative coping skills  3. Provide emotional support with 1:1 interaction with staff  Outcome: Progressing

## 2025-01-29 NOTE — PLAN OF CARE
Pt is resting in bed with eyes closed, appears to be asleep. Respirations are even and unlabored, NAD. Safety measures are in place and Q 15 minutes.   Problem: Sleep Disturbance  Goal: Will exhibit normal sleeping pattern  Description: INTERVENTIONS:  1. Administer medication as ordered  2. Decrease environmental stimuli, including noise, as appropriate  3. Discourage social isolation and naps during the day  Outcome: Progressing

## 2025-01-29 NOTE — INTERDISCIPLINARY ROUNDS
Behavioral Health Interdisciplinary Rounds     Patient Name: Elvia Natarajan  Age: 64 y.o.  Room/Bed:  [facility]/  Primary Diagnosis: MDD (major depressive disorder), recurrent episode, mild (HCC)   Admission Status: Voluntary     Readmission within 30 days:   Power of  in place:   Patient requires a blocked bed: No          Reason for blocked bed: N/A  Sleep hours:   Flu vaccine : not received this season      Participation in Care/Groups:  Yes  Medication Compliant?:  Medication Compliant: Yes  PRNS (last 24 hours):  PRNS: Sleep Aid    Restraints (last 24 hours):  NO  __________________________________________________    24 hour chart check complete: Yes    _______________________________________________    Patient goal(s) for today: meet with treatment team  Treatment team focus/goals: Plan to increase Ritalin from 2 0.5 mg p.o. twice daily to 5 mg p.o. twice daily / continue to work on discharge follow up appointments  Progress note:    She remains compliant with her medications and treatment , denies any issues with her medications , denies SI , but still feels very hopeless that nothing will change   Spiritual Care Consult:   Financial concerns/prescription coverage:  medicaid   Family contact:                        Family requesting physician contact today:    Discharge plan: she will return home   Access to weapons : no                                                              Outpatient provider(s): INDERJIT Fay, NP - IOP or PHP     LOS:  11  Expected LOS: Monday     Participating treatment team members: Elvia NatarajanMargaret gilman,ARIES KEN ,

## 2025-01-30 LAB
GLUCOSE BLD STRIP.AUTO-MCNC: 124 MG/DL (ref 65–117)
GLUCOSE BLD STRIP.AUTO-MCNC: 76 MG/DL (ref 65–117)
SERVICE CMNT-IMP: ABNORMAL
SERVICE CMNT-IMP: NORMAL

## 2025-01-30 PROCEDURE — 82962 GLUCOSE BLOOD TEST: CPT

## 2025-01-30 PROCEDURE — 6370000000 HC RX 637 (ALT 250 FOR IP): Performed by: FAMILY MEDICINE

## 2025-01-30 PROCEDURE — 1240000000 HC EMOTIONAL WELLNESS R&B

## 2025-01-30 PROCEDURE — 6370000000 HC RX 637 (ALT 250 FOR IP): Performed by: PSYCHIATRY & NEUROLOGY

## 2025-01-30 RX ORDER — METHYLPHENIDATE HYDROCHLORIDE 5 MG/1
5 TABLET ORAL 2 TIMES DAILY WITH MEALS
Status: DISCONTINUED | OUTPATIENT
Start: 2025-01-30 | End: 2025-02-03 | Stop reason: HOSPADM

## 2025-01-30 RX ADMIN — PANCRELIPASE LIPASE, PANCRELIPASE PROTEASE, PANCRELIPASE AMYLASE 20000 UNITS: 20000; 63000; 84000 CAPSULE, DELAYED RELEASE ORAL at 08:42

## 2025-01-30 RX ADMIN — THERA TABS 1 TABLET: TAB at 08:41

## 2025-01-30 RX ADMIN — MIRTAZAPINE 45 MG: 15 TABLET, FILM COATED ORAL at 20:55

## 2025-01-30 RX ADMIN — VENLAFAXINE 150 MG: 37.5 TABLET ORAL at 12:29

## 2025-01-30 RX ADMIN — TRAZODONE HYDROCHLORIDE 50 MG: 50 TABLET ORAL at 20:55

## 2025-01-30 RX ADMIN — ARIPIPRAZOLE 5 MG: 5 TABLET ORAL at 08:41

## 2025-01-30 RX ADMIN — METHYLPHENIDATE HYDROCHLORIDE 2.5 MG: 5 TABLET ORAL at 08:40

## 2025-01-30 RX ADMIN — PANCRELIPASE LIPASE, PANCRELIPASE PROTEASE, PANCRELIPASE AMYLASE 20000 UNITS: 20000; 63000; 84000 CAPSULE, DELAYED RELEASE ORAL at 17:03

## 2025-01-30 RX ADMIN — Medication 100 MG: at 08:41

## 2025-01-30 RX ADMIN — Medication 1000 UNITS: at 08:42

## 2025-01-30 RX ADMIN — METHYLPHENIDATE HYDROCHLORIDE 5 MG: 5 TABLET ORAL at 17:03

## 2025-01-30 RX ADMIN — VENLAFAXINE 150 MG: 37.5 TABLET ORAL at 08:41

## 2025-01-30 RX ADMIN — CYANOCOBALAMIN TAB 500 MCG 500 MCG: 500 TAB at 08:42

## 2025-01-30 RX ADMIN — PANCRELIPASE LIPASE, PANCRELIPASE PROTEASE, PANCRELIPASE AMYLASE 20000 UNITS: 20000; 63000; 84000 CAPSULE, DELAYED RELEASE ORAL at 12:29

## 2025-01-30 NOTE — PLAN OF CARE
Problem: Safety - Adult  Goal: Free from fall injury  Outcome: Progressing   Patient resting quietly in bed. No signs of distress. Even and unlabored breathing. Staff currently sitting in room for observation while CPAP is in use.

## 2025-01-30 NOTE — INTERDISCIPLINARY ROUNDS
Behavioral Health Interdisciplinary Rounds          Behavioral Health Interdisciplinary Rounds      Patient Name: Elvia Natarajan                  Age: 64 y.o.                 Room/Bed:  [facility]  Primary Diagnosis: MDD (major depressive disorder), recurrent episode, mild (HCC)       Admission Status: Voluntary                             Readmission within 30 days:   Power of  in place:   Patient requires a blocked bed: No          Reason for blocked bed: N/A  Sleep hours:   Flu vaccine : not received this season                                                 Participation in Care/Groups:  Yes  Medication Compliant?:  Medication Compliant: Yes  PRNS (last 24 hours):  PRNS: Sleep Aid                Restraints (last 24 hours):  NO  __________________________________________________     24 hour chart check complete: Yes     _______________________________________________     Patient goal(s) for today: meet with treatment team  Treatment team focus/goals: Plan to increase Ritalin from 2 0.5 mg p.o. twice daily to 5 mg p.o. twice daily / continue to work on discharge follow up appointments  Progress note:    She remains compliant with her medications and treatment , denies any issues with her medications , denies SI , but still feels very hopeless that nothing will change   Spiritual Care Consult:   Financial concerns/prescription coverage:  medicaid   Family contact:                        Family requesting physician contact today:    Discharge plan: she will return home   Access to weapons : no                                                              Outpatient provider(s): INDERJIT Fay, NP - IOP or PHP      LOS:  11                     Expected LOS: Monday      Participating treatment team members: Elvia Natarajan, ARIES Pugh Dr. ,    Rere WeathersRN

## 2025-01-30 NOTE — PLAN OF CARE
Problem: Depression  Goal: Will be euthymic at discharge  Description: INTERVENTIONS:  1. Administer medication as ordered  2. Provide emotional support via 1:1 interaction with staff  3. Encourage involvement in milieu/groups/activities  4. Monitor for social isolation  Outcome: Progressing  Note: Up on unit passively engaged, social w select peers and attending groups. Increase ADL's with no prompting required. Future focused this am and discussing motivation to work again and plans to visit a friend. No suicidal plan, no intent and no self harming behaviors. Verbalized understanding of her plan of care. Staff focus is on offering support

## 2025-01-31 LAB
GLUCOSE BLD STRIP.AUTO-MCNC: 71 MG/DL (ref 65–117)
SERVICE CMNT-IMP: NORMAL

## 2025-01-31 PROCEDURE — 1240000000 HC EMOTIONAL WELLNESS R&B

## 2025-01-31 PROCEDURE — 6370000000 HC RX 637 (ALT 250 FOR IP): Performed by: PSYCHIATRY & NEUROLOGY

## 2025-01-31 PROCEDURE — 82962 GLUCOSE BLOOD TEST: CPT

## 2025-01-31 PROCEDURE — 6370000000 HC RX 637 (ALT 250 FOR IP): Performed by: FAMILY MEDICINE

## 2025-01-31 RX ORDER — VITAMIN B COMPLEX
3000 TABLET ORAL DAILY
Status: DISCONTINUED | OUTPATIENT
Start: 2025-02-01 | End: 2025-02-03 | Stop reason: HOSPADM

## 2025-01-31 RX ORDER — MULTIVITAMIN WITH IRON
1 TABLET ORAL 2 TIMES DAILY
Status: DISCONTINUED | OUTPATIENT
Start: 2025-01-31 | End: 2025-02-03 | Stop reason: HOSPADM

## 2025-01-31 RX ORDER — CALCIUM CARBONATE 500(1250)
500 TABLET ORAL 2 TIMES DAILY
Status: DISCONTINUED | OUTPATIENT
Start: 2025-01-31 | End: 2025-02-03 | Stop reason: HOSPADM

## 2025-01-31 RX ADMIN — PANCRELIPASE LIPASE, PANCRELIPASE PROTEASE, PANCRELIPASE AMYLASE 115000 UNITS: 20000; 63000; 84000 CAPSULE, DELAYED RELEASE ORAL at 17:00

## 2025-01-31 RX ADMIN — THERA TABS 1 TABLET: TAB at 21:19

## 2025-01-31 RX ADMIN — Medication 1000 UNITS: at 08:06

## 2025-01-31 RX ADMIN — CALCIUM 500 MG: 500 TABLET ORAL at 16:03

## 2025-01-31 RX ADMIN — ARIPIPRAZOLE 5 MG: 5 TABLET ORAL at 08:06

## 2025-01-31 RX ADMIN — CALCIUM 500 MG: 500 TABLET ORAL at 21:20

## 2025-01-31 RX ADMIN — METHYLPHENIDATE HYDROCHLORIDE 5 MG: 5 TABLET ORAL at 17:01

## 2025-01-31 RX ADMIN — MIRTAZAPINE 45 MG: 15 TABLET, FILM COATED ORAL at 21:19

## 2025-01-31 RX ADMIN — METHYLPHENIDATE HYDROCHLORIDE 5 MG: 5 TABLET ORAL at 08:06

## 2025-01-31 RX ADMIN — THERA TABS 1 TABLET: TAB at 08:06

## 2025-01-31 RX ADMIN — PANCRELIPASE LIPASE, PANCRELIPASE PROTEASE, PANCRELIPASE AMYLASE 20000 UNITS: 20000; 63000; 84000 CAPSULE, DELAYED RELEASE ORAL at 08:06

## 2025-01-31 RX ADMIN — PANCRELIPASE LIPASE, PANCRELIPASE PROTEASE, PANCRELIPASE AMYLASE 115000 UNITS: 20000; 63000; 84000 CAPSULE, DELAYED RELEASE ORAL at 12:01

## 2025-01-31 RX ADMIN — Medication 100 MG: at 08:06

## 2025-01-31 RX ADMIN — VENLAFAXINE 150 MG: 37.5 TABLET ORAL at 08:06

## 2025-01-31 RX ADMIN — VENLAFAXINE 150 MG: 37.5 TABLET ORAL at 11:58

## 2025-01-31 RX ADMIN — CYANOCOBALAMIN TAB 500 MCG 500 MCG: 500 TAB at 08:06

## 2025-01-31 ASSESSMENT — PAIN - FUNCTIONAL ASSESSMENT: PAIN_FUNCTIONAL_ASSESSMENT: NONE - DENIES PAIN

## 2025-01-31 NOTE — INTERDISCIPLINARY ROUNDS
Behavioral Health Interdisciplinary Rounds     Patient Name: Elvia Natarajan  Age: 64 y.o.  Room/Bed:  [facility]/  Primary Diagnosis: MDD (major depressive disorder), recurrent episode, mild (HCC)   Admission Status: Voluntary     Readmission within 30 days:   Power of  in place:   Patient requires a blocked bed: No          Reason for blocked bed: N/A  Sleep hours: 7+  Flu vaccine : not received this season      Participation in Care/Groups:  Yes  Medication Compliant?:  Medication Compliant: Yes  PRNS (last 24 hours):  PRNS: Sleep Aid    Restraints (last 24 hours):  NO  __________________________________________________  OQ Admission Analysis Survey completed:  OQ Admission Analysis Survey score:    __________________________________________________     Alcohol screening (AUDIT) completed -     Score:   Tobacco :  Illegal Drugs use:   CSSR Lifetime:     24 hour chart check complete: Yes    _______________________________________________    Patient goal(s) for today: Communicate needs to staff   Treatment team focus/goals: Assess pt, manage medications, discharge planning   Progress note: Pt reports fair sleep and improved appetite. Pt denies SI, HI, AHVH. Pts medications adjusted and pt is compliant. SW arranging [facility] for pt.      Spiritual Care Consult:   Financial concerns/prescription coverage:    Family contact: Anupam Natarajan (Other)  [phone]                        Family requesting physician contact today:    Discharge plan: Pt to discharge home   Access to weapons : no                                                              Outpatient provider(s): [facility]    LOS:  14  Expected LOS: 2/3/25    Participating treatment team members: Stormy Gallagher, MSW, Tom ROSE, RN, Radha FRANZ, PHARMD

## 2025-01-31 NOTE — PLAN OF CARE
Patient has isolated self in room. Affect blunted but she reports feeling better. She continues to remain isolative. Appetite has been fair. Patient reports that she is consuming 100% of supplements. During treatment team, recommendation to discontinue her Trazodone so that she will be less sleepy during the day. She denies SI/HI. Denies feeling anxious.   Problem: Depression  Goal: Will be euthymic at discharge  Description: INTERVENTIONS:  1. Administer medication as ordered  2. Provide emotional support via 1:1 interaction with staff  3. Encourage involvement in milieu/groups/activities  4. Monitor for social isolation  Outcome: Progressing     Problem: Anxiety  Goal: Will report anxiety at manageable levels  Description: INTERVENTIONS:  1. Administer medication as ordered  2. Teach and rehearse alternative coping skills  3. Provide emotional support with 1:1 interaction with staff  Outcome: Progressing     Problem: Sleep Disturbance  Goal: Will exhibit normal sleeping pattern  Description: INTERVENTIONS:  1. Administer medication as ordered  2. Decrease environmental stimuli, including noise, as appropriate  3. Discourage social isolation and naps during the day  Outcome: Progressing     Problem: Nutrition Deficit:  Goal: Optimize nutritional status  Outcome: Progressing

## 2025-02-01 PROCEDURE — 1240000000 HC EMOTIONAL WELLNESS R&B

## 2025-02-01 PROCEDURE — 6370000000 HC RX 637 (ALT 250 FOR IP): Performed by: PSYCHIATRY & NEUROLOGY

## 2025-02-01 RX ADMIN — METHYLPHENIDATE HYDROCHLORIDE 5 MG: 5 TABLET ORAL at 17:10

## 2025-02-01 RX ADMIN — PANCRELIPASE LIPASE, PANCRELIPASE PROTEASE, PANCRELIPASE AMYLASE 115000 UNITS: 20000; 63000; 84000 CAPSULE, DELAYED RELEASE ORAL at 12:43

## 2025-02-01 RX ADMIN — MIRTAZAPINE 45 MG: 15 TABLET, FILM COATED ORAL at 20:42

## 2025-02-01 RX ADMIN — METHYLPHENIDATE HYDROCHLORIDE 5 MG: 5 TABLET ORAL at 09:08

## 2025-02-01 RX ADMIN — THERA TABS 1 TABLET: TAB at 09:06

## 2025-02-01 RX ADMIN — THERA TABS 1 TABLET: TAB at 20:42

## 2025-02-01 RX ADMIN — VENLAFAXINE 150 MG: 37.5 TABLET ORAL at 09:09

## 2025-02-01 RX ADMIN — VENLAFAXINE 150 MG: 37.5 TABLET ORAL at 12:42

## 2025-02-01 RX ADMIN — CALCIUM 500 MG: 500 TABLET ORAL at 09:09

## 2025-02-01 RX ADMIN — CYANOCOBALAMIN TAB 500 MCG 500 MCG: 500 TAB at 09:09

## 2025-02-01 RX ADMIN — Medication 3000 UNITS: at 09:11

## 2025-02-01 RX ADMIN — PANCRELIPASE LIPASE, PANCRELIPASE PROTEASE, PANCRELIPASE AMYLASE 115000 UNITS: 20000; 63000; 84000 CAPSULE, DELAYED RELEASE ORAL at 09:07

## 2025-02-01 RX ADMIN — Medication 100 MG: at 09:06

## 2025-02-01 RX ADMIN — PANCRELIPASE LIPASE, PANCRELIPASE PROTEASE, PANCRELIPASE AMYLASE 115000 UNITS: 20000; 63000; 84000 CAPSULE, DELAYED RELEASE ORAL at 17:10

## 2025-02-01 RX ADMIN — CALCIUM 500 MG: 500 TABLET ORAL at 20:42

## 2025-02-01 RX ADMIN — ARIPIPRAZOLE 5 MG: 5 TABLET ORAL at 09:06

## 2025-02-01 NOTE — PLAN OF CARE
Problem: Depression  Goal: Will be euthymic at discharge  Description: INTERVENTIONS:  1. Administer medication as ordered  2. Provide emotional support via 1:1 interaction with staff  3. Encourage involvement in milieu/groups/activities  4. Monitor for social isolation  2/1/2025 0013 by Semaj Moctezuma, RN  Outcome: Progressing     Problem: Anxiety  Goal: Will report anxiety at manageable levels  Description: INTERVENTIONS:  1. Administer medication as ordered  2. Teach and rehearse alternative coping skills  3. Provide emotional support with 1:1 interaction with staff  2/1/2025 0013 by Semaj Moctezuma, RN  Outcome: Progressing   Patient currently resting in bed with eyes closed, appears to be sleeping, respirations even and unlabored.

## 2025-02-01 NOTE — PLAN OF CARE
Problem: Depression  Goal: Will be euthymic at discharge  Description: INTERVENTIONS:  1. Administer medication as ordered  2. Provide emotional support via 1:1 interaction with staff  3. Encourage involvement in milieu/groups/activities  4. Monitor for social isolation  2/1/2025 1020 by Rere Weathers, RN  Outcome: Progressing  Note: Out on unit this am for meals and meds. Quietly present reading newspaper. No complaints voiced at this time. Compliant with her plan of care. Daily goal is to complete ADL's prior to evening hours and incresae time out on unit. Staff focus is on offering support and reassurance.     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 Future focused with voicing goals on future work and activities she would like to accomplish with her pets and friends. Staff continue to encourage time out of bed.    Patient was informed of test results.

## 2025-02-02 PROCEDURE — 6370000000 HC RX 637 (ALT 250 FOR IP): Performed by: NURSE PRACTITIONER

## 2025-02-02 PROCEDURE — 6370000000 HC RX 637 (ALT 250 FOR IP): Performed by: PSYCHIATRY & NEUROLOGY

## 2025-02-02 PROCEDURE — 1240000000 HC EMOTIONAL WELLNESS R&B

## 2025-02-02 RX ADMIN — Medication 3000 UNITS: at 08:48

## 2025-02-02 RX ADMIN — METHYLPHENIDATE HYDROCHLORIDE 5 MG: 5 TABLET ORAL at 17:25

## 2025-02-02 RX ADMIN — THERA TABS 1 TABLET: TAB at 20:37

## 2025-02-02 RX ADMIN — ACETAMINOPHEN 650 MG: 325 TABLET ORAL at 20:37

## 2025-02-02 RX ADMIN — PANCRELIPASE LIPASE, PANCRELIPASE PROTEASE, PANCRELIPASE AMYLASE 115000 UNITS: 20000; 63000; 84000 CAPSULE, DELAYED RELEASE ORAL at 17:25

## 2025-02-02 RX ADMIN — THERA TABS 1 TABLET: TAB at 08:48

## 2025-02-02 RX ADMIN — CYANOCOBALAMIN TAB 500 MCG 500 MCG: 500 TAB at 08:48

## 2025-02-02 RX ADMIN — ARIPIPRAZOLE 5 MG: 5 TABLET ORAL at 08:48

## 2025-02-02 RX ADMIN — CALCIUM 500 MG: 500 TABLET ORAL at 08:48

## 2025-02-02 RX ADMIN — VENLAFAXINE 150 MG: 37.5 TABLET ORAL at 08:48

## 2025-02-02 RX ADMIN — PANCRELIPASE LIPASE, PANCRELIPASE PROTEASE, PANCRELIPASE AMYLASE 115000 UNITS: 20000; 63000; 84000 CAPSULE, DELAYED RELEASE ORAL at 13:26

## 2025-02-02 RX ADMIN — METHYLPHENIDATE HYDROCHLORIDE 5 MG: 5 TABLET ORAL at 08:48

## 2025-02-02 RX ADMIN — Medication 100 MG: at 08:48

## 2025-02-02 RX ADMIN — VENLAFAXINE 150 MG: 37.5 TABLET ORAL at 12:52

## 2025-02-02 RX ADMIN — MIRTAZAPINE 45 MG: 15 TABLET, FILM COATED ORAL at 20:38

## 2025-02-02 RX ADMIN — CALCIUM 500 MG: 500 TABLET ORAL at 20:38

## 2025-02-02 RX ADMIN — PANCRELIPASE LIPASE, PANCRELIPASE PROTEASE, PANCRELIPASE AMYLASE 115000 UNITS: 20000; 63000; 84000 CAPSULE, DELAYED RELEASE ORAL at 08:47

## 2025-02-02 ASSESSMENT — PAIN DESCRIPTION - DESCRIPTORS: DESCRIPTORS: ACHING

## 2025-02-02 ASSESSMENT — PAIN SCALES - GENERAL: PAINLEVEL_OUTOF10: 6

## 2025-02-02 ASSESSMENT — PAIN DESCRIPTION - LOCATION: LOCATION: THROAT;HEAD

## 2025-02-02 NOTE — PLAN OF CARE
Problem: Depression  Goal: Will be euthymic at discharge  Description: INTERVENTIONS:  1. Administer medication as ordered  2. Provide emotional support via 1:1 interaction with staff  3. Encourage involvement in milieu/groups/activities  4. Monitor for social isolation  2/1/2025 7894 by Whit Gruber, RN  Outcome: Progressing   PATIENT IS CURRENTLY RESTING IN BED. NO DISTRESS NOTED. SAFETY MEASURE IN PLACE. WILL CONTINUE TO MONITOR WITH Q15 MINUTE CHECKS.

## 2025-02-02 NOTE — PLAN OF CARE
Problem: Depression  Goal: Will be euthymic at discharge  Description: INTERVENTIONS:  1. Administer medication as ordered  2. Provide emotional support via 1:1 interaction with staff  3. Encourage involvement in milieu/groups/activities  4. Monitor for social isolation  2/2/2025 1541 by Latasha Casey, RN  Outcome: Progressing     Problem: Anxiety  Goal: Will report anxiety at manageable levels  Description: INTERVENTIONS:  1. Administer medication as ordered  2. Teach and rehearse alternative coping skills  3. Provide emotional support with 1:1 interaction with staff  Outcome: Progressing     Problem: Safety - Adult  Goal: Free from fall injury  Outcome: Progressing   Pt out on unit engaged with peers. Denies current SI/HI/AVH, but continues to report some hopelessness. Remains medication and meal compliant. Will continue to monitor q15 minutes for safety.

## 2025-02-02 NOTE — PLAN OF CARE
Problem: Depression  Goal: Will be euthymic at discharge  Description: INTERVENTIONS:  1. Administer medication as ordered  2. Provide emotional support via 1:1 interaction with staff  3. Encourage involvement in milieu/groups/activities  4. Monitor for social isolation  2/2/2025 0910 by Violet Philip, RN  Outcome: Progressing  Note: Pt participated in treatment team. Ate breakfast during the shift. Continues to feel her \"energy\" is low. Feels mood has improved slightly. Stated she is not \"optimistic\" about her future. Denies SI/HI at this time. Encouraged pt to participate on the unit.

## 2025-02-03 VITALS
SYSTOLIC BLOOD PRESSURE: 89 MMHG | BODY MASS INDEX: 18.98 KG/M2 | TEMPERATURE: 98.1 F | OXYGEN SATURATION: 98 % | HEIGHT: 66 IN | HEART RATE: 82 BPM | WEIGHT: 118.1 LBS | DIASTOLIC BLOOD PRESSURE: 62 MMHG | RESPIRATION RATE: 16 BRPM

## 2025-02-03 LAB
COLLECT DURATION TIME UR: 24 HR
COPPER 24H UR-MRATE: 4 UG/24 HR (ref 3–35)
COPPER UR-MCNC: 1 UG/L
COPPER/CREAT UR: 3 UG/G CREAT (ref 0–49)
CREAT UR-MCNC: 0.33 G/L (ref 0.3–3)
GLUCOSE BLD STRIP.AUTO-MCNC: 84 MG/DL (ref 65–117)
SERVICE CMNT-IMP: NORMAL
SPECIMEN VOL ?TM UR: 3500 ML

## 2025-02-03 PROCEDURE — 82962 GLUCOSE BLOOD TEST: CPT

## 2025-02-03 PROCEDURE — 6370000000 HC RX 637 (ALT 250 FOR IP): Performed by: PSYCHIATRY & NEUROLOGY

## 2025-02-03 RX ORDER — MIRTAZAPINE 45 MG/1
45 TABLET, FILM COATED ORAL NIGHTLY
Qty: 30 TABLET | Refills: 0 | Status: SHIPPED | OUTPATIENT
Start: 2025-02-03 | End: 2025-03-05

## 2025-02-03 RX ORDER — MULTIVITAMIN WITH IRON
1 TABLET ORAL 2 TIMES DAILY
Qty: 60 TABLET | Refills: 0 | Status: SHIPPED | OUTPATIENT
Start: 2025-02-03 | End: 2025-03-05

## 2025-02-03 RX ORDER — CALCIUM CARBONATE 500(1250)
500 TABLET ORAL 2 TIMES DAILY
Qty: 60 TABLET | Refills: 0 | Status: SHIPPED | OUTPATIENT
Start: 2025-02-03 | End: 2025-03-05

## 2025-02-03 RX ORDER — LANOLIN ALCOHOL/MO/W.PET/CERES
100 CREAM (GRAM) TOPICAL DAILY
Qty: 30 TABLET | Refills: 0 | Status: SHIPPED | OUTPATIENT
Start: 2025-02-04 | End: 2025-03-06

## 2025-02-03 RX ORDER — METHYLPHENIDATE HYDROCHLORIDE 5 MG/1
5 TABLET ORAL 2 TIMES DAILY WITH MEALS
Qty: 60 TABLET | Refills: 0 | Status: SHIPPED | OUTPATIENT
Start: 2025-02-03 | End: 2025-03-05

## 2025-02-03 RX ORDER — VENLAFAXINE 75 MG/1
150 TABLET ORAL 2 TIMES DAILY
Qty: 120 TABLET | Refills: 0 | Status: SHIPPED | OUTPATIENT
Start: 2025-02-03 | End: 2025-03-05

## 2025-02-03 RX ORDER — VITAMIN B COMPLEX
3000 TABLET ORAL DAILY
Qty: 90 TABLET | Refills: 0 | Status: SHIPPED | OUTPATIENT
Start: 2025-02-04 | End: 2025-03-06

## 2025-02-03 RX ORDER — ARIPIPRAZOLE 5 MG/1
5 TABLET ORAL DAILY
Qty: 30 TABLET | Refills: 0 | Status: SHIPPED | OUTPATIENT
Start: 2025-02-04 | End: 2025-03-06

## 2025-02-03 RX ORDER — PANCRELIPASE LIPASE, PANCRELIPASE PROTEASE, PANCRELIPASE AMYLASE 252600; 60000; 189600 [USP'U]/1; [USP'U]/1; [USP'U]/1
CAPSULE, DELAYED RELEASE ORAL
Qty: 240 CAPSULE | Refills: 0 | Status: SHIPPED | OUTPATIENT
Start: 2025-02-03

## 2025-02-03 RX ADMIN — VENLAFAXINE 150 MG: 37.5 TABLET ORAL at 12:48

## 2025-02-03 RX ADMIN — VENLAFAXINE 150 MG: 37.5 TABLET ORAL at 08:31

## 2025-02-03 RX ADMIN — CYANOCOBALAMIN TAB 500 MCG 500 MCG: 500 TAB at 08:31

## 2025-02-03 RX ADMIN — ARIPIPRAZOLE 5 MG: 5 TABLET ORAL at 08:31

## 2025-02-03 RX ADMIN — Medication 3000 UNITS: at 08:31

## 2025-02-03 RX ADMIN — Medication 100 MG: at 08:31

## 2025-02-03 RX ADMIN — PANCRELIPASE LIPASE, PANCRELIPASE PROTEASE, PANCRELIPASE AMYLASE 115000 UNITS: 20000; 63000; 84000 CAPSULE, DELAYED RELEASE ORAL at 12:47

## 2025-02-03 RX ADMIN — PANCRELIPASE LIPASE, PANCRELIPASE PROTEASE, PANCRELIPASE AMYLASE 115000 UNITS: 20000; 63000; 84000 CAPSULE, DELAYED RELEASE ORAL at 08:30

## 2025-02-03 RX ADMIN — METHYLPHENIDATE HYDROCHLORIDE 5 MG: 5 TABLET ORAL at 08:31

## 2025-02-03 RX ADMIN — CALCIUM 500 MG: 500 TABLET ORAL at 08:31

## 2025-02-03 RX ADMIN — THERA TABS 1 TABLET: TAB at 08:31

## 2025-02-03 NOTE — PROGRESS NOTES
Behavioral Services                                              Medicare Re-Certification    I certify that the inpatient psychiatric hospital services furnished since the previous certification/re-certification were, and continue to be, medically necessary for;    [x] (1) Treatment which could reasonably be expected to improve the patient's condition,    [] (2) Or for diagnostic study.    Estimated length of stay/service 3-5 days.    Plan for post-hospital care Outpatient Psychiatry.    This patient continues to need, on a daily basis, active treatment furnished directly by or requiring the supervision of inpatient psychiatric personnel.    Electronically signed by Julieta Bermeo MD on 1/25/2025 at 11:45 AM   
      Behavioral Services  Medicare Certification Upon Admission    I certify that this patient's inpatient psychiatric hospital admission is medically necessary for:    [x] (1) Treatment which could reasonably be expected to improve this patient's condition,       [] (2) Or for diagnostic study;     AND     [x](2) The inpatient psychiatric services are provided while the individual is under the care of a physician and are included in the individualized plan of care.    Estimated length of stay/service 3-5 days.    Plan for post-hospital care outpatient psychiatry.    Electronically signed by Julieta Bermeo MD on 1/17/2025 at 11:24 AM      
[address] [address] [phone]        Chart was reviewed.  LFTs were normal today.  Additional labs are pending.  Recommend outpatient follow up with primary gastroenterologist, Dr. Linares.  We will sign off and be available again as needed.  Please call us with any questions.  Thank you.  Patient was discussed with Dr. Amin.       REYES Rowland  
Admission Medication Reconciliation:    Information obtained from:  patient interview, Insurance claims data, review of EMR, and [facility]  RxQuery data available¹:  YES    Comments/Recommendations: Updated PTA meds/reviewed patient's allergies.    1)  The patient reports she is only taking desvenlafaxine scheduled. She takes mirtazapine PRN (but has not needed recently). She denies using scheduled QHS in the past. She was recently prescribed cariprazine but stopped it as she felt is was causing diarrhea.     The patient has filled the following medications in the past:  1/2024-3/2024: lamotrigine  1/2024-9/2024: bupropion  3/2024-11/2024: cariprazine  3/2024-11/2024: trazodone  4/2024-current: mirtazapine  10/2024: lorazepam   10/2024-current: desvenlafaxine    She is not able to provide details regarding previous medication trials. She reports memory problems and is followed by [facility] neurology. Of note, she had a duodenum switch procedure so absorption of medications may be altered.     2)  The [place] Prescription Monitoring Program () was assessed to determine fill history of any controlled medications. The patient has filled the following controlled medications in the last 12 months.  - 10/7/24: lorazepam 0.5 mg, #10 for 5 day supply  - 4/24/24: methylphenidate 5 mg, #60 for 30 day supply  - 3/27/24: methylphenidate 5 mg, #90 for 30 day supply  - 2/14/24: methylphenidate 5 mg, #90 for 30 day supply    3)  Medication changes (since last review):  Added  - desvenlafaxine 50 mg daily  - mirtazapine 7.5 mg QHS PRN  - multivitamin daily  - calcium/vitamin D daily    Removed  - Methylphenidate, ondansetron, methocarbamol   ¹RxQuery pharmacy benefit data reflects medications filled and processed through the patient's insurance, however this data does NOT capture whether the medication was picked up or is currently being taken by the patient.    Allergies:  Sulfa antibiotics    Significant PMH/Disease States:   Past 
Laboratory Monitoring for Antipsychotics:    This patient is currently prescribed the following medication(s):   Current Facility-Administered Medications: mirtazapine (REMERON) tablet 45 mg, 45 mg, Oral, Nightly  ARIPiprazole (ABILIFY) tablet 5 mg, 5 mg, Oral, Daily  lipase-protease-amylase (ZENPEP) 28406-64830 units delayed release capsule 20,000 Units, 20,000 Units, Oral, TID WC  thiamine tablet 100 mg, 100 mg, Oral, Daily  venlafaxine (EFFEXOR) tablet 150 mg, 150 mg, Oral, Lunch  venlafaxine (EFFEXOR) tablet 150 mg, 150 mg, Oral, Daily  Vitamin D (CHOLECALCIFEROL) tablet 1,000 Units, 1,000 Units, Oral, Daily  multivitamin 1 tablet, 1 tablet, Oral, Daily  vitamin B-12 (CYANOCOBALAMIN) tablet 500 mcg, 500 mcg, Oral, Daily  acetaminophen (TYLENOL) tablet 650 mg, 650 mg, Oral, Q4H PRN  polyethylene glycol (GLYCOLAX) packet 17 g, 17 g, Oral, Daily PRN  senna (SENOKOT) tablet 8.6 mg, 1 tablet, Oral, Daily PRN  aluminum & magnesium hydroxide-simethicone (MAALOX) 200-200-20 MG/5ML suspension 30 mL, 30 mL, Oral, Q6H PRN  hydrOXYzine HCl (ATARAX) tablet 50 mg, 50 mg, Oral, TID PRN  haloperidol (HALDOL) tablet 5 mg, 5 mg, Oral, Q4H PRN **OR** haloperidol lactate (HALDOL) injection 5 mg, 5 mg, IntraMUSCular, Q4H PRN  diphenhydrAMINE (BENADRYL) injection 50 mg, 50 mg, IntraMUSCular, Q4H PRN  traZODone (DESYREL) tablet 50 mg, 50 mg, Oral, Nightly PRN    The following labs have been completed for monitoring of antipsychotics and/or mood stabilizers:    Height, Weight, BMI Estimation  Estimated body mass index is 20.31 kg/m² as calculated from the following:    Height as of this encounter: 1.676 m (5' 5.98\").    Weight as of this encounter: 57.1 kg (125 lb 12.8 oz).     Vital Signs/Blood Pressure  BP 90/62   Pulse 81   Temp 98.2 °F (36.8 °C) (Oral)   Resp 16   Ht 1.676 m (5' 5.98\")   Wt 57.1 kg (125 lb 12.8 oz)   SpO2 95%   BMI 20.31 kg/m²     Renal Function, Hepatic Function and Chemistry  Estimated Creatinine 
Pharmacist Discharge Medication Reconciliation    Discharging Provider: Dr. Bermeo    Significant PMH:   Past Medical History:   Diagnosis Date    Arthritis     OSTEO    Nausea & vomiting     YULIANA (obstructive sleep apnea)     on CPAP for last 3 y ears    Psychiatric disorder     DEPRESSION    Sleep apnea     uses c pap     Chief Complaint for this Admission:   Chief Complaint   Patient presents with    Depression     Allergies: Sulfa antibiotics    Discharge Medications:      Medication List        START taking these medications      ARIPiprazole 5 MG tablet  Commonly known as: ABILIFY  Take 1 tablet by mouth daily  Start taking on: February 4, 2025     calcium elemental 500 MG Tabs tablet  Commonly known as: OSCAL  Take 1 tablet by mouth 2 times daily     cyanocobalamin 500 MCG tablet  Take 1 tablet by mouth daily  Start taking on: February 4, 2025     multivitamin Tabs tablet  Take 1 tablet by mouth in the morning and at bedtime     thiamine 100 MG tablet  Take 1 tablet by mouth daily  Start taking on: February 4, 2025     venlafaxine 75 MG tablet  Commonly known as: EFFEXOR  Take 2 tablets by mouth 2 times daily     Vitamin D 25 MCG (1000 UT) Tabs tablet  Commonly known as: CHOLECALCIFEROL  Take 3 tablets by mouth daily  Start taking on: February 4, 2025     Zenpep 00838-018998 units Cpep  Generic drug: Pancrelipase (Lip-Prot-Amyl)  Take 2 capsules 3 times daily with meals and 1 capsule with snacks as needed.            CHANGE how you take these medications      methylphenidate 5 MG tablet  Commonly known as: RITALIN  Take 1 tablet by mouth 2 times daily (with meals) for 30 days. Max Daily Amount: 10 mg  What changed:   how much to take  when to take this     mirtazapine 45 MG tablet  Commonly known as: REMERON  Take 1 tablet by mouth nightly  What changed:   medication strength  how much to take  when to take this  reasons to take this            STOP taking these medications      Calcium 500/Vitamin D 500-3.125 
RO[facility]             GI PROGRESS NOTE        NAME: Elvia Natarajan   :  1960   MRN:  [id number]       Subjective:   Patient pleasant at time of visit, admitted to behavioral health for depression     Does not know if cholestyramine helped but does not like flavor      Objective:   RUQ abd US no acute process small pericardial effusion   Elastase low 172  Ceruloplasmin low 8.6   Asma, ama, monica viral hepatitis panel, celiac, tsh normal. Ferritin normal. TIBC low 162.   A1a low 87    VITALS:   Last 24hrs VS reviewed since prior progress note. Most recent are:  Vitals:    25 1554   BP: 108/71   Pulse: 67   Resp:    Temp: 98.6 °F (37 °C)   SpO2:      No intake or output data in the 24 hours ending 25 1622    PHYSICAL EXAM:  General: Alert, in no acute distress    HEENT: Anicteric sclerae.  Lungs:            CTA Bilaterally.   Heart:  Regular  rhythm,    Abdomen: Soft, Non distended, Non tender.  (+)Bowel sounds, no HSM  Extremities: No c/c/e  Neurologic:  CN 2-12 gi, Alert and oriented X 3.  No acute neurological distress   Psych:   Good insight. Not anxious nor agitated.    Lab Data Reviewed:   No results for input(s): \"WBC\", \"HGB\", \"HCT\", \"PLT\" in the last 72 hours.  No results for input(s): \"NA\", \"K\", \"CL\", \"CO2\", \"BUN\", \"GLU\", \"PHOS\" in the last 72 hours.    Invalid input(s): \"CREA\", \"CA\"    No results for input(s): \"TP\", \"GLOB\", \"GGT\" in the last 72 hours.    Invalid input(s): \"SGOT\", \"GPT\", \"AP\", \"TBIL\", \"ALB\", \"AML\", \"AMYP\", \"LPSE\", \"HLPSE\"      ________________________________________________________________________  Patient Active Problem List   Diagnosis    Obesity    DJD (degenerative joint disease) of knee    Depression    MDD (major depressive disorder), recurrent episode, mild (HCC)    Moderate protein-energy malnutrition (HCC)         Assessment and Plan:  63yo female admitted for depression GI asked to see for abnormal stool and elevated liver enzymes.   In regards 
[facility]             GI PROGRESS NOTE        NAME: Elvia Natarajan   :  1960   MRN:  [id number]       Subjective:   Patient pleasant at time of visit, admitted to behavioral health for depression     Agreeable to try cholestyramine       Objective:   RUQ abd US no acute process small pericardial effusion   Liver serologies pending   Elastase pending    VITALS:   Last 24hrs VS reviewed since prior progress note. Most recent are:  Vitals:    25 0749   BP: 93/62   Pulse: 62   Resp: 16   Temp: 97.7 °F (36.5 °C)   SpO2: 99%     No intake or output data in the 24 hours ending 25 1447    PHYSICAL EXAM:  General: Alert, in no acute distress    HEENT: Anicteric sclerae.  Lungs:            CTA Bilaterally.   Heart:  Regular  rhythm,    Abdomen: Soft, Non distended, Non tender.  (+)Bowel sounds, no HSM  Extremities: No c/c/e  Neurologic:  CN 2-12 gi, Alert and oriented X 3.  No acute neurological distress   Psych:   Good insight. Not anxious nor agitated.    Lab Data Reviewed:   No results for input(s): \"WBC\", \"HGB\", \"HCT\", \"PLT\" in the last 72 hours.  Recent Labs     25  0631      K 4.1   *   CO2 30   BUN 10     Recent Labs     25  0631   GLOB 1.9*       ________________________________________________________________________  Patient Active Problem List   Diagnosis    Obesity    DJD (degenerative joint disease) of knee    Depression    MDD (major depressive disorder), recurrent episode, mild (HCC)    Moderate protein-energy malnutrition (HCC)         Assessment and Plan:  63yo female admitted for depression GI asked to see for abnormal stool and elevated liver enzymes.   In regards to abnormal stool, suspect this is secondary to malabsorption. Consideration also given to EPI. Check elastase. Trial cholestyramine.      For elevated lfts, this has been ongoing for several years and has not had full serological workup. RUQ abd ultrasound unremarkable liver. Full 
Spiritual Health History and Assessment/Progress Note  [facility] [place]    Behavioral Health,  ,  , Initial Encounter    Name: Elvia Natarajan MRN: [id number]    Age: 64 y.o.     Sex: female   Language: English   Buddhism: Hindu   MDD (major depressive disorder), recurrent episode, mild (HCC)     Date: 1/31/2025            Total Time Calculated: 18 min              Spiritual Assessment began in [facility]        Referral/Consult From: Rounding   Encounter Overview/Reason: Behavioral Health  Service Provided For: Patient    Kirsty, Belief, Meaning:   Patient is connected with a kirsty tradition or spiritual practice  Family/Friends No family/friends present      Importance and Influence:  Patient has spiritual/personal beliefs that influence decisions regarding their health  Family/Friends No family/friends present    Community:  Patient expresses feelings of isolation: disconnected from family/friends and Other: Family lives in [place]; limited friends  Family/Friends No family/friends present    Assessment and Plan of Care:     Patient Interventions include: Facilitated expression of thoughts and feelings, Explored spiritual coping/struggle/distress, Affirmed coping skills/support systems, and Other: Cultivated relationship of care/trust; introduced pt to spiritual health care; pt shared that she had been attending a local Hindu [employer], but does not rely on her spiritual beliefs; pt could not identify any specific sources of strength; pt had no specific needs, but was appreciative of my visit and care.   Family/Friends Interventions include: No family/friends present    Patient Plan of Care: Spiritual Care available upon further referral  Family/Friends Plan of Care: Contact Kirsty  for support or sacramental needs    Electronically signed by GRETCHEN Alejandro on 1/31/2025 at 10:37 AM  For additional spiritual care, please contact the  on-call at ([phone]).    Melissa Burnett 
Intake  Physical Signs/Symptoms Outcomes: Biochemical Data, GI Status, Weight, Meal Time Behavior    Discharge Planning:    Continue current diet, Continue Oral Nutrition Supplement     Rere Myrick RD  Available via [employer]

## 2025-02-03 NOTE — BH NOTE
GROUP THERAPY PROGRESS NOTE    Patient did not participate in psychoeducation group. Handouts provided.    Katherine Gillies MSW, [facility]  
 GROUP THERAPY PROGRESS NOTE    Patient did not participate in psychoeducation group. Handouts provided.    Katherine Gillies MSW, [facility]  
 GROUP THERAPY PROGRESS NOTE    Patient did not participate in psychoeducation group. Handouts provided.    Katherine Gillies MSW, [facility]  
24 hour urine collection started    
[facility]  PSYCHIATRIC PROGRESS NOTE    Patient: Elvia Natarajan MRN: [id number]  SSN: [id number]    YOB: 1960  Age: 64 y.o.  Sex: female      Admit Date: 1/16/2025    Length of Stay: 8 Days    Legal Status: Vol    Chief Complaint: \"I'm  okay.\"     Interval History:   01/25/2025  Nursing report patient is taking her medications and eating her meals.  Elvia reports that indeed it is taking her a bit longer to eat, but is finishing her tray.  No side-effects from having increased the effexor. At the same time, she isn't sure if it has any benefit yet.  No SI.    01/24/2025  Nursing report patient is sleeping well. Patient says that she is worried, and feels the same as when she came in.  She is hydrating and eating well. At the same time she has passive death wish. Patient shares that she doesn't feel like there is much future for her and that things aren't going to get better.  No medication side-effects.    She was evaluated by GI, RUQ ultrasound ordered, as well as labs to check fecal pancreatic elastace. Cholecystyramine ordered.    01/23/2024  Nursing report patient sleeps well and is taking her medications.  She shares that she feels well supported and respected by staff here. She says that she doesn't have issues with sleep, but that she needs a significant amount to feel rested overall. She feels her motivation is low.  Elvia feels quite hopeless with passive death wish.  No SI or hi    She's had a duodenal switch surgery 'a number of years ago' in [place], which helped her lose a significant amount of weight. She's been experiencing further weight loss and fatigue over the past month as well as morales colored stools. She arranged for outpatient GI follow up, which was scheduled for today.    01/22/2025  Nursing report patient sleeping well. She shares that she is talking to her friend and her brother. They are supportive and they are glad that she is here. She reports experiencing passive 
[facility]  PSYCHIATRIC PROGRESS NOTE    Patient: Elvia Natarajan MRN: [id number]  SSN: [id number]    YOB: 1960  Age: 64 y.o.  Sex: female      Admit Date: 1/16/2025    Length of Stay: 14 Days    Legal Status: Vol    Chief Complaint: \"maybe better.     Interval History:   01/31/2025  Patient slept well. She did take trazodone as well, even though she tells the treatment team that she is 'a good sleeper.'  She shares that she is able to feel some benefit. She has no passive death wish.  Elvia is eating more of what she is getting.  No cr suicidal intent or plan.      01/30/2025  Nursing report patient slept well and is taking all her medications.  She is attending to her hygiene and eating all her meals.  Upon my evaluation patient remains flat, but does look brighter than her initial admission.  She reports that she feels hopeless as she does not feel like she is going to get any better.  Reflecting on her past improvement with medication regimen.  Behavioral changes, and proper nutrition she is able to see that she has something to look forward to.  She denies having SI or HI.    She denies experiencing any side effects from stimulant augmentation, at the same time she does not feel like it is effective just yet.    01/29/2025  Nursing report patient slept well and is taking her medication.  She has some thoughts that she would be better off dead, but no suicidal intent or plan. She describes feeling how hard it is to motivate or anything good to look forward to. At the same time she is able to appreciate that many good things have happened here, like checking on her liver, connecting her w/GI follow-up and nutrition, as well as starting a new medication regimen.    01/28/2025  Nursing report patient slept well and is taking her medications. Last night she took prn trazodone. She explains that she asks for it to help her.   GI saw her and are obtaining further labs to discern any occult 
[facility]  PSYCHIATRIC PROGRESS NOTE    Patient: Elvia Natarajan MRN: [id number]  SSN: [id number]    YOB: 1960  Age: 64 y.o.  Sex: female      Admit Date: 1/16/2025    Length of Stay: 1 Days    Legal Status: Vol    Chief Complaint: \"OK, I guess.\"     Interval History:   1/18/25- Elvia states she's OK today. Moods are \"subdued.\" She denies any recent SI, though still with PDW. Sleep was OK. Appetite is fair. Nursing reports pt still with poor hygiene she did agree to take a brief shower, but was not thoroughly. She has a headache, otherwise denies any adverse effects from medication changes.     Vitals:  Patient Vitals for the past 24 hrs:   Temp Pulse Resp BP SpO2   01/18/25 0900 97.9 °F (36.6 °C) 63 16 106/69 98 %   01/17/25 1528 98.1 °F (36.7 °C) 63 16 95/69 --   01/17/25 1237 97.9 °F (36.6 °C) 65 16 104/73 99 %     Labs:  Lab Results   Component Value Date/Time    WBC 6.3 01/16/2025 09:15 PM    HGBPOC 14.1 12/10/2021 06:36 AM    HGB 12.6 01/16/2025 09:15 PM    HCT 38.3 01/16/2025 09:15 PM     01/16/2025 09:15 PM    .9 01/16/2025 09:15 PM      Lab Results   Component Value Date/Time     01/16/2025 09:15 PM    K 4.2 01/16/2025 09:15 PM     01/16/2025 09:15 PM    CO2 27 01/16/2025 09:15 PM    BUN 10 01/16/2025 09:15 PM    GLOB 2.3 01/16/2025 09:15 PM     01/16/2025 09:15 PM      Scheduled Meds:   mirtazapine  7.5 mg Oral Nightly    venlafaxine  150 mg Oral Daily with breakfast     PRN Meds:  acetaminophen, polyethylene glycol, senna, aluminum & magnesium hydroxide-simethicone, hydrOXYzine HCl, haloperidol **OR** haloperidol lactate, diphenhydrAMINE, traZODone, traZODone    Mental Status Exam:  64 y.o. female who is moderately groomed, moderately engaged in evaluation.   Eye contact is fair  Psychomotor activity is WNL, no adventitious movements  Speech has normal rate, volume, rhythm  Mood is described as \"OK\"   Affect is mood-congruent, blunt and dysthymic 
[facility]  PSYCHIATRIC PROGRESS NOTE    Patient: Elvia Natarajan MRN: [id number]  SSN: [id number]    YOB: 1960  Age: 64 y.o.  Sex: female      Admit Date: 1/16/2025    Length of Stay: 3 Days    Legal Status: Vol    Chief Complaint: \"hard to say if different.\"     Interval History:   01/20/2025  Elvia reports feeling quite hopeless and not motivated. Her sleep is fair, so as her appetite. Patient did shower on Saturday and today has changed her clothes. Elvia says that she feels terrible about her life and feels like being dead is easier. She doesn't want to go through with a suicide attempt, but wants to get better.  She added that she is supposed to not take extended release medications because of her bariatric surgery. She is agreeable to switching to immediate release effexor.  No psychotic symptoms.    1/19/25- Elvia states she's about the same. She slept well last night. Stated mood is \"subdued.\" She denies any active SI but still with PDW. Calm and appropriate today.     1/18/25- Elvia states she's OK today. Moods are \"subdued.\" She denies any recent SI, though still with PDW. Sleep was OK. Appetite is fair. Nursing reports pt still with poor hygiene she did agree to take a brief shower, but was not thoroughly. She has a headache, otherwise denies any adverse effects from medication changes.     Vitals:  Patient Vitals for the past 24 hrs:   Temp Pulse Resp BP SpO2   01/20/25 0945 98.4 °F (36.9 °C) 65 16 98/64 99 %   01/20/25 0830 98.8 °F (37.1 °C) 85 16 (!) 85/56 99 %   01/19/25 1615 98.2 °F (36.8 °C) 50 16 100/67 98 %     Labs:  Lab Results   Component Value Date/Time    WBC 6.3 01/16/2025 09:15 PM    HGBPOC 14.1 12/10/2021 06:36 AM    HGB 12.6 01/16/2025 09:15 PM    HCT 38.3 01/16/2025 09:15 PM     01/16/2025 09:15 PM    .9 01/16/2025 09:15 PM      Lab Results   Component Value Date/Time     01/16/2025 09:15 PM    K 4.2 01/16/2025 09:15 PM     01/16/2025 09:15 
[facility]  PSYCHIATRIC PROGRESS NOTE    Patient: Elvia Natarajan MRN: [id number]  SSN: [id number]    YOB: 1960  Age: 64 y.o.  Sex: female      Admit Date: 1/16/2025    Length of Stay: 4 Days    Legal Status: Vol    Chief Complaint: \"hard to say if different.\"     Interval History:   01/21/2025  Patient says that she's been eating better here.  She felt no side-effects from switching effexor xr to immediate release.  Elvia isn't sure if depressive symptoms are getting better. She feels that her passive death wish is intermittent. She remarks that nights are worst for her.  Patient reports no past benefit with TMS or ECT.  No psychotic symptoms.    01/20/2025  Elvia reports feeling quite hopeless and not motivated. Her sleep is fair, so as her appetite. Patient did shower on Saturday and today has changed her clothes. Elvia says that she feels terrible about her life and feels like being dead is easier. She doesn't want to go through with a suicide attempt, but wants to get better.  She added that she is supposed to not take extended release medications because of her bariatric surgery. She is agreeable to switching to immediate release effexor.  No psychotic symptoms.    1/19/25- Elvia states she's about the same. She slept well last night. Stated mood is \"subdued.\" She denies any active SI but still with PDW. Calm and appropriate today.     1/18/25- Elvia states she's OK today. Moods are \"subdued.\" She denies any recent SI, though still with PDW. Sleep was OK. Appetite is fair. Nursing reports pt still with poor hygiene she did agree to take a brief shower, but was not thoroughly. She has a headache, otherwise denies any adverse effects from medication changes.     Vitals:  Patient Vitals for the past 24 hrs:   Temp Pulse Resp BP SpO2   01/21/25 0829 97.9 °F (36.6 °C) 57 16 100/67 100 %   01/20/25 2126 -- 63 -- 106/70 98 %   01/20/25 0945 98.4 °F (36.9 °C) 65 16 98/64 99 %     Labs:  Lab 
Behavioral Health Transition Record    Patient Name: Elvia Natarajan  YOB: 1960   Medical Record Number: [id number]  Date of Admission: 1/16/2025  7:43 PM   Date of Discharge: 2/3/2025    Attending Provider: No att. providers found   Discharging Provider: Dr. Julieta Bermeo    To contact this individual call [phone] and ask the  to page.  If unavailable, ask to be transferred to Behavioral Health Provider on call.  A Behavioral Health Provider will be available on call 24/7 and during holidays.    Primary Care Provider: Dima Hammonds MD    Allergies   Allergen Reactions    Sulfa Antibiotics      Other reaction(s): Unknown (comments)  Eyes turned bright red       Reason for Admission: CHIEF COMPLAINT: \"I'm just not myself.\"     HISTORY OF PRESENTING COMPLAINT:  Elvia Natarajan is a 64 y.o. White (non-) female who is currently admitted to the general side of 7th floor behavioral health Unit at [facility].      Elvia came to this inpatient acute setting upon recommendation of her outpatient psychiatric provider. She's been seeing Ms Stevenson for 2 months now; figures she's seen her 3-4 times.     Upon my evaluation, patient is subdued. She shares that it has been so long that she actually felt well. She feels that she's been more depressed. She describes feeling frozen in time, and shutting down. She hasn't been taking care of her hygiene, hasn't been eating as well. She's been thinking that she just a distinction in her mind that she just doesn't want to go on, but necessarily proactively take her life. She's experiencing signficant hopelessness and helplessness, difficulty making decisions.     No psychotic features. No jere.  Antique fire-arm that doesn't work    Admission Diagnosis: MDD (major depressive disorder), recurrent episode, mild (HCC) [F33.0]  Depression, unspecified depression type [F32.A]    * No surgery found *    Results for orders placed or performed during 
[facility]  PSYCHIATRIC PROGRESS NOTE    Patient: Elvia Natarajan MRN: [id number]  SSN: [id number]    YOB: 1960  Age: 64 y.o.  Sex: female      Admit Date: 1/16/2025    Length of Stay: 5 Days    Legal Status: Vol    Chief Complaint: \"I'm not sure.\"     Interval History:   01/22/2025  Nursing report patient sleeping well. She shares that she is talking to her friend and her brother. They are supportive and they are glad that she is here. She reports experiencing passive death wish, but intent or plan to end her life. She feels tired and unmotivated. She is afraid of the future and particularly overwhelmed.      01/21/2025  Patient says that she's been eating better here.  She felt no side-effects from switching effexor xr to immediate release.  Elvia isn't sure if depressive symptoms are getting better. She feels that her passive death wish is intermittent. She remarks that nights are worst for her.  Patient reports no past benefit with TMS or ECT.  No psychotic symptoms.    01/20/2025  Elvia reports feeling quite hopeless and not motivated. Her sleep is fair, so as her appetite. Patient did shower on Saturday and today has changed her clothes. Elvia says that she feels terrible about her life and feels like being dead is easier. She doesn't want to go through with a suicide attempt, but wants to get better.  She added that she is supposed to not take extended release medications because of her bariatric surgery. She is agreeable to switching to immediate release effexor.  No psychotic symptoms.    1/19/25- Elvia states she's about the same. She slept well last night. Stated mood is \"subdued.\" She denies any active SI but still with PDW. Calm and appropriate today.     1/18/25- Elvia states she's OK today. Moods are \"subdued.\" She denies any recent SI, though still with PDW. Sleep was OK. Appetite is fair. Nursing reports pt still with poor hygiene she did agree to take a brief shower, but was 
[facility]  PSYCHIATRIC PROGRESS NOTE    Patient: Elvia Natarajan MRN: [id number]  SSN: [id number]    YOB: 1960  Age: 64 y.o.  Sex: female      Admit Date: 1/16/2025    Length of Stay: 10 Days    Legal Status: Vol    Chief Complaint: \"I'm trying to do what I can.\"     Interval History:   01/27/2025  Nursing report patient slept well. She is taking her medications. She says that she is eating well, but this morning didn't do well.  She had a mild headache yesterday, but no other side-effects from medications. At the same time, she doesn't find the medications to be helpful and feels unmotivated.  No SI. No HI, but feels quite depressed.  No psychotic symptoms.    01/26/2025  Patient reports feeling the same and reflects about her despair prior to coming to hospital and thinking that dying was her option.  She feels that minding her overall medical picture is important.  Both energy and motivation are low. She feels that her BM s are loose and malodorous. She  also feels that she has bowel incontinence.      01/25/2025  Nursing report patient is taking her medications and eating her meals.  Elvia reports that indeed it is taking her a bit longer to eat, but is finishing her tray.  No side-effects from having increased the effexor. At the same time, she isn't sure if it has any benefit yet.  No SI.    01/24/2025  Nursing report patient is sleeping well. Patient says that she is worried, and feels the same as when she came in.  She is hydrating and eating well. At the same time she has passive death wish. Patient shares that she doesn't feel like there is much future for her and that things aren't going to get better.  No medication side-effects.    She was evaluated by GI, RUQ ultrasound ordered, as well as labs to check fecal pancreatic elastace. Cholecystyramine ordered.    01/23/2024  Nursing report patient sleeps well and is taking her medications.  She shares that she feels well supported and 
GROUP THERAPY PROGRESS NOTE    Patient did not participate in Coping Skills group.    Katherine Gillies MSW, [facility]  
GROUP THERAPY PROGRESS NOTE    Patient did not participate in Coping Skills group.    Katherine Gillies, MSW, [facility]  
GROUP THERAPY PROGRESS NOTE    Patient did not participate in Healthy living group.    Katherine Gillies MSW, [facility]    
GROUP THERAPY PROGRESS NOTE    Patient did not participate in Self-care group.    Katherine Gillies MSW, [email or web address]  
GROUP THERAPY PROGRESS NOTE    Patient did not participate in psychoeducation group.    Katherine Gillies MSW, [facility]    
GROUP THERAPY PROGRESS NOTE    Patient did not participate in psychoeducation group.    Katherine Gillies MSW, [facility]  
GROUP THERAPY PROGRESS NOTE    Patient did not participate in psychoeducation group.    Katherine Gillies MSW, [facility]  
GROUP THERAPY PROGRESS NOTE    Patient did not participate in recreational therapy group.    Katherine Gillies, MSW, [facility]    
GROUP THERAPY PROGRESS NOTE    Patient did not participate in recreational therapy group.    Katherine Gillies, MSW, [facility]  
GROUP THERAPY PROGRESS NOTE    Patient did not participate in recreational therapy group.    Katherine Gillies, MSW, [facility]    
GROUP THERAPY PROGRESS NOTE    Patient did not participate in recreational therapy group.    Katherine Gillies, MSW, [facility]  
GROUP THERAPY PROGRESS NOTE    Patient did not participate in self-care group.    Katherine Gillies MSW, [facility]  
GROUP THERAPY PROGRESS NOTE    Patient is participating in Healthy living group.    Group time: 30 minutes    Personal goal for participation: To gain an understanding Rumination and Worry     Goal orientation: Personal    Group therapy participation: Active     Therapeutic interventions reviewed and discussed:  Group members were guided through understanding the differences between worry and rumination. Members also watched a video about managing worry. Members gained an idea of the effects of overthinking. Members were supported in identifying reoccurring or intrusive thoughts and applying different strategies to adjust their thought patterns. Handouts provided.     Impression of participation:  Pt was present and engaged in group discussion and activity. Pt was calm, cooperative.       Katherine Gillies, MSW, [facility]  
GROUP THERAPY PROGRESS NOTE    Patient is participating in Psychoeducation group.    Group time: 60 minutes    Personal goal for participation: To watch various psychoeducation videos and engage in discussion.     Topic: 25 Mental Health Myths     Goal orientation: Personal    Group therapy participation: Active     Therapeutic interventions reviewed and discussed:  Group members were given opportunity to engage in conversation about various psychoeducation videos. Handouts also provided     Impression of participation:  Pt was present and engaged in group activity.      Katherine Gillies, MSW, [facility]      
GROUP THERAPY PROGRESS NOTE    Patient is participating in Recreation therapy group.    Group time: 30 minutes    Personal goal for participation: To engage in Blob activity.     Goal orientation: Personal    Group therapy participation:     Therapeutic interventions reviewed and discussed:  Group members were given the opportunity to learn about the Blob tree which is designed to help determine emotions and personality. This activity fosters introspection and self-awareness. Members were able to socialize and discuss their findings. Handout provided.     Impression of participation:       Katherine Gillies, MSW, [facility]      
GROUP THERAPY PROGRESS NOTE    Patient is participating in Self-care group.    Group time: 30 minutes    Personal goal for participation: To gain an understanding of the importance of self-awareness.    Goal orientation: Personal    Group therapy participation: Active     Therapeutic interventions reviewed and discussed:  Group members were guided through developing an understanding of how self-awareness contributes to our ability to cope with life stressors. Members given the opportunity to complete self-awareness assessment. VIDEO. Handouts provided    Impression of participation: Pt was present and engaged in group discussion. Pt was calm, cooperative      Katherine Gillies, MSW, [facility]    
GROUP THERAPY PROGRESS NOTE    Patient is participating in psychoeducation group.    Group time: 30 minutes    Personal goal for participation: to develop an understanding of Trust     Goal orientation: Personal    Group therapy participation:  Active     Therapeutic interventions reviewed and discussed:  Group members were guided through learning about B.R.A.V.I.N.G.which is an acronym for Trust.  Members gained an understanding of how trust may be established in small moments. Members watched Brian Lilly Talk about Trust and then engaged in discussion. Handouts provided    Impression of participation:  Pt was present and engaged in group discussion and activity.      Katherine Gillies, MSW, [facility]    
GROUP THERAPY PROGRESS NOTE    Patient is participating in recreational therapy group.    Group time: 30 minutes    Personal goal for participation: To engage in survival activity.    Goal orientation: Personal    Group therapy participation: Active        Therapeutic interventions reviewed and discussed:  Group members were given the opportunity to engage in the “[employer]: Survival on the Moon” activity. Members were encouraged to use critical thinking and problem-solving skills. Members interacted with peers and added perception and insight to discussion. Handout provided.     Impression of participation: Pt was present and engaged in group discussion/activity. Pt added insight to topic and interacted with peers. Pt was calm, cooperative.     Katherine Gillies, MSW, [facility]      
GROUP THERAPY PROGRESS NOTE    Patient is participating in recreational therapy group.    Group time: 30 minutes    Personal goal for participation: To engage in “finish the phrase” and other puzzle activities.    Goal orientation: Personal    Group therapy participation: passive     Therapeutic interventions reviewed and discussed:  Group members were given the opportunity to engage in the “finish the phrase” activity. Members were able to exercise socialization and memory skills. Members interacted with peers. Handout provided.     Impression of participation: SW provided pts with activity. SW encouraged pts to complete independently or in small groups.     Katherine Gillies, MSW, [facility]    
[facility]  PSYCHIATRIC PROGRESS NOTE    Patient: Elvia Natarajan MRN: [id number]  SSN: [id number]    YOB: 1960  Age: 64 y.o.  Sex: female      Admit Date: 1/16/2025    Length of Stay: 11 Days    Legal Status: Vol    Chief Complaint: \"I'm trying to do what I can.\"     Interval History:   01/28/2025  Nursing report patient slept well and is taking her medications. Last night she took prn trazodone. She explains that she asks for it to help her.   GI saw her and are obtaining further labs to discern any occult medical etiology. Appreciate their time and recs.  Upon my evaluation, patient presents with a little brighter. She doesn't feel her medications are effective, at the same time she denies having any negative side-effects.  No cr suicidal intent or plan.    01/27/2025  Nursing report patient slept well. She is taking her medications. She says that she is eating well, but this morning didn't do well.  She had a mild headache yesterday, but no other side-effects from medications. At the same time, she doesn't find the medications to be helpful and feels unmotivated.  No SI. No HI, but feels quite depressed.  No psychotic symptoms.    01/26/2025  Patient reports feeling the same and reflects about her despair prior to coming to hospital and thinking that dying was her option.  She feels that minding her overall medical picture is important.  Both energy and motivation are low. She feels that her BM s are loose and malodorous. She  also feels that she has bowel incontinence.      01/25/2025  Nursing report patient is taking her medications and eating her meals.  Elvia reports that indeed it is taking her a bit longer to eat, but is finishing her tray.  No side-effects from having increased the effexor. At the same time, she isn't sure if it has any benefit yet.  No SI.    01/24/2025  Nursing report patient is sleeping well. Patient says that she is worried, and feels the same as when she came 
[facility]  PSYCHIATRIC PROGRESS NOTE    Patient: Elvia Natarajan MRN: [id number]  SSN: [id number]    YOB: 1960  Age: 64 y.o.  Sex: female      Admit Date: 1/16/2025    Length of Stay: 16 Days    Legal Status: Vol    Chief Complaint: \"maybe better.     Interval History:   02/02/2025  Patient reports she continues to make progress. Remains tired but reports sleep and appetite are OK. Expresses lack of optimism about her future.  Denies SI/HI.  Denies psychotic symptoms.    02/01/2025  Patient reports she is OK. But still somewhat depressed. Eating better and acknowledges that she had become malnourished.  Energy remains at a low level.  Denies SI/HI.  Denies psychotic symptoms.    01/31/2025  Patient slept well. She did take trazodone as well, even though she tells the treatment team that she is 'a good sleeper.'  She shares that she is able to feel some benefit. She has no passive death wish.  Elvia is eating more of what she is getting.  No cr suicidal intent or plan.      01/30/2025  Nursing report patient slept well and is taking all her medications.  She is attending to her hygiene and eating all her meals.  Upon my evaluation patient remains flat, but does look brighter than her initial admission.  She reports that she feels hopeless as she does not feel like she is going to get any better.  Reflecting on her past improvement with medication regimen.  Behavioral changes, and proper nutrition she is able to see that she has something to look forward to.  She denies having SI or HI.    She denies experiencing any side effects from stimulant augmentation, at the same time she does not feel like it is effective just yet.    01/29/2025  Nursing report patient slept well and is taking her medication.  She has some thoughts that she would be better off dead, but no suicidal intent or plan. She describes feeling how hard it is to motivate or anything good to look forward to. At the same time she 
Lab draws attempted and were unsuccessful, labs retimed for tomorrow morning.   
Lab draws attempted by 2 nurses. Was successfully able to collect labs on the third attempt, but unable to obtain enough blood to fill all 6 lab containers. Notified phlebotomy to come collect remaining tubes needed. Phlebotomy stated that they will come to draw labs this morning.  
PRN Medication Documentation    Specific patient behavior that led to need for PRN medication: Difficulty sleeping  Staff interventions attempted prior to PRN being given: Promoting a quiet environment.   PRN medication given: Trazadone   Patient response/effectiveness of PRN medication: TL aware.    
PSYCHOSOCIAL ASSESSMENT  :Patient identifying info:   Elvia Natarajan is a 64 y.o., female admitted 2025  7:43 PM     Presenting problem and precipitating factors: 65 yo female presents at ED d/o \"mental health issues\". Pt denies SI or intent to hurt herself but states she would not stop someone else hurting her. Pt has hx of depression. Pt reports declining motivation for ADLs and states she hasn't showered in days. Pt reports sleeping excessively. Pt was encouraged to seek admission by her NP Lesley Stevenson. Pt denies HI, AHVH.     Mental status assessment: Pts mood is depressed and congruent with appearance. Pt is A&O x4. Pts speech and eye contact are WNL. Pt demonstrates no evidence of impairment. Pt has not been responding to internal stimuli.     Strengths/Recreation/Coping Skills:insured,  providers.     Collateral information:     Current psychiatric /substance abuse providers and contact info: Lesley Stevenson, E[facility]; Maria Fernanda Pickering LCSW     Previous psychiatric/substance abuse providers and response to treatment: no previous admissions.    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   Social History     Tobacco Use    Smoking status: Never    Smokeless tobacco: Never   Substance Use Topics    Alcohol use: No       History of biomedical complications associated with substance abuse: none     Patient's current acceptance of treatment or motivation for change: voluntary     Family constellation: single, no children; Parents , 1 brother ([place])    Is significant other involved? No     Describe support system: Friends, dog     Describe living arrangements and home environment: Pt lives alone     GUARDIAN/POA: No    Guardian Name:     Guardian Contact:     Health issues:     Trauma history: no     Legal issues: no     History of  service: no     Financial status: Employed    Hinduism/cultural factors: not assessed 
Per accepting [facility] Mijares, pt will not require a 1:1 upon transfer up to [facility]. Pt accepted to [facility] after morning shift change, when staffing/bed available.  
Pt attended AA group  
[facility]  PSYCHIATRIC PROGRESS NOTE    Patient: Elvia Natarajan MRN: [id number]  SSN: [id number]    YOB: 1960  Age: 64 y.o.  Sex: female      Admit Date: 1/16/2025    Length of Stay: 12 Days    Legal Status: Vol    Chief Complaint: \"going okay\"     Interval History:   01/29/2025  Nursing report patient slept well and is taking her medication.  She has some thoughts that she would be better off dead, but no suicidal intent or plan. She describes feeling how hard it is to motivate or anything good to look forward to. At the same time she is able to appreciate that many good things have happened here, like checking on her liver, connecting her w/GI follow-up and nutrition, as well as starting a new medication regimen.    01/28/2025  Nursing report patient slept well and is taking her medications. Last night she took prn trazodone. She explains that she asks for it to help her.   GI saw her and are obtaining further labs to discern any occult medical etiology. Appreciate their time and recs.  Upon my evaluation, patient presents with a little brighter. She doesn't feel her medications are effective, at the same time she denies having any negative side-effects.  No cr suicidal intent or plan.    01/27/2025  Nursing report patient slept well. She is taking her medications. She says that she is eating well, but this morning didn't do well.  She had a mild headache yesterday, but no other side-effects from medications. At the same time, she doesn't find the medications to be helpful and feels unmotivated.  No SI. No HI, but feels quite depressed.  No psychotic symptoms.    01/26/2025  Patient reports feeling the same and reflects about her despair prior to coming to hospital and thinking that dying was her option.  She feels that minding her overall medical picture is important.  Both energy and motivation are low. She feels that her BM s are loose and malodorous. She  also feels that she has bowel 
[facility]  PSYCHIATRIC PROGRESS NOTE    Patient: Elvia Natarajan MRN: [id number]  SSN: [id number]    YOB: 1960  Age: 64 y.o.  Sex: female      Admit Date: 1/16/2025    Length of Stay: 2 Days    Legal Status: Vol    Chief Complaint: \"OK, I guess.\"     Interval History:   1/19/25- Elvia states she's about the same. She slept well last night. Stated mood is \"subdued.\" She denies any active SI but still with PDW. Calm and appropriate today.     1/18/25- Elvia states she's OK today. Moods are \"subdued.\" She denies any recent SI, though still with PDW. Sleep was OK. Appetite is fair. Nursing reports pt still with poor hygiene she did agree to take a brief shower, but was not thoroughly. She has a headache, otherwise denies any adverse effects from medication changes.     Vitals:  Patient Vitals for the past 24 hrs:   Temp Pulse Resp BP SpO2   01/19/25 0915 98.4 °F (36.9 °C) 61 16 (!) 96/55 98 %   01/18/25 1637 99 °F (37.2 °C) 53 16 105/65 96 %   01/18/25 1206 98.4 °F (36.9 °C) 64 16 93/66 97 %     Labs:  Lab Results   Component Value Date/Time    WBC 6.3 01/16/2025 09:15 PM    HGBPOC 14.1 12/10/2021 06:36 AM    HGB 12.6 01/16/2025 09:15 PM    HCT 38.3 01/16/2025 09:15 PM     01/16/2025 09:15 PM    .9 01/16/2025 09:15 PM      Lab Results   Component Value Date/Time     01/16/2025 09:15 PM    K 4.2 01/16/2025 09:15 PM     01/16/2025 09:15 PM    CO2 27 01/16/2025 09:15 PM    BUN 10 01/16/2025 09:15 PM    GLOB 2.3 01/16/2025 09:15 PM     01/16/2025 09:15 PM      Scheduled Meds:   mirtazapine  15 mg Oral Nightly    venlafaxine  150 mg Oral Daily with breakfast     PRN Meds:  acetaminophen, polyethylene glycol, senna, aluminum & magnesium hydroxide-simethicone, hydrOXYzine HCl, haloperidol **OR** haloperidol lactate, diphenhydrAMINE, traZODone, traZODone    Mental Status Exam:  64 y.o. female who is moderately groomed, moderately engaged in evaluation.   Eye contact is 
[facility]  PSYCHIATRIC PROGRESS NOTE    Patient: Elvia Natarajan MRN: [id number]  SSN: [id number]    YOB: 1960  Age: 64 y.o.  Sex: female      Admit Date: 1/16/2025    Length of Stay: 15 Days    Legal Status: Vol    Chief Complaint: \"maybe better.     Interval History:   02/01/2025  Patient reports she is OK. But still somewhat depressed. Eating better and acknowledges that she had become malnourished.  Energy remains at a low level.  Denies SI/HI.  Denies psychotic symptoms.    01/31/2025  Patient slept well. She did take trazodone as well, even though she tells the treatment team that she is 'a good sleeper.'  She shares that she is able to feel some benefit. She has no passive death wish.  Elvia is eating more of what she is getting.  No cr suicidal intent or plan.      01/30/2025  Nursing report patient slept well and is taking all her medications.  She is attending to her hygiene and eating all her meals.  Upon my evaluation patient remains flat, but does look brighter than her initial admission.  She reports that she feels hopeless as she does not feel like she is going to get any better.  Reflecting on her past improvement with medication regimen.  Behavioral changes, and proper nutrition she is able to see that she has something to look forward to.  She denies having SI or HI.    She denies experiencing any side effects from stimulant augmentation, at the same time she does not feel like it is effective just yet.    01/29/2025  Nursing report patient slept well and is taking her medication.  She has some thoughts that she would be better off dead, but no suicidal intent or plan. She describes feeling how hard it is to motivate or anything good to look forward to. At the same time she is able to appreciate that many good things have happened here, like checking on her liver, connecting her w/GI follow-up and nutrition, as well as starting a new medication 
[facility]  PSYCHIATRIC PROGRESS NOTE    Patient: Elvia Natarajan MRN: [id number]  SSN: [id number]    YOB: 1960  Age: 64 y.o.  Sex: female      Admit Date: 1/16/2025    Length of Stay: 9 Days    Legal Status: Vol    Chief Complaint: \"I'm  about the same.\"     Interval History:   01/26/2025  Patient reports feeling the same and reflects about her despair prior to coming to hospital and thinking that dying was her option.  She feels that minding her overall medical picture is important.  Both energy and motivation are low. She feels that her BM s are loose and malodorous. She  also feels that she has bowel incontinence.      01/25/2025  Nursing report patient is taking her medications and eating her meals.  Elvia reports that indeed it is taking her a bit longer to eat, but is finishing her tray.  No side-effects from having increased the effexor. At the same time, she isn't sure if it has any benefit yet.  No SI.    01/24/2025  Nursing report patient is sleeping well. Patient says that she is worried, and feels the same as when she came in.  She is hydrating and eating well. At the same time she has passive death wish. Patient shares that she doesn't feel like there is much future for her and that things aren't going to get better.  No medication side-effects.    She was evaluated by GI, RUQ ultrasound ordered, as well as labs to check fecal pancreatic elastace. Cholecystyramine ordered.    01/23/2024  Nursing report patient sleeps well and is taking her medications.  She shares that she feels well supported and respected by staff here. She says that she doesn't have issues with sleep, but that she needs a significant amount to feel rested overall. She feels her motivation is low.  Elvia feels quite hopeless with passive death wish.  No SI or hi    She's had a duodenal switch surgery 'a number of years ago' in [place], which helped her lose a significant amount of weight. She's been experiencing 
Meds:   mirtazapine  30 mg Oral Nightly    cholestyramine light  4 g Oral Daily    venlafaxine  75 mg Oral Lunch    venlafaxine  150 mg Oral Daily    Vitamin D  1,000 Units Oral Daily    multivitamin  1 tablet Oral Daily    vitamin B-12  500 mcg Oral Daily     Mental Status Exam:  64 y.o. female who is moderately groomed, moderately engaged in evaluation.   Eye contact is fair  Psychomotor activity is WNL, no adventitious movements  Speech has normal rate, volume, rhythm  Mood is described as \"OK\"   Affect is mood-congruent, blunted and dysthymic   Perception is negative for AH, VH, paranoia, delusions   Thought process is linear and goal directed  Passive death wish.  Insight and judgment are poor    Assessment:   Elvia Natarajan meets criteria for a diagnosis of:  MDD, recurrent, severe, without psychosis    Plan:   01/24/2025  Discussed other options for treatment including ritalin as well as abilify. In the past ritalin helped with focus, energy and motivation. With abilify there is the option of HULL.  Will add TSH  Get EKG  Increase effexor at lunch from 75 to 150mg. Continue am 150 dose.    01/23/2025  GI consult, appreciate recs.  Increase remeron from 15 to 30mg po qhs  Continue effexor 150mg po qday and 75mg po qAfternoon.    01/22/2025  Thiamine level added to tomorrow am labs.  Increase effexor from 75mg po bid to 150mg po qday and 75mg po noon.  May augment w/antipsychotic.    01/21/2025  Continue current medication regimen.  Supplment w/D3 & multivitamin  Dietary consult    01/20/2025  Switch effexor xr 150 to immediate release 75mg po bid, continue remeron 15mg po qhs.    1/19/25- Cont current care     1/18/25- Continue the medication regimen as prescribed. Disposition planning to continue.     A coordinated, multidisplinary treatment team round was conducted with the patient, nurses, pharmacist,  and writer present. Discussions held with , and/or with family members; Complete 
described as \"OK\"   Affect is mood-congruent, blunted and dysthymic   Perception is negative for AH, VH, paranoia, delusions   Thought process is linear and goal directed  Passive death wish.  Insight and judgment are poor    Assessment:   Elvia Natarajan meets criteria for a diagnosis of:  MDD, recurrent, severe, without psychosis    Plan:   01/23/2025  GI consult, appreciate recs.  Increase remeron from 15 to 30mg po qhs  Continue effexor 150mg po qday and 75mg po qAfternoon.    01/22/2025  Thiamine level added to tomorrow am labs.  Increase effexor from 75mg po bid to 150mg po qday and 75mg po noon.  May augment w/antipsychotic.    01/21/2025  Continue current medication regimen.  Supplment w/D3 & multivitamin  Dietary consult    01/20/2025  Switch effexor xr 150 to immediate release 75mg po bid, continue remeron 15mg po qhs.    1/19/25- Cont current care     1/18/25- Continue the medication regimen as prescribed. Disposition planning to continue.     A coordinated, multidisplinary treatment team round was conducted with the patient, nurses, pharmacist,  and writer present. Discussions held with , and/or with family members; Complete current electronic health record for patient was reviewed in full including consultant notes, ancillary staff notes, nurses and tech notes, labs and vitals.    I certify that this patients inpatient psychiatric hospital services furnished since the previous certification were, and continue to be, required for treatment that could reasonably be expected to improve the patient's condition, or for diagnostic study, and that the patient continues to need, on a daily basis, active treatment furnished directly by or requiring the supervision of inpatient psychiatric facility personnel. In addition, the hospital records show that services furnished were intensive treatment services, admission or related services, or equivalent services.    
Lunch    venlafaxine  150 mg Oral Daily    Vitamin D  1,000 Units Oral Daily    multivitamin  1 tablet Oral Daily    vitamin B-12  500 mcg Oral Daily     Mental Status Exam:  64 y.o. female who is moderately groomed, moderately engaged in evaluation.   Eye contact is fair  Psychomotor activity is WNL, no adventitious movements  Speech has normal rate, volume, rhythm  Mood is described as \"OK\"   Affect is mood-congruent, blunted and dysthymic   Perception is negative for AH, VH, paranoia, delusions   Thought process is linear and goal directed  Passive death wish.  Insight and judgment are poor    Assessment:   Elvia Natarajan meets criteria for a diagnosis of:  MDD, recurrent, severe, without psychosis    Plan:   01/30/2025  Increase Ritalin from 2 0.5 mg p.o. twice daily to 5 mg p.o. twice daily  Continue all other prescribed medications.  Likely discharge early next week, with referral to GI outpatient to further workup issues.      01/29/2025  Start ritalin 2.5mg po bid  Continue remeron 45mg po qhs  Continue effexor 150gm po bid and abilify 5mg po qday  Continue abilify 5mg po qday    01/28/2025  Will augment with ritalin 2.5mg po bid  Increase remeron from 30 to 45mg po qhs  Continue effexor 150gm po bid and abilify 5mg po qday  Appreciate GI recs    01/27/3035  Increase abilify from 2 to 5mg po qday. Will order a one time abilify 3mg today. Thought is to move to HULL for better absorption.  Continue effexor and remeron.  Discussed further augmentation with stimulant  Advised patient to start a routine of getting ready for the day by taking care of hygiene, changing, and attending groups.  Appreciate GI recs    01/26/2025  GI will f/u Monday.  Augment with abilify 2mg po qday and thiamine 100mg po qday. Awaiting whole blood level lab to return.    01/25/2025  Continue current medication regimen.  Will start augmentation with abilify tomorrow.    01/24/2025  Discussed other options for treatment including ritalin as

## 2025-02-03 NOTE — DISCHARGE SUMMARY
DISCHARGE SUMMARY    Some parts of the discharge summary are from the initial Psychiatric interview that was done on admission by the admitting psychiatrist.     Date of Admission: 1/16/2025    Date of Discharge: 2/3/2025     TYPE OF DISCHARGE:   REGULAR -  YES    ADMISSION EVALUATION:  CHIEF COMPLAINT: \"I'm just not myself.\"     HISTORY OF PRESENTING COMPLAINT:  Elvia Natarajan is a 64 y.o. White (non-) female who is currently admitted to the general side of 7th floor behavioral health Unit at [facility].      Elvia came to this inpatient acute setting upon recommendation of her outpatient psychiatric provider. She's been seeing Ms Stevenson for 2 months now; figures she's seen her 3-4 times.     Upon my evaluation, patient is subdued. She shares that it has been so long that she actually felt well. She feels that she's been more depressed. She describes feeling frozen in time, and shutting down. She hasn't been taking care of her hygiene, hasn't been eating as well. She's been thinking that she just a distinction in her mind that she just doesn't want to go on, but necessarily proactively take her life. She's experiencing signficant hopelessness and helplessness, difficulty making decisions.     No psychotic features. No jere.  Antique fire-arm that doesn't work     PAST PSYCHIATRIC HISTORY   No past inpatient psychiatric admissions, last being  No suicide attempts     See winter perry, last seen a few days ago.  She's been on pristiq 50 since October.     SUBSTANCE USE HISTORY:  Tobacco: No  Cannabis: no  Alcohol: in past struggled 20 years.  IVD: no  No Cocaine/Heroin/amphetamines/LSD/PCP/Ketamine     Allergies:  Allergies         Allergies   Allergen Reactions    Sulfa Antibiotics         Other reaction(s): Unknown (comments)  Eyes turned bright red            PAST MEDICAL HISTORY:     Please see H&P for details.      Past Medical History        Past Medical History:   Diagnosis Date    Arthritis

## 2025-02-03 NOTE — DISCHARGE INSTRUCTIONS
DISCHARGE SUMMARY    NAME:Elvia Natarajan  : 1960  MRN: [id number]    The patient Elvia Natarajan exhibits the ability to control behavior in a less restrictive environment.  Patient's level of functioning is improving.  No assaultive/destructive behavior has been observed for the past 24 hours.  No suicidal/homicidal threat or behavior has been observed for the past 24 hours.  There is no evidence of serious medication side effects.  Patient has not been in physical or protective restraints for at least the past 24 hours.    If weapons involved, how are they secured? No weapons involved     Is patient aware of and in agreement with discharge plan? She is aware of discharge and is in agreement     Arrangements for medication:  Prescriptions filled at [place] for a 30 day supply     Copy of discharge instructions to provider?:  yes, fax to next provider    Arrangements for transportation home:  she has her car here at [place]     Keep all follow up appointments as scheduled, continue to take prescribed medications per physician instructions.  Mental health crisis number:  911 or your local mental health crisis line number at [phone]       Mental Health Emergency WARM LINE      [phone]-MH ([phone])      M-F: 9am to 9pm      Sat & Sun: 5pm - 9pm  National suicide prevention lines:                             3-257-KYHQRWY (1-444.813.6028)       4-871-740-TALK ([phone])    Crisis Text Line:  Text HOME to 694568

## 2025-02-03 NOTE — INTERDISCIPLINARY ROUNDS
Behavioral Health Interdisciplinary Rounds     Patient Name: Elvia Natarajan  Age: 64 y.o.  Room/Bed:  [facility]/  Primary Diagnosis: MDD (major depressive disorder), recurrent episode, mild (HCC)   Admission Status: Voluntary    Readmission within 30 days: No  Power of  in place: Yes  Patient requires a blocked bed: No          Reason for blocked bed: n/a  Sleep hours: 7+       Flu Vaccine: No  Participation in Care/Groups:  Yes  Medication Compliant?: Yes  PRNS (last 24 hours): None   Restraints (last 24 hours):  No  __________________________________________________  24 hour chart check complete: Yes    _______________________________________________    Patient goal(s) for today: meet with treatment team   Treatment team focus/goals: Plan for discharge today   Progress note: she denies SI , denies any issues with her medications, she wants to return to her job      Spiritual Care Consult: no  Financial concerns/prescription coverage:  medicaid   Family contact:                        Family requesting physician contact today:    Discharge plan: she will return home   Access to weapons :      no                                                        Outpatient provider(s):Whoa      LOS:  17  Expected LOS: today     Participating treatment team members: Elvia DelgadoMargaret SW- Dr. Zanoun - Allie Albright, PharmD. - Rere Weathers RN

## 2025-02-03 NOTE — PLAN OF CARE
Problem: Depression  Goal: Will be euthymic at discharge  Description: INTERVENTIONS:  1. Administer medication as ordered  2. Provide emotional support via 1:1 interaction with staff  3. Encourage involvement in milieu/groups/activities  4. Monitor for social isolation  2/2/2025 7910 by Whit Gruber, RN  Outcome: Progressing   PATIENT IS CURRENTLY RESTING IN BED. NO DISTRESS NOTED. SAFETY MEASURE IN PLACE. WILL CONTINUE TO MONITOR WITH Q15 MINUTE CHECKS.

## 2025-02-03 NOTE — PLAN OF CARE
Problem: Discharge Planning  Goal: Discharge to home or other facility with appropriate resources  Outcome: Progressing  Flowsheets (Taken 2/3/2025 6730)  Discharge to home or other facility with appropriate resources:   Identify barriers to discharge with patient and caregiver   Arrange for needed discharge resources and transportation as appropriate   Identify discharge learning needs (meds, wound care, etc)   Refer to discharge planning if patient needs post-hospital services based on physician order or complex needs related to functional status, cognitive ability or social support system

## 2025-02-09 LAB
A1AT PHENOTYP SERPL IFE: ABNORMAL
A1AT SERPL-MCNC: 91 MG/DL (ref 101–187)

## 2025-03-07 ENCOUNTER — HOSPITAL ENCOUNTER (EMERGENCY)
Facility: HOSPITAL | Age: 65
Discharge: PSYCHIATRIC OR SUBSTANCE USE FACILITY | End: 2025-03-08
Attending: EMERGENCY MEDICINE
Payer: MEDICAID

## 2025-03-07 DIAGNOSIS — R45.851 SUICIDAL IDEATION: Primary | ICD-10-CM

## 2025-03-07 DIAGNOSIS — F32.A DEPRESSION, UNSPECIFIED DEPRESSION TYPE: ICD-10-CM

## 2025-03-07 LAB
ALBUMIN SERPL-MCNC: 3.2 G/DL (ref 3.5–5)
ALBUMIN/GLOB SERPL: 1.1 (ref 1.1–2.2)
ALP SERPL-CCNC: 131 U/L (ref 45–117)
ALT SERPL-CCNC: 72 U/L (ref 12–78)
AMPHET UR QL SCN: NEGATIVE
ANION GAP SERPL CALC-SCNC: 12 MMOL/L (ref 2–12)
APAP SERPL-MCNC: <2 UG/ML (ref 10–30)
APPEARANCE UR: ABNORMAL
AST SERPL-CCNC: 101 U/L (ref 15–37)
BACTERIA URNS QL MICRO: NEGATIVE /HPF
BARBITURATES UR QL SCN: NEGATIVE
BASOPHILS # BLD: 0.08 K/UL (ref 0–0.1)
BASOPHILS NFR BLD: 0.7 % (ref 0–1)
BENZODIAZ UR QL: NEGATIVE
BILIRUB SERPL-MCNC: 0.9 MG/DL (ref 0.2–1)
BILIRUB UR QL CFM: NEGATIVE
BUN SERPL-MCNC: 9 MG/DL (ref 6–20)
BUN/CREAT SERPL: 15 (ref 12–20)
CALCIUM SERPL-MCNC: 8.2 MG/DL (ref 8.5–10.1)
CANNABINOIDS UR QL SCN: NEGATIVE
CHLORIDE SERPL-SCNC: 100 MMOL/L (ref 97–108)
CO2 SERPL-SCNC: 28 MMOL/L (ref 21–32)
COCAINE UR QL SCN: NEGATIVE
COLOR UR: ABNORMAL
COMMENT:: NORMAL
CREAT SERPL-MCNC: 0.59 MG/DL (ref 0.55–1.02)
DIFFERENTIAL METHOD BLD: ABNORMAL
EOSINOPHIL # BLD: 0.01 K/UL (ref 0–0.4)
EOSINOPHIL NFR BLD: 0.1 % (ref 0–7)
EPITH CASTS URNS QL MICRO: ABNORMAL /LPF
ERYTHROCYTE [DISTWIDTH] IN BLOOD BY AUTOMATED COUNT: 14 % (ref 11.5–14.5)
ETHANOL SERPL-MCNC: <10 MG/DL (ref 0–0.08)
GLOBULIN SER CALC-MCNC: 2.9 G/DL (ref 2–4)
GLUCOSE SERPL-MCNC: 140 MG/DL (ref 65–100)
GLUCOSE UR STRIP.AUTO-MCNC: NEGATIVE MG/DL
HCT VFR BLD AUTO: 44 % (ref 35–47)
HGB BLD-MCNC: 14.7 G/DL (ref 11.5–16)
HGB UR QL STRIP: NEGATIVE
HYALINE CASTS URNS QL MICRO: ABNORMAL /LPF (ref 0–5)
IMM GRANULOCYTES # BLD AUTO: 0.11 K/UL (ref 0–0.04)
IMM GRANULOCYTES NFR BLD AUTO: 0.9 % (ref 0–0.5)
KETONES UR QL STRIP.AUTO: ABNORMAL MG/DL
LEUKOCYTE ESTERASE UR QL STRIP.AUTO: ABNORMAL
LYMPHOCYTES # BLD: 0.75 K/UL (ref 0.8–3.5)
LYMPHOCYTES NFR BLD: 6.4 % (ref 12–49)
Lab: NORMAL
MCH RBC QN AUTO: 34.9 PG (ref 26–34)
MCHC RBC AUTO-ENTMCNC: 33.4 G/DL (ref 30–36.5)
MCV RBC AUTO: 104.5 FL (ref 80–99)
METHADONE UR QL: NEGATIVE
MONOCYTES # BLD: 0.35 K/UL (ref 0–1)
MONOCYTES NFR BLD: 3 % (ref 5–13)
NEUTS SEG # BLD: 10.4 K/UL (ref 1.8–8)
NEUTS SEG NFR BLD: 88.9 % (ref 32–75)
NITRITE UR QL STRIP.AUTO: NEGATIVE
NRBC # BLD: 0 K/UL (ref 0–0.01)
NRBC BLD-RTO: 0 PER 100 WBC
OPIATES UR QL: NEGATIVE
PCP UR QL: NEGATIVE
PH UR STRIP: 5.5 (ref 5–8)
PLATELET # BLD AUTO: 242 K/UL (ref 150–400)
PMV BLD AUTO: 10.7 FL (ref 8.9–12.9)
POTASSIUM SERPL-SCNC: 3.6 MMOL/L (ref 3.5–5.1)
PROT SERPL-MCNC: 6.1 G/DL (ref 6.4–8.2)
PROT UR STRIP-MCNC: 100 MG/DL
RBC # BLD AUTO: 4.21 M/UL (ref 3.8–5.2)
RBC #/AREA URNS HPF: ABNORMAL /HPF (ref 0–5)
RBC MORPH BLD: ABNORMAL
SALICYLATES SERPL-MCNC: <1.7 MG/DL (ref 2.8–20)
SODIUM SERPL-SCNC: 140 MMOL/L (ref 136–145)
SP GR UR REFRACTOMETRY: 1.02 (ref 1–1.03)
SPECIMEN HOLD: NORMAL
URINE CULTURE IF INDICATED: ABNORMAL
UROBILINOGEN UR QL STRIP.AUTO: 1 EU/DL (ref 0.2–1)
WBC # BLD AUTO: 11.7 K/UL (ref 3.6–11)
WBC URNS QL MICRO: ABNORMAL /HPF (ref 0–4)

## 2025-03-07 PROCEDURE — 82077 ASSAY SPEC XCP UR&BREATH IA: CPT

## 2025-03-07 PROCEDURE — 81001 URINALYSIS AUTO W/SCOPE: CPT

## 2025-03-07 PROCEDURE — 99285 EMERGENCY DEPT VISIT HI MDM: CPT

## 2025-03-07 PROCEDURE — 80307 DRUG TEST PRSMV CHEM ANLYZR: CPT

## 2025-03-07 PROCEDURE — 6370000000 HC RX 637 (ALT 250 FOR IP)

## 2025-03-07 PROCEDURE — 80143 DRUG ASSAY ACETAMINOPHEN: CPT

## 2025-03-07 PROCEDURE — 80053 COMPREHEN METABOLIC PANEL: CPT

## 2025-03-07 PROCEDURE — 6370000000 HC RX 637 (ALT 250 FOR IP): Performed by: EMERGENCY MEDICINE

## 2025-03-07 PROCEDURE — 85025 COMPLETE CBC W/AUTO DIFF WBC: CPT

## 2025-03-07 PROCEDURE — 80179 DRUG ASSAY SALICYLATE: CPT

## 2025-03-07 RX ORDER — LORAZEPAM 1 MG/1
1 TABLET ORAL
Status: COMPLETED | OUTPATIENT
Start: 2025-03-07 | End: 2025-03-07

## 2025-03-07 RX ORDER — ACETAMINOPHEN 325 MG/1
650 TABLET ORAL ONCE
Status: COMPLETED | OUTPATIENT
Start: 2025-03-07 | End: 2025-03-07

## 2025-03-07 RX ORDER — ACETAMINOPHEN 325 MG/1
650 TABLET ORAL
Status: COMPLETED | OUTPATIENT
Start: 2025-03-07 | End: 2025-03-07

## 2025-03-07 RX ORDER — CEPHALEXIN 500 MG/1
500 CAPSULE ORAL
Status: COMPLETED | OUTPATIENT
Start: 2025-03-07 | End: 2025-03-07

## 2025-03-07 RX ADMIN — ACETAMINOPHEN 650 MG: 325 TABLET ORAL at 23:54

## 2025-03-07 RX ADMIN — LORAZEPAM 1 MG: 1 TABLET ORAL at 18:10

## 2025-03-07 RX ADMIN — CEPHALEXIN 500 MG: 500 CAPSULE ORAL at 20:23

## 2025-03-07 RX ADMIN — ACETAMINOPHEN 650 MG: 325 TABLET ORAL at 16:12

## 2025-03-07 ASSESSMENT — LIFESTYLE VARIABLES
HOW MANY STANDARD DRINKS CONTAINING ALCOHOL DO YOU HAVE ON A TYPICAL DAY: PATIENT DOES NOT DRINK
HOW OFTEN DO YOU HAVE A DRINK CONTAINING ALCOHOL: NEVER

## 2025-03-07 ASSESSMENT — PAIN - FUNCTIONAL ASSESSMENT
PAIN_FUNCTIONAL_ASSESSMENT: NONE - DENIES PAIN
PAIN_FUNCTIONAL_ASSESSMENT: ACTIVITIES ARE NOT PREVENTED
PAIN_FUNCTIONAL_ASSESSMENT: NONE - DENIES PAIN

## 2025-03-07 ASSESSMENT — PAIN DESCRIPTION - ORIENTATION: ORIENTATION: MID

## 2025-03-07 ASSESSMENT — PAIN SCALES - GENERAL
PAINLEVEL_OUTOF10: 8
PAINLEVEL_OUTOF10: 6
PAINLEVEL_OUTOF10: 6
PAINLEVEL_OUTOF10: 0

## 2025-03-07 ASSESSMENT — PAIN DESCRIPTION - DESCRIPTORS: DESCRIPTORS: ACHING;DISCOMFORT

## 2025-03-07 ASSESSMENT — PAIN DESCRIPTION - LOCATION
LOCATION: HEAD
LOCATION: HEAD

## 2025-03-07 NOTE — ED PROVIDER NOTES
[facility] EMERGENCY DEPARTMENT  EMERGENCY DEPARTMENT ENCOUNTER      Pt Name: Elvia Natarajan  MRN: [id number]  Birthdate 1960  Date of evaluation: 3/7/2025  Provider: Minesh Nix PA-C    CHIEF COMPLAINT       Chief Complaint   Patient presents with    Suicidal    Depression         HISTORY OF PRESENT ILLNESS    Patient is a 64-year-old female with history of osteoarthritis, YULIANA, depression, who presents emergency room with reports of extreme depression.  Patient reports suicidal ideations without a plan.  Patient reports no homicidal ideation.  Patient was admitted in January for similar symptoms.  Patient reports that she was feeling better, but over the past week this thoughts came back.  Patient reports no change.  Patient reports taking her medications. Patient denies chest pain, shortness of breath, abdominal pain, urinary symptoms, nausea or vomiting, diarrhea or constipation, headache, dizziness, lightheadedness, fever or chills.  Patient denies alcohol use, denies smoking/vaping or illicit drug use.          Nursing Notes were reviewed.    REVIEW OF SYSTEMS       Review of Systems      PAST MEDICAL HISTORY     Past Medical History:   Diagnosis Date    Arthritis     OSTEO    Nausea & vomiting     YULIANA (obstructive sleep apnea)     on CPAP for last 3 y ears    Psychiatric disorder     DEPRESSION    Sleep apnea     uses c pap         SURGICAL HISTORY       Past Surgical History:   Procedure Laterality Date    GI      COLONOSCOPY MULTIPLE WITH REMOVAL OF BENIGN POLYPS    GYN      LT OOPHORECTOMY    ORTHOPEDIC SURGERY      LLT MENISCUS CONDROPLASTY    ORTHOPEDIC SURGERY      LT ROTATOR CUFF 2 TIMES RT ROTATOR 1 TIME    ORTHOPEDIC SURGERY      BILATERAL KNEE REPLACEMENTS    OVARY REMOVAL      Left ovary removed.    [place] UNLISTED PROCEDURE ABDOMEN PERITONEUM & OMENTUM  2018    ABDOMINAL - DUODENAL SWITCH, WITH CHOLECYSTECTOMY     SHOULDER ARTHROPLASTY Left     \"reverse procedure\"         CURRENT MEDICATIONS        Previous Medications    ARIPIPRAZOLE (ABILIFY) 5 MG TABLET    Take 1 tablet by mouth daily    CALCIUM ELEMENTAL (OSCAL) 500 MG TABS TABLET    Take 1 tablet by mouth 2 times daily    MIRTAZAPINE (REMERON) 45 MG TABLET    Take 1 tablet by mouth nightly    MULTIPLE VITAMIN (MULTIVITAMIN) TABS TABLET    Take 1 tablet by mouth in the morning and at bedtime    PANCRELIPASE, LIP-PROT-AMYL, (ZENPEP) [id number] UNITS CPEP    Take 2 capsules 3 times daily with meals and 1 capsule with snacks as needed.    THIAMINE 100 MG TABLET    Take 1 tablet by mouth daily    VENLAFAXINE (EFFEXOR) 75 MG TABLET    Take 2 tablets by mouth 2 times daily    VITAMIN B-12 500 MCG TABLET    Take 1 tablet by mouth daily    VITAMIN D (CHOLECALCIFEROL) 25 MCG (1000 UT) TABS TABLET    Take 3 tablets by mouth daily       ALLERGIES     Sulfa antibiotics    FAMILY HISTORY       Family History   Problem Relation Age of Onset    Psychiatric Disorder Mother     Heart Disease Father           SOCIAL HISTORY       Social History     Socioeconomic History    Marital status: Single     Spouse name: None    Number of children: None    Years of education: None    Highest education level: None   Tobacco Use    Smoking status: Never    Smokeless tobacco: Never   Substance and Sexual Activity    Alcohol use: No    Drug use: Not Currently     Social Determinants of Health     Food Insecurity: No Food Insecurity (1/17/2025)    Hunger Vital Sign     Worried About Running Out of Food in the Last Year: Never true     Ran Out of Food in the Last Year: Never true   Transportation Needs: No Transportation Needs (1/17/2025)    PRAPARE - Transportation     Lack of Transportation (Medical): No     Lack of Transportation (Non-Medical): No   Housing Stability: Low Risk  (1/17/2025)    Housing Stability Vital Sign     Unable to Pay for Housing in the Last Year: No     Number of Times Moved in the Last Year: 1     Homeless in the Last Year: No       PHYSICAL EXAM

## 2025-03-07 NOTE — BSMART NOTE
Comprehensive Assessment Form Part 1      Section I - Disposition    Major Depressive Disorder, severe, without psychosis   Past Medical History:   Diagnosis Date    Arthritis     OSTEO    Nausea & vomiting     YULIANA (obstructive sleep apnea)     on CPAP for last 3 y ears    Psychiatric disorder     DEPRESSION    Sleep apnea     uses c pap        The Medical Doctor to Psychiatrist conference was notcompleted.  The Medical Doctor is in agreement with Psychiatrist disposition because of (reason) n/a.  The plan is admit to inpatient behavioral health unit  The on-call Psychiatrist consulted was  N/a   The admitting Psychiatrist will be Dr. HENRY.  The admitting Diagnosis is MDD  The Payor source is [facility]     This writer reviewed the Hunt Suicide Severity Rating Scale in nursing flowsheet and the risk level assigned is moderate risk.  Based on this assessment, the risk of suicide is moderate risk and the plan is admit to inpatient behavioral health unit.     Section II - Integrated Summary  Summary:  Per triage, \"Pt c/o SI with no plan. Pt reports extreme depression. Denies HI.\"     Patient is a 64 year old female that was seen in the emergency department at [facility]. This writer met with patient face to face. Introduced self and role. Pt provided consent to complete this assessment. She was alert, oriented and cooperative. Presented with depressed mood. Affect was sad. No aggression or violence, or symptoms of psychosis. Pt stated, \"I just don't feel right.\" Pt report's a headache. Pt states feeling \"out of control\" of her life. Pt is unable to manage her finances, keep her place tidy or have the motivation to shower. Pt shared she has not felt well in \"a while.\" She fought herself for coming back to the hospital. Pt reported a history of depression. She had ECT treatments completed in 2016 but states they were unsuccessful. Pt lives alone, is single and has no children. She

## 2025-03-07 NOTE — BSMART NOTE
BSMART assessment completed, and suicide risk level noted to be moderate risk. Primary Nurse ROSA Stover and Charge Nurse and Physician notified. Concerns not observed.     Linda Dominguez LCSW

## 2025-03-07 NOTE — ED NOTES
Patient changed into green gown. Belongings secured at nurse's station in locked cabinet on Shel[address]. Sitter at bedside.

## 2025-03-08 ENCOUNTER — HOSPITAL ENCOUNTER (INPATIENT)
Facility: HOSPITAL | Age: 65
LOS: 6 days | Discharge: HOME | End: 2025-03-14
Attending: PSYCHIATRY & NEUROLOGY | Admitting: PSYCHIATRY & NEUROLOGY
Payer: MEDICAID

## 2025-03-08 VITALS
BODY MASS INDEX: 19.38 KG/M2 | DIASTOLIC BLOOD PRESSURE: 81 MMHG | TEMPERATURE: 98.2 F | HEART RATE: 93 BPM | OXYGEN SATURATION: 95 % | WEIGHT: 120 LBS | SYSTOLIC BLOOD PRESSURE: 111 MMHG | RESPIRATION RATE: 17 BRPM

## 2025-03-08 PROCEDURE — 1240000000 HC EMOTIONAL WELLNESS R&B

## 2025-03-08 PROCEDURE — 6370000000 HC RX 637 (ALT 250 FOR IP): Performed by: NURSE PRACTITIONER

## 2025-03-08 PROCEDURE — 6370000000 HC RX 637 (ALT 250 FOR IP): Performed by: STUDENT IN AN ORGANIZED HEALTH CARE EDUCATION/TRAINING PROGRAM

## 2025-03-08 RX ORDER — HALOPERIDOL 5 MG/1
2.5 TABLET ORAL EVERY 4 HOURS PRN
Status: DISCONTINUED | OUTPATIENT
Start: 2025-03-08 | End: 2025-03-09

## 2025-03-08 RX ORDER — ACETAMINOPHEN 325 MG/1
650 TABLET ORAL ONCE
Status: COMPLETED | OUTPATIENT
Start: 2025-03-08 | End: 2025-03-08

## 2025-03-08 RX ORDER — POLYETHYLENE GLYCOL 3350 17 G/17G
17 POWDER, FOR SOLUTION ORAL DAILY PRN
Status: DISCONTINUED | OUTPATIENT
Start: 2025-03-08 | End: 2025-03-09

## 2025-03-08 RX ORDER — ACETAMINOPHEN 325 MG/1
650 TABLET ORAL EVERY 4 HOURS PRN
Status: DISCONTINUED | OUTPATIENT
Start: 2025-03-08 | End: 2025-03-09

## 2025-03-08 RX ORDER — DIPHENHYDRAMINE HYDROCHLORIDE 50 MG/ML
25 INJECTION, SOLUTION INTRAMUSCULAR; INTRAVENOUS EVERY 4 HOURS PRN
Status: DISCONTINUED | OUTPATIENT
Start: 2025-03-08 | End: 2025-03-09

## 2025-03-08 RX ORDER — LORAZEPAM 1 MG/1
1 TABLET ORAL ONCE
Status: COMPLETED | OUTPATIENT
Start: 2025-03-08 | End: 2025-03-08

## 2025-03-08 RX ORDER — HALOPERIDOL 5 MG/ML
2.5 INJECTION INTRAMUSCULAR EVERY 4 HOURS PRN
Status: DISCONTINUED | OUTPATIENT
Start: 2025-03-08 | End: 2025-03-09

## 2025-03-08 RX ORDER — MAGNESIUM HYDROXIDE/ALUMINUM HYDROXICE/SIMETHICONE 120; 1200; 1200 MG/30ML; MG/30ML; MG/30ML
30 SUSPENSION ORAL EVERY 6 HOURS PRN
Status: DISCONTINUED | OUTPATIENT
Start: 2025-03-08 | End: 2025-03-09

## 2025-03-08 RX ORDER — ACETAMINOPHEN 325 MG/1
650 TABLET ORAL
Status: DISCONTINUED | OUTPATIENT
Start: 2025-03-08 | End: 2025-03-08 | Stop reason: HOSPADM

## 2025-03-08 RX ORDER — ARIPIPRAZOLE 5 MG/1
5 TABLET ORAL ONCE
Status: COMPLETED | OUTPATIENT
Start: 2025-03-08 | End: 2025-03-08

## 2025-03-08 RX ADMIN — LORAZEPAM 1 MG: 1 TABLET ORAL at 15:20

## 2025-03-08 RX ADMIN — ACETAMINOPHEN 650 MG: 325 TABLET ORAL at 10:08

## 2025-03-08 RX ADMIN — ARIPIPRAZOLE 5 MG: 5 TABLET ORAL at 15:19

## 2025-03-08 RX ADMIN — LORAZEPAM 1 MG: 1 TABLET ORAL at 10:08

## 2025-03-08 RX ADMIN — ACETAMINOPHEN 650 MG: 325 TABLET ORAL at 21:08

## 2025-03-08 RX ADMIN — ALUMINUM HYDROXIDE, MAGNESIUM HYDROXIDE, AND SIMETHICONE 30 ML: 200; 200; 20 SUSPENSION ORAL at 21:22

## 2025-03-08 ASSESSMENT — PAIN SCALES - WONG BAKER: WONGBAKER_NUMERICALRESPONSE: NO HURT

## 2025-03-08 ASSESSMENT — PAIN SCALES - GENERAL
PAINLEVEL_OUTOF10: 0
PAINLEVEL_OUTOF10: 7
PAINLEVEL_OUTOF10: 7

## 2025-03-08 ASSESSMENT — LIFESTYLE VARIABLES
HOW OFTEN DO YOU HAVE A DRINK CONTAINING ALCOHOL: MONTHLY OR LESS
HOW MANY STANDARD DRINKS CONTAINING ALCOHOL DO YOU HAVE ON A TYPICAL DAY: 1 OR 2

## 2025-03-08 ASSESSMENT — PAIN DESCRIPTION - ORIENTATION
ORIENTATION: MID
ORIENTATION: MID;OTHER (COMMENT)

## 2025-03-08 ASSESSMENT — SLEEP AND FATIGUE QUESTIONNAIRES
AVERAGE NUMBER OF SLEEP HOURS: 9
DO YOU USE A SLEEP AID: YES
SLEEP PATTERN: DIFFICULTY FALLING ASLEEP
DO YOU HAVE DIFFICULTY SLEEPING: NO

## 2025-03-08 ASSESSMENT — PAIN DESCRIPTION - DESCRIPTORS
DESCRIPTORS: ACHING
DESCRIPTORS: ACHING

## 2025-03-08 ASSESSMENT — PAIN DESCRIPTION - LOCATION
LOCATION: HEAD
LOCATION: HEAD

## 2025-03-08 ASSESSMENT — PAIN - FUNCTIONAL ASSESSMENT: PAIN_FUNCTIONAL_ASSESSMENT: ACTIVITIES ARE NOT PREVENTED

## 2025-03-08 NOTE — ED NOTES
ED SIGN OUT NOTE  Care assumed at [facility] 7:03 AM EST    Patient was signed out to me by Dr. Bynum.     Patient is awaiting bed placement for psychiatric admission. History of osteoarthritis, depression, YULIANA on CPAP who presented for suicidal ideation. Medically cleared. Currently no bed available to manage patient and CPAP. Awaiting bed placement.     /81   Pulse 93   Temp 98.2 °F (36.8 °C) (Oral)   Resp 17   Wt 54.4 kg (120 lb)   SpO2 95%   BMI 19.38 kg/m²     Labs Reviewed   CBC WITH AUTO DIFFERENTIAL - Abnormal; Notable for the following components:       Result Value    WBC 11.7 (*)     .5 (*)     MCH 34.9 (*)     Neutrophils % 88.9 (*)     Lymphocytes % 6.4 (*)     Monocytes % 3.0 (*)     Immature Granulocytes % 0.9 (*)     Neutrophils Absolute 10.40 (*)     Lymphocytes Absolute 0.75 (*)     Immature Granulocytes Absolute 0.11 (*)     All other components within normal limits   COMPREHENSIVE METABOLIC PANEL - Abnormal; Notable for the following components:    Glucose 140 (*)     Calcium 8.2 (*)      (*)     Alk Phosphatase 131 (*)     Total Protein 6.1 (*)     Albumin 3.2 (*)     All other components within normal limits   URINALYSIS WITH REFLEX TO CULTURE - Abnormal; Notable for the following components:    Appearance CLOUDY (*)     Protein,  (*)     Ketones, Urine TRACE (*)     Leukocyte Esterase, Urine TRACE (*)     All other components within normal limits   ACETAMINOPHEN LEVEL - Abnormal; Notable for the following components:    Acetaminophen Level <2 (*)     All other components within normal limits   SALICYLATE LEVEL - Abnormal; Notable for the following components:    Salicylate Lvl <1.7 (*)     All other components within normal limits   ETHANOL   URINE DRUG SCREEN   EXTRA TUBES HOLD   BILIRUBIN, CONFIRMATORY     No orders to display            Diagnosis:   1. Suicidal ideation    2. Depression, unspecified depression type        Disposition:   Decision To  Transfer 03/08/2025 03:50:16 PM      MD Tameka King, Candelaria Dunn MD  03/08/25 7751

## 2025-03-08 NOTE — CONSULTS
Department of Psychiatry  Attending Consult Note        Reason for Consult:  SI  Requesting Physician:  Jeffery    IDENTIFYING DATA:          The patient is a 64 y.o. female with a medical history of SI who presents with continuing SI, depressed mood.    HISTORY OF PRESENT ILLNESS:  This is a 63 yo female recently inpatient at [facility] in January 2025. States that she has continued SI but no plan at this time. States she is feeling hopeless and helpless. Lives alone, little support. Reports continued med compliance at home.    No current facility-administered medications for this encounter.    Past Psychiatric History:  inpatient January 2025 [facility]    Family Psychiatric History:  unknown    Drug and Alcohol History:  denies  Urine Drug Screen: No components found for: \"IAMMENTA\", \"IBARBIT\", \"IBENZO\", \"ICOCAINE\", \"IMARTHC\", \"IOPIATES\", \"IPHENCYC\"    PAST MEDICAL HISTORY      Diagnosis Date    Arthritis     OSTEO    Nausea & vomiting     YULIANA (obstructive sleep apnea)     on CPAP for last 3 y ears    Psychiatric disorder     DEPRESSION    Sleep apnea     uses c pap     PAST SURGICAL HISTORY      Procedure Laterality Date    GI      COLONOSCOPY MULTIPLE WITH REMOVAL OF BENIGN POLYPS    GYN      LT OOPHORECTOMY    ORTHOPEDIC SURGERY      LLT MENISCUS CONDROPLASTY    ORTHOPEDIC SURGERY      LT ROTATOR CUFF 2 TIMES RT ROTATOR 1 TIME    ORTHOPEDIC SURGERY      BILATERAL KNEE REPLACEMENTS    OVARY REMOVAL      Left ovary removed.    [place] UNLISTED PROCEDURE ABDOMEN PERITONEUM & OMENTUM  2018    ABDOMINAL - DUODENAL SWITCH, WITH CHOLECYSTECTOMY     SHOULDER ARTHROPLASTY Left     \"reverse procedure\"       Allergies:  Sulfa antibiotics    Family History:       Problem Relation Age of Onset    Psychiatric Disorder Mother     Heart Disease Father        REVIEW OF SYSTEMS:    See primary service's resident's note.    PHYSICAL EXAM:    Vitals:  /81   Pulse 100   Temp 98.4 °F (36.9 °C) (Oral)   Resp 21   Wt 54.4 kg (120  lb)   SpO2 95%   BMI 19.38 kg/m²     Mental Status Examination:  Sleeping, wakes easily. Unkempt, good EC. Speech fluent. Reports SI but denies current plan. States that she is able to keep herself safe while in ER. States no AVH. TP is linear and logical.    DATA:  Labs:  CBC with Differential:    Lab Results   Component Value Date/Time    WBC 11.7 03/07/2025 03:02 PM    RBC 4.21 03/07/2025 03:02 PM    HGB 14.7 03/07/2025 03:02 PM    HCT 44.0 03/07/2025 03:02 PM     03/07/2025 03:02 PM    .5 03/07/2025 03:02 PM    MCH 34.9 03/07/2025 03:02 PM    MCHC 33.4 03/07/2025 03:02 PM    RDW 14.0 03/07/2025 03:02 PM    NRBC 0.0 03/07/2025 03:02 PM    NRBC 0.00 03/07/2025 03:02 PM    LYMPHOPCT 6.4 03/07/2025 03:02 PM    MONOPCT 3.0 03/07/2025 03:02 PM    EOSPCT 0.1 03/07/2025 03:02 PM    BASOPCT 0.7 03/07/2025 03:02 PM    MONOSABS 0.35 03/07/2025 03:02 PM    LYMPHSABS 0.75 03/07/2025 03:02 PM    EOSABS 0.01 03/07/2025 03:02 PM    BASOSABS 0.08 03/07/2025 03:02 PM    DIFFTYPE SMEAR SCANNED 03/07/2025 03:02 PM     CMP:    Lab Results   Component Value Date/Time     03/07/2025 03:02 PM    K 3.6 03/07/2025 03:02 PM     03/07/2025 03:02 PM    CO2 28 03/07/2025 03:02 PM    BUN 9 03/07/2025 03:02 PM    CREATININE 0.59 03/07/2025 03:02 PM    LABGLOM >90 03/07/2025 03:02 PM    GLUCOSE 140 03/07/2025 03:02 PM    CALCIUM 8.2 03/07/2025 03:02 PM    BILITOT 0.9 03/07/2025 03:02 PM    ALKPHOS 131 03/07/2025 03:02 PM     03/07/2025 03:02 PM    ALT 72 03/07/2025 03:02 PM       DSM-IV DIAGNOSIS:      Impression (Axis I):  MDD recurrent severe without psychosis      PLAN:    Pt meets criteria for inpatient admission to U but uses CPAP so limited options for bed. Will discuss bed availability with BSMART and access. Pt remains voluntary for admission.

## 2025-03-08 NOTE — BSMART NOTE
This writer rounded on patient.  Patient agreed to talk with this writer and was informed of counselor's role.    The patient's appearance is unkempt.  The patient's behavior shows no evidence of impairment. The patient is oriented to time, place, person and situation.  The patient's speech shows no evidence of impairment.  The patient's mood  is depressed and is anxious.  The range of affect is flat.  The patient's thought content  demonstrates no evidence of impairment.  The thought process shows no evidence of impairment.  The patient's perception shows no evidence of impairment. The patient's memory shows no evidence of impairment.  The patient's appetite shows no evidence of impairment.  The patient's sleep shows no evidence of impairment. The patient's insight shows no evidence of impairment.  The patient's judgement is psychologically impaired.  Patient endorses suicidal and/or homicidal ideation.   The plan is continue with plan for inpatient admission    Patient has been accepted by Dr Monae to [facility] Room 322/2 on General side Report can be called to [phone].

## 2025-03-08 NOTE — BSMART NOTE
Informed by Access currently no bed available to manage patient and cpap. Patient will hold for [facility] admission.

## 2025-03-08 NOTE — ED NOTES
Introduced self to patient. Vital signs were monitored. Offered food this time. Informed regarding waiting of room.

## 2025-03-08 NOTE — BSMART NOTE
Patient round attempted. Patient was currently resting but writer and medical staff report no concerns throughout shift. Patient rested during the night and engaged minimally with staff.

## 2025-03-08 NOTE — ED NOTES
3:00 PM  Change of shift. Care of patient taken over from Dr. English pending behavioral health placement.    3:51 PM  Patient accepted at [facility] for psychiatric treatment.      Lisa Metcalf DO  03/08/25 0056

## 2025-03-08 NOTE — ED NOTES
TRANSFER - OUT REPORT:    Verbal report given to Sandi Dover RN on Elvia Natarajan  being transferred to [facility] 322-2 for routine progression of patient care       Report consisted of patient's Situation, Background, Assessment and   Recommendations(SBAR).     Information from the following report(s) ED SBAR, MAR, Recent Results, and Neuro Assessment was reviewed with the receiving nurse.    Douglas Fall Assessment:    Presents to emergency department  because of falls (Syncope, seizure, or loss of consciousness): No  Age > 70: No  Altered Mental Status, Intoxication with alcohol or substance confusion (Disorientation, impaired judgment, poor safety awaremess, or inability to follow instructions): No  Impaired Mobility: Ambulates or transfers with assistive devices or assistance; Unable to ambulate or transer.: No  Nursing Judgement: No          Lines:       Opportunity for questions and clarification was provided.      Patient transported with:  [employer] transport

## 2025-03-09 PROCEDURE — 6370000000 HC RX 637 (ALT 250 FOR IP)

## 2025-03-09 PROCEDURE — 6370000000 HC RX 637 (ALT 250 FOR IP): Performed by: NURSE PRACTITIONER

## 2025-03-09 PROCEDURE — 1240000000 HC EMOTIONAL WELLNESS R&B

## 2025-03-09 PROCEDURE — 6370000000 HC RX 637 (ALT 250 FOR IP): Performed by: INTERNAL MEDICINE

## 2025-03-09 RX ORDER — DIPHENHYDRAMINE HYDROCHLORIDE 50 MG/ML
50 INJECTION, SOLUTION INTRAMUSCULAR; INTRAVENOUS EVERY 4 HOURS PRN
Status: DISCONTINUED | OUTPATIENT
Start: 2025-03-09 | End: 2025-03-14 | Stop reason: HOSPADM

## 2025-03-09 RX ORDER — MAGNESIUM HYDROXIDE/ALUMINUM HYDROXICE/SIMETHICONE 120; 1200; 1200 MG/30ML; MG/30ML; MG/30ML
30 SUSPENSION ORAL EVERY 6 HOURS PRN
Status: DISCONTINUED | OUTPATIENT
Start: 2025-03-09 | End: 2025-03-14 | Stop reason: HOSPADM

## 2025-03-09 RX ORDER — TRAZODONE HYDROCHLORIDE 50 MG/1
50 TABLET ORAL NIGHTLY PRN
Status: DISCONTINUED | OUTPATIENT
Start: 2025-03-09 | End: 2025-03-10

## 2025-03-09 RX ORDER — HALOPERIDOL 5 MG/1
5 TABLET ORAL EVERY 4 HOURS PRN
Status: DISCONTINUED | OUTPATIENT
Start: 2025-03-09 | End: 2025-03-14 | Stop reason: HOSPADM

## 2025-03-09 RX ORDER — MULTIVITAMIN WITH IRON
1 TABLET ORAL DAILY
Status: DISCONTINUED | OUTPATIENT
Start: 2025-03-10 | End: 2025-03-14 | Stop reason: HOSPADM

## 2025-03-09 RX ORDER — VITAMIN B COMPLEX
1000 TABLET ORAL DAILY
Status: DISCONTINUED | OUTPATIENT
Start: 2025-03-10 | End: 2025-03-14 | Stop reason: HOSPADM

## 2025-03-09 RX ORDER — VENLAFAXINE 100 MG/1
150 TABLET ORAL 2 TIMES DAILY WITH MEALS
Status: DISCONTINUED | OUTPATIENT
Start: 2025-03-09 | End: 2025-03-14 | Stop reason: HOSPADM

## 2025-03-09 RX ORDER — MIRTAZAPINE 15 MG/1
45 TABLET, FILM COATED ORAL NIGHTLY PRN
Status: DISCONTINUED | OUTPATIENT
Start: 2025-03-09 | End: 2025-03-10

## 2025-03-09 RX ORDER — ACETAMINOPHEN 325 MG/1
650 TABLET ORAL EVERY 4 HOURS PRN
Status: DISCONTINUED | OUTPATIENT
Start: 2025-03-09 | End: 2025-03-14 | Stop reason: HOSPADM

## 2025-03-09 RX ORDER — POLYETHYLENE GLYCOL 3350 17 G/17G
17 POWDER, FOR SOLUTION ORAL DAILY PRN
Status: DISCONTINUED | OUTPATIENT
Start: 2025-03-09 | End: 2025-03-14 | Stop reason: HOSPADM

## 2025-03-09 RX ORDER — IBUPROFEN 600 MG/1
600 TABLET, FILM COATED ORAL ONCE
Status: COMPLETED | OUTPATIENT
Start: 2025-03-09 | End: 2025-03-09

## 2025-03-09 RX ORDER — METHYLPHENIDATE HYDROCHLORIDE 5 MG/1
5 TABLET ORAL 2 TIMES DAILY
Status: ON HOLD | COMMUNITY
End: 2025-03-09 | Stop reason: ALTCHOICE

## 2025-03-09 RX ORDER — HYDROXYZINE HYDROCHLORIDE 25 MG/1
50 TABLET, FILM COATED ORAL 3 TIMES DAILY PRN
Status: DISCONTINUED | OUTPATIENT
Start: 2025-03-09 | End: 2025-03-14 | Stop reason: HOSPADM

## 2025-03-09 RX ORDER — ARIPIPRAZOLE 5 MG/1
10 TABLET ORAL DAILY
Status: DISCONTINUED | OUTPATIENT
Start: 2025-03-09 | End: 2025-03-14 | Stop reason: HOSPADM

## 2025-03-09 RX ORDER — HALOPERIDOL 5 MG/ML
5 INJECTION INTRAMUSCULAR EVERY 4 HOURS PRN
Status: DISCONTINUED | OUTPATIENT
Start: 2025-03-09 | End: 2025-03-14 | Stop reason: HOSPADM

## 2025-03-09 RX ADMIN — HYDROXYZINE HYDROCHLORIDE 50 MG: 25 TABLET, FILM COATED ORAL at 10:30

## 2025-03-09 RX ADMIN — IBUPROFEN 600 MG: 600 TABLET, FILM COATED ORAL at 21:48

## 2025-03-09 RX ADMIN — TRAZODONE HYDROCHLORIDE 50 MG: 50 TABLET ORAL at 21:48

## 2025-03-09 RX ADMIN — ARIPIPRAZOLE 10 MG: 5 TABLET ORAL at 13:41

## 2025-03-09 RX ADMIN — HYDROXYZINE HYDROCHLORIDE 50 MG: 25 TABLET, FILM COATED ORAL at 21:49

## 2025-03-09 RX ADMIN — VENLAFAXINE HYDROCHLORIDE 150 MG: 100 TABLET ORAL at 16:28

## 2025-03-09 ASSESSMENT — PAIN SCALES - GENERAL
PAINLEVEL_OUTOF10: 0
PAINLEVEL_OUTOF10: 6
PAINLEVEL_OUTOF10: 6

## 2025-03-09 ASSESSMENT — PAIN DESCRIPTION - LOCATION: LOCATION: HEAD

## 2025-03-09 ASSESSMENT — PAIN SCALES - WONG BAKER
WONGBAKER_NUMERICALRESPONSE: HURTS EVEN MORE
WONGBAKER_NUMERICALRESPONSE: NO HURT

## 2025-03-09 ASSESSMENT — PAIN DESCRIPTION - DESCRIPTORS: DESCRIPTORS: ACHING

## 2025-03-09 ASSESSMENT — PAIN DESCRIPTION - ORIENTATION: ORIENTATION: OTHER (COMMENT)

## 2025-03-09 NOTE — BH NOTE
[facility]  Admission Note       Admission Type: Voluntary    Reason for Admission: Severe Major Depressive Disorder.  Elvia is a 64 years old White( Non-) female, admitted into the unit voluntarily @ 19:21 via [facility], ED with a primary diagnosis of Severe Major Depressive Disorder. Pt arrived at the ED on the 3/7 with the chief complaint of extreme depression, and SI with no plan. Pt reports not feeling right, feeling out of control of her life as she is unable to manage her finances, keep her place clean and have no motivation to shower. Pt reports feeling hopeless, and worthless, her 9 years old dog passed away last Friday, pt is single and lives alone. Pt has difficulty articulating suicidal ideations, however was able to say that she would use a gun and shoot self in the left neck, but does not have acces to gun  Allergic to Sulfa antibiotics.  UDS: Neg,  BAL<10   On admission into U, Pt is A&O x 4, sad, calm, and cooperative. Pt endorses depression of 8/10, anxiety of 7/10 and headache of 7/10. Pt denies active SI, denies HI/AH/VH. Patient scored '' Moderate risk '' for suicide on C-SSRS screening in the unit and @ the ED. Provider Kimberly Ramirez was notified of CSSR screening results and patient presentation. Standard U orders obtained, constant observation and suicide precaution flowsheets were discontinued. (no order in place) Q 15 minute checks continued for safety. Dual skin assessment was performed by writer and RN, Batool Arvizu. Skin intact.    PATIENT STRENGTHS:  ADL independent.  Able to make needs known.   Participated in plan of care.    Patient  Limitations:  Depression.        Addictive Behavior: None       Medical Problems:   Past Medical History:   Diagnosis Date    Arthritis     OSTEO    Nausea & vomiting     YULIANA (obstructive sleep apnea)     on CPAP for last 3 y ears    Psychiatric disorder     DEPRESSION    Sleep apnea     uses c pap     Status EXAM:  Mental Status

## 2025-03-09 NOTE — PROGRESS NOTES
The beginning of Daylight Saving Time occurred at 0200 hrs. Documentation of patient care and medications administered is done with respect to the time change.

## 2025-03-09 NOTE — PLAN OF CARE
Problem: Discharge Planning  Goal: Discharge to home or other facility with appropriate resources  3/9/2025 1106 by Sherman Fajardo RN  Outcome: Not Progressing  3/8/2025 2007 by Peggy Reza RN  Outcome: Not Progressing     Problem: Pain  Goal: Verbalizes/displays adequate comfort level or baseline comfort level  3/8/2025 2007 by Peggy Reza RN  Outcome: Not Progressing     Problem: Depression  Goal: Will be euthymic at discharge  Description: INTERVENTIONS:  1. Administer medication as ordered  2. Provide emotional support via 1:1 interaction with staff  3. Encourage involvement in milieu/groups/activities  4. Monitor for social isolation  3/8/2025 2007 by Peggy Reza RN  Outcome: Not Progressing     Problem: Anxiety  Goal: Will report anxiety at manageable levels  Description: INTERVENTIONS:  1. Administer medication as ordered  2. Teach and rehearse alternative coping skills  3. Provide emotional support with 1:1 interaction with staff  3/9/2025 1106 by Sherman Fajardo, RN  Outcome: Not Progressing  3/8/2025 2007 by Peggy Reza RN  Outcome: Not Progressing

## 2025-03-09 NOTE — H&P
History and Physical    Date of Service:  3/9/2025  Primary Care Provider: Dima Hammonds MD  Source of information: patient, nurse, chart review    Chief Complaint: No chief complaint on file.      History of Presenting Illness:   64-year-old female past with a history of osteoarthritis, YULIANA, depression presents to the ED due to depression.  Patient reports having suicidal thoughts without a plan.  At the ED patient was hemodynamically stable.  Significant labs include albumin 3.2, , ALT 72, , WBC 11.7.  UA was negative for infection but did show trace ketones and some proteins.    Currently, patient denies vision or hearing changes, fever, chills, weakness, chest pain, dyspnea, cough, abd pain, N,V,D,C, blood in stool, melena, blood in urine, LE edema, numbness or tingling in extremities.       REVIEW OF SYSTEMS:  Per HPI     Past Medical History:   Diagnosis Date    Arthritis     OSTEO    Nausea & vomiting     YULIANA (obstructive sleep apnea)     on CPAP for last 3 y ears    Psychiatric disorder     DEPRESSION    Sleep apnea     uses c pap      Past Surgical History:   Procedure Laterality Date    GI      COLONOSCOPY MULTIPLE WITH REMOVAL OF BENIGN POLYPS    GYN      LT OOPHORECTOMY    ORTHOPEDIC SURGERY      LLT MENISCUS CONDROPLASTY    ORTHOPEDIC SURGERY      LT ROTATOR CUFF 2 TIMES RT ROTATOR 1 TIME    ORTHOPEDIC SURGERY      BILATERAL KNEE REPLACEMENTS    OVARY REMOVAL      Left ovary removed.    [place] UNLISTED PROCEDURE ABDOMEN PERITONEUM & OMENTUM  2018    ABDOMINAL - DUODENAL SWITCH, WITH CHOLECYSTECTOMY     SHOULDER ARTHROPLASTY Left     \"reverse procedure\"     Prior to Admission medications    Medication Sig Start Date End Date Taking? Authorizing Provider   ARIPiprazole (ABILIFY) 5 MG tablet Take 1 tablet by mouth daily 2/4/25 3/6/25  Julieta Bermeo MD   calcium elemental (OSCAL) 500 MG TABS tablet Take 1 tablet by mouth 2 times daily  Patient not taking: Reported on 3/9/2025  other diagnostic data    Abnormal Labs Reviewed - No data to display    [unfilled]    IMAGING:   No orders to display        ECG/ECHO:  [unfilled]       Notes reviewed from all clinical/nonclinical/nursing services involved in patient's clinical care. Care coordination discussions were held with appropriate clinical/nonclinical/ nursing providers based on care coordination needs.     Assessment and Plan :   Given the patient's current clinical presentation, there is a high level of concern for decompensation if discharged from the emergency department. Complex decision making was performed, which includes reviewing the patient's available past medical records, laboratory results, and imaging studies.    Active Problems:    * No active hospital problems. *  Resolved Problems:    * No resolved hospital problems. *      Tension headache - NSAIDS try first, if fail start Fioricet   Dehydration  -based on labs, encourage patient to increase PO intake    Chronic hx   YULIANA - has cpap machine on the floor to use  Vit D Def - restart cholecalciferol   History of bariatric surgery - small frequent meals     Depression  -per psych        DIET: ADULT DIET; Regular   ISOLATION PRECAUTIONS: No active isolations  CODE STATUS: [unfilled]   DVT PROPHYLAXIS: lovenox px  FUNCTIONAL STATUS PRIOR TO HOSPITALIZATION: independent  Ambulatory status/function: independent  ANTICIPATED DISCHARGE: per [facility]  ANTICIPATED DISPOSITION: per [facility]  EMERGENCY CONTACT/SURROGATE DECISION MAKER:     Anupam Natarajan (Other)  [phone]        CRITICAL CARE WAS PERFORMED FOR THIS ENCOUNTER: none      Signed By: Dayton Salas MD     March 9, 2025         Please note that this dictation may have been completed with Dragon, the computer voice recognition software.  Quite often unanticipated grammatical, syntax, homophones, and other interpretive errors are inadvertently transcribed by the computer software.  Please disregard these errors.  Please excuse

## 2025-03-09 NOTE — PLAN OF CARE
Problem: Discharge Planning  Goal: Discharge to home or other facility with appropriate resources  Outcome: Not Progressing     Problem: Pain  Goal: Verbalizes/displays adequate comfort level or baseline comfort level  Outcome: Not Progressing     Problem: Depression  Goal: Will be euthymic at discharge  Description: INTERVENTIONS:  1. Administer medication as ordered  2. Provide emotional support via 1:1 interaction with staff  3. Encourage involvement in milieu/groups/activities  4. Monitor for social isolation  Outcome: Not Progressing     Problem: Anxiety  Goal: Will report anxiety at manageable levels  Description: INTERVENTIONS:  1. Administer medication as ordered  2. Teach and rehearse alternative coping skills  3. Provide emotional support with 1:1 interaction with staff  Outcome: Not Progressing

## 2025-03-09 NOTE — PROGRESS NOTES
Pt tolerated snacks offered, received PRN Tylenol for headache, and Maalox for heartburns with good effect. Appears to have slept for 6.15 hours with her CPAP, LOS maintained while in place.

## 2025-03-09 NOTE — BH NOTE
Met patient in her room, asleep with her CPAP on place, patient allowed tp rest assessment was done at a later time. 1000: On assessment patient appeared calm, nil distress noted. Patient was cooperative with assessment. Patient alert and oriented x 4. Patient presents with a flat affect and depressed mood. Patient denies SI, HI, and AVH but endorses anxiety which she rated 7/10 and depression she rated 8/10. Vital signs done and charted. Patient is compliant with scheduled medications.  Complaint of itching to feet and appears anxious so Hydroxyzine 50 mg PO given at 1030. Q15 minute safety checks maintained. CSSRs assessment rated no risk.  Seen by medical doctor.

## 2025-03-09 NOTE — H&P
Initial psychiatric evaluation:    Chief complaint:\" For depression \"      HISTORY OF PRESENTING COMPLAINT:  Elvia Natarajan is a 64 y.o. White (non-) female with history of major depressive disorder and generalized anxiety disorder.  Admitted for worsening depression, anxiety and suicidal ideation.  Reports that she has been dealing with depression for many years but lately over the past few months she has been struggling a lot with depression and feeling her depression is getting out of control.  She was working as a  but has not been able to work since December 2024 due to her depression.  Has been feeling increasingly depressed specially over the past 3 to 4 weeks.  Has been feeling hopeless, helpless, was having suicidal ideation with no plan or intent.  Lacking motivation and energy to do things, losing interest in things she used to enjoy, has been withdrawn and isolative to herself, feeling fatigue and tired, difficulty with focus and concentration.  Mentioned having financial stress contributing to her depression, most recently she lost her dog suddenly about a week ago and denies any big trigger for her.  Also reported feeling anxious, worrying constantly, difficulty controlling her worry, ruminating a lot.  Denies panic attacks.  Sleeping relatively good.  Reported she did neuropsychological testing and was diagnosed with mild cognitive impairment recently.  Denies hallucinations or paranoia.  Denies HI.  Denies history of bipolar disorder, manic hypomanic episode.  Has been compliant with her medications at home.  Denies drug or alcohol use.    PAST PSYCHIATRIC HISTORY and SUBSTANCE ABUSE HISTORY:  History of major depressive disorder.  Generalized anxiety disorder.  History of inpatient psychiatric admission.  Denies history of suicide attempt.  Has an outpatient psychiatrist or therapist.  Home medication: She has been on Effexor 150 mg twice daily.  Abilify 5 mg daily.      CREATININE 0.59 03/07/2025    GLUCOSE 140 (H) 03/07/2025    CALCIUM 8.2 (L) 03/07/2025    BILITOT 0.9 03/07/2025    ALKPHOS 131 (H) 03/07/2025     (H) 03/07/2025    ALT 72 03/07/2025    LABGLOM >90 03/07/2025    GLOB 2.9 03/07/2025         No results found for: \"VALAC\", \"VALP\", \"CARB2\"  No results found for: \"LITHM\"  RADIOLOGY REPORTS:(reviewed/updated 3/9/2025)  [unfilled]  @[facility]@    PSYCHOSOCIAL HISTORY:  She is single.  She lives alone.  Has no children.  Has limited family support.  Was working as a .      MENTAL STATUS EXAM:    General appearance:    groomed, psychomotor activity is slowed  Eye contact: Avoids eye contact  Speech: Spontaneous, soft, decreased output.   Affect : Depressed, decreased range  Mood: \"Depressed\"  Thought Process: Logical, goal directed  Perception: Denies AH or VH.   Thought Content: Passive SI with no plan or intent.  Denies HI.  Insight: Partial  Judgement: Fair  Cognition: Intact grossly.     ASSESSMENT AND PLAN:   Major depressive disorder, recurrent, severe without psychotic features  Generalized anxiety disorder  Patient admitted to the unit.   Initial/milieu/group therapy will be provided.  Patient be seen by hospitalist for H and P and labs drawn and reviewed.  Patient will be started on back on her home medication venlafaxine 150 mg twice daily.  Abilify 5 mg daily.  Will increase Abilify to 10 mg daily.  Mirtazapine 45 mg nightly as needed for sleep.  Side effects/risks/benefits of the medication discussed with patient.  Patient verbalized understanding.   Will expand collateral information and case management will be done.  Patient will be referred to outpatient services after stabilization.

## 2025-03-09 NOTE — PROGRESS NOTES
[facility] Pharmacy Admission Medication History    Information obtained from:[facility] AVS Discharge Med List for encounter 1/17/25-2/03/25  RxQuery data available1:Yes    Comments/recommendations:  Patient transferred to [facility] BHU from [facility] ED evening of  3/08/25.  At [facility] ED patient reported she has been taking her medications.  Unable to conduct admission med rec interview at [facility] due to patient sleeping - will attempt again.  Med list compiled from Discharge Med list on AVS from recent inpatient BHU encounter at [facility], 1/17/25- 2/03/25.    The [place] Prescription Monitoring Program () was accessed to determine fill history of any controlled medications.      Medication changes (since last review):  Added  Methylphenidate 5 mg   Removed  None  Adjusted  None       1RxQuery pharmacy benefit data reflects medications filled and processed through the patient's insurance, however                this data does NOT capture whether the medication was picked up or is currently being taken by the patient.         Patient allergies:   Allergies as of 03/08/2025 - Reviewed 03/08/2025   Allergen Reaction Noted    Sulfa antibiotics  12/27/2010         Prior to Admission Medications   Prescriptions Last Dose Informant   ARIPiprazole (ABILIFY) 5 MG tablet     Sig: Take 1 tablet by mouth daily   Multiple Vitamin (MULTIVITAMIN) TABS tablet     Sig: Take 1 tablet by mouth in the morning and at bedtime   Pancrelipase, Lip-Prot-Amyl, (ZENPEP) 44711-693947 units CPEP     Sig: Take 2 capsules 3 times daily with meals and 1 capsule with snacks as needed.   Vitamin D (CHOLECALCIFEROL) 25 MCG (1000 UT) TABS tablet     Sig: Take 3 tablets by mouth daily   calcium elemental (OSCAL) 500 MG TABS tablet     Sig: Take 1 tablet by mouth 2 times daily   methylphenidate (RITALIN) 5 MG tablet     Sig: Take 1 tablet by mouth 2 times daily. with meals. Max Daily Amount: 10 mg   mirtazapine (REMERON) 45 MG tablet     Sig: Take 1 tablet by mouth nightly   thiamine

## 2025-03-09 NOTE — PROGRESS NOTES
[facility] Pharmacy Admission Medication History    Information obtained from:[facility] AVS Discharge Med List for encounter 1/17/25-2/03/25, patient interview  RxQuery data available1:Yes    Comments/recommendations:  Med list compiled from Discharge Med list on AVS from recent inpatient BHU encounter at [facility], 1/17/25- 2/03/25.  Patient reported that she is NOT TAKING pancrelipase, thiamine, or methylphenidate as those medications were not with the meds filled for her from discharge (possible they were not covered by insurance).  She reported she stopped taking the calcium tab but cannot remember why she stopped it.    Although not currently taking the methylphenidate, she stated that she did think it helped her to be more alert and focused when she was taking it.  The [place] Prescription Monitoring Program () was accessed to determine fill history of any controlled medications.      Medication changes (since last review):  Added   None  Removed  Methylphenidate 5 mg twice daily with meals  Calcium elemental 500 mg twice daily  Zenpep 60,000-189,600 units, 2 capsules 3 times daily with meals and 1 capsule with snacks as needed  Thiamine 100 mg daily  Adjusted  None       1RxQuery pharmacy benefit data reflects medications filled and processed through the patient's insurance, however                this data does NOT capture whether the medication was picked up or is currently being taken by the patient.         Patient allergies:   Allergies as of 03/08/2025 - Reviewed 03/08/2025   Allergen Reaction Noted    Sulfa antibiotics  12/27/2010         Prior to Admission Medications   Prescriptions Last Dose Informant   ARIPiprazole (ABILIFY) 5 MG tablet     Sig: Take 1 tablet by mouth daily   Multiple Vitamin (MULTIVITAMIN) TABS tablet     Sig: Take 1 tablet by mouth in the morning and at bedtime       Vitamin D (CHOLECALCIFEROL) 25 MCG (1000 UT) TABS tablet     Sig: Take 3 tablets by mouth daily   mirtazapine (REMERON) 45 MG  tablet 3/06/2025    Sig: Take 1 tablet by mouth nightly       venlafaxine (EFFEXOR) 75 MG tablet     Sig: Take 2 tablets by mouth 2 times daily   vitamin B-12 500 MCG tablet 3/06/2025    Sig: Take 1 tablet by mouth daily      Facility-Administered Medications: None          Thank you,  Lonnie Kruger, [facility]

## 2025-03-10 PROBLEM — F32.2 MAJOR DEPRESSIVE DISORDER, SEVERE (HCC): Status: ACTIVE | Noted: 2025-03-10

## 2025-03-10 PROCEDURE — 1240000000 HC EMOTIONAL WELLNESS R&B

## 2025-03-10 PROCEDURE — 6370000000 HC RX 637 (ALT 250 FOR IP): Performed by: INTERNAL MEDICINE

## 2025-03-10 PROCEDURE — 99232 SBSQ HOSP IP/OBS MODERATE 35: CPT | Performed by: PSYCHIATRY & NEUROLOGY

## 2025-03-10 PROCEDURE — 6370000000 HC RX 637 (ALT 250 FOR IP): Performed by: PSYCHIATRY & NEUROLOGY

## 2025-03-10 PROCEDURE — 6370000000 HC RX 637 (ALT 250 FOR IP)

## 2025-03-10 PROCEDURE — 6370000000 HC RX 637 (ALT 250 FOR IP): Performed by: NURSE PRACTITIONER

## 2025-03-10 RX ORDER — IBUPROFEN 600 MG/1
600 TABLET, FILM COATED ORAL EVERY 6 HOURS PRN
Status: DISCONTINUED | OUTPATIENT
Start: 2025-03-10 | End: 2025-03-14 | Stop reason: HOSPADM

## 2025-03-10 RX ORDER — TRAZODONE HYDROCHLORIDE 50 MG/1
50 TABLET ORAL NIGHTLY PRN
Status: DISCONTINUED | OUTPATIENT
Start: 2025-03-10 | End: 2025-03-14 | Stop reason: HOSPADM

## 2025-03-10 RX ORDER — MIRTAZAPINE 15 MG/1
15 TABLET, FILM COATED ORAL NIGHTLY
Status: DISCONTINUED | OUTPATIENT
Start: 2025-03-10 | End: 2025-03-14 | Stop reason: HOSPADM

## 2025-03-10 RX ORDER — FOLIC ACID 1 MG/1
1 TABLET ORAL DAILY
Status: DISCONTINUED | OUTPATIENT
Start: 2025-03-11 | End: 2025-03-14 | Stop reason: HOSPADM

## 2025-03-10 RX ORDER — MULTIVITAMIN WITH IRON
1000 TABLET ORAL DAILY
Status: DISCONTINUED | OUTPATIENT
Start: 2025-03-11 | End: 2025-03-14 | Stop reason: HOSPADM

## 2025-03-10 RX ADMIN — Medication 1000 UNITS: at 08:44

## 2025-03-10 RX ADMIN — IBUPROFEN 600 MG: 600 TABLET, FILM COATED ORAL at 16:31

## 2025-03-10 RX ADMIN — TRAZODONE HYDROCHLORIDE 50 MG: 50 TABLET ORAL at 20:43

## 2025-03-10 RX ADMIN — ALUMINUM HYDROXIDE, MAGNESIUM HYDROXIDE, AND SIMETHICONE 30 ML: 200; 200; 20 SUSPENSION ORAL at 20:10

## 2025-03-10 RX ADMIN — HYDROXYZINE HYDROCHLORIDE 50 MG: 25 TABLET, FILM COATED ORAL at 20:10

## 2025-03-10 RX ADMIN — ACETAMINOPHEN 650 MG: 325 TABLET ORAL at 20:10

## 2025-03-10 RX ADMIN — VENLAFAXINE HYDROCHLORIDE 150 MG: 100 TABLET ORAL at 16:31

## 2025-03-10 RX ADMIN — ARIPIPRAZOLE 10 MG: 5 TABLET ORAL at 08:44

## 2025-03-10 RX ADMIN — MIRTAZAPINE 15 MG: 15 TABLET, FILM COATED ORAL at 20:10

## 2025-03-10 RX ADMIN — VENLAFAXINE HYDROCHLORIDE 150 MG: 100 TABLET ORAL at 08:44

## 2025-03-10 RX ADMIN — THERA TABS 1 TABLET: TAB at 08:44

## 2025-03-10 RX ADMIN — ALUMINUM HYDROXIDE, MAGNESIUM HYDROXIDE, AND SIMETHICONE 30 ML: 200; 200; 20 SUSPENSION ORAL at 09:13

## 2025-03-10 ASSESSMENT — PAIN SCALES - GENERAL
PAINLEVEL_OUTOF10: 0
PAINLEVEL_OUTOF10: 6

## 2025-03-10 ASSESSMENT — PAIN DESCRIPTION - LOCATION: LOCATION: HEAD

## 2025-03-10 ASSESSMENT — PAIN DESCRIPTION - DESCRIPTORS: DESCRIPTORS: ACHING

## 2025-03-10 NOTE — PLAN OF CARE
Problem: Discharge Planning  Goal: Discharge to home or other facility with appropriate resources  3/9/2025 2345 by Peggy Reza RN  Outcome: Not Progressing  3/9/2025 1106 by Sherman Fajardo RN  Outcome: Not Progressing     Problem: Depression  Goal: Will be euthymic at discharge  Description: INTERVENTIONS:  1. Administer medication as ordered  2. Provide emotional support via 1:1 interaction with staff  3. Encourage involvement in milieu/groups/activities  4. Monitor for social isolation  Outcome: Not Progressing     Problem: Anxiety  Goal: Will report anxiety at manageable levels  Description: INTERVENTIONS:  1. Administer medication as ordered  2. Teach and rehearse alternative coping skills  3. Provide emotional support with 1:1 interaction with staff  3/9/2025 2345 by Peggy Reza RN  Outcome: Not Progressing  3/9/2025 1106 by Sherman Fajardo, RN  Outcome: Not Progressing

## 2025-03-10 NOTE — CARE COORDINATION
03/10/25 1621   [facility]   Date of Plan 03/10/25   Primary Diagnosis Code MDD   Plan of Care   Long Term Goal (LTG) Stated in patient/guardian terms remain safe and stable in the community   Short Term Goal 1   Short Term Goal 1 Client will maintain compliance with medication regime   Baseline Functioning client has not been taking medication as prescribed and is experiencing symptoms of depression/passive SI   Target client will take medication as prescribed and report a reduction in symptoms of depression/passive SI   Objectives Client will participate in group therapy;Other (comment)   Intervention  Monitor medications   Frequency daily   Measured by Self report;Staff observation   Staff Responsible Clinical staff;[facility] staff   Intervention 2 Referral to community services   Frequency prior to discharge   Measured by Self report;Staff observation   Staff Responsible Clinical staff;[facility] staff   STG Goal 1 Status: Patient Appears to be  Progressing toward treatment plan goal  (acknowledging need for assistance)   Short Term Goal 2   Short Term Goal 2 Client will learn and demonstrate effective coping skills   Baseline Functioning client is not able to use effective coping skills and requires hospitalization to keep themselves safe   Target client will learn and use effective coping skills to remain safe in the community   Objectives Client will participate in group therapy;Other (comment)   Intervention  Group therapy;Milieu therapy and support   Frequency on going   Measured by Self report;Staff observation   Staff Responsible Clinical staff;[facility] staff   Intervention 2 Referral to community services   Frequency prior to discharge   Measured by Staff observation;Self report   Staff Responsible Clinical staff;[facility] staff   STG Goal 2 Status: Patient Appears to be  Progressing toward treatment plan goal  (acknowledging need for assistance)   Crisis/Safety/Discharge Plan   Crisis/Safety Plan Standard program interventions and  protocol   Comprehensive Assessment Completion Date 03/10/25   Discharge Plan Home

## 2025-03-10 NOTE — GROUP NOTE
Group Therapy Note    Date: 3/10/2025    Group Start Time: 1000  Group End Time: 1045  Group Topic: Relaxation    [facility] ACUTE BEH[facility]    Harmony Maciel        Group Therapy Note    Attendees: 6       Patient's Goal:  To participate in relaxation activity    Notes:  Pt did not attend session    Discipline Responsible: Recreational Therapist      Signature:  HARMONY MACIEL

## 2025-03-10 NOTE — PLAN OF CARE
Problem: Discharge Planning  Goal: Discharge to home or other facility with appropriate resources  3/10/2025 1236 by Carmelina Hurt RN  Outcome: Not Progressing  3/9/2025 2345 by Peggy Reza RN  Problem: Pain  Goal: Verbalizes/displays adequate comfort level or baseline comfort level  3/10/2025 1236 by Carmelina Hurt RN  Outcome: Progressing  3/9/2025 2345 by Peggy Reza RN  Outcome: Not Progressing     Problem: Self Harm/Suicidality  Goal: Will have no self-injury during hospital stay  Description: INTERVENTIONS:  1.  Ensure constant observer at bedside with Q15M safety checks  2.  Maintain a safe environment  3.  Secure patient belongings  4.  Ensure family/visitors adhere to safety recommendations  5.  Ensure safety tray has been added to patient's diet order  6.  Every shift and PRN: Re-assess suicidal risk via Frequent Screener    3/10/2025 1236 by Carmelina Hurt RN  Outcome: Progressing  3/9/2025 2345 by Peggy Reza RN  Outcome: Progressing     Problem: Anxiety  Goal: Will report anxiety at manageable levels  Description: INTERVENTIONS:  1. Administer medication as ordered  2. Teach and rehearse alternative coping skills  3. Provide emotional support with 1:1 interaction with staff  3/10/2025 1236 by Carmelina Hurt RN  Outcome: Progressing  3/9/2025 2345 by Peggy Reza RN  Outcome: Not Progressing     Problem: Spiritual Care  Goal: Pt/Family able to move forward in process of forgiving self, others, and/or higher power  Description: INTERVENTIONS:  1. Assist patient/family to overcome blocks to healing by use of spiritual practices (prayer, meditation, guided imagery, reiki, breath work, etc).  2. De-myth guilt and help patient/family identify possible irrational spiritual/cultural beliefs and values.  3. Explore possibilities of making amends & reconciliation with self, others, and/or a greater power.  4. Guide patient/family in identifying painful feelings of  guilt.  5. Help patient/famiy explore and identify spiritual beliefs, cultural understandings or values that may help or hinder letting go of issue.  6. Help patient/family explore feelings of anger, bitterness, resentment.  7. Help patient/family identify and examine the situation in which these feelings are experienced.  8. Help patient/family identify destructive displacement of feelings onto other individuals.  9. Invite use of sacraments/rituals/ceremonies as appropriate (e.g. - confession, anointing, smudging).  10. Refer patient/family to formal counseling and/or to [facility] for further support work.  Outcome: Progressing   3. Encourage involvement in milieu/groups/activities  4. Monitor for social isolation  3/10/2025 1236 by Carmelina Hurt RN  Outcome: Not Progressing  3/9/2025 2345 by Peggy Reza, RN  Outcome: Not Progressing     Problem: Anxiety  Goal: Will report anxiety at manageable levels  Description: INTERVENTIONS:  1. Administer medication as ordered  2. Teach and rehearse alternative coping skills  3. Provide emotional support with 1:1 interaction with staff  3/10/2025 1236 by Carmelina Hurt RN  Outcome: Progressing  3/9/2025 2345 by Peggy Reza RN  Outcome: Not Progressing

## 2025-03-10 NOTE — PROGRESS NOTES
Night Shift assessment completed.   Alive is visible in the milieu, alert, calm, cooperative and free from distress. Appropriate affect. Anxious/depressed mood. Endorses anxiety of 7/10, depression of 8/10 and headache of 7/10. Denies SI/HI/AH/VH. C-SSRS, No risk. Interacts appropriately and able to make needs known. No behavioral issues observed.     Nursing Intervention: VS T 98 °F (36.7 °C),HR 68 R 18, B/P 121/78, O2 Sat 100 %. Med and evening snack compliant. Had scheduled Motrin for pain. Requested and received PRN Atarax and Trazodone for anxiety and sleep with good effect. Emotional support and encouragement provided. No side effects from medications observed.     Plan: Monitoring for safety and behavior continues q 15 min's and LOS with CPAP. Pt appears to have slept for 7.45 hours.

## 2025-03-10 NOTE — BH NOTE
PSYCHOSOCIAL ASSESSMENT  :Patient identifying info:   Elvia Natarajan is a 64 y.o. female admitted 3/8/2025  7:04 PM    Presenting problem and precipitating factors:   Pt presented to ED voluntarily for suicidal ideation. Pt endorses depression worse. Pt condition was precipitated by psychosocial stressors: problems related to the social environment and other psychological and environmental problems    Per chart review:     On unit: Pt reports she presented to ED for SI. Pt reports she has been taking her medication though has not been compliant due to forgetfulness at points. Pt reports the biggest stressor she has experienced recently is her dog dying, pt reports she lives alone. Pt reports she has a brother and two close friends who have been helpful recently. Pt reports she has recently been stressed due to finances. 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 Pt reports she has been seeing a therapist for some time, though due to cognitive impairments, she reports therapist thinks she needs a higher level of care. Pt reports she does follow up with someone for medication management but she is unsure how long she will continue to follow up with her, due to memory issues pt is unable to recall why. Pt reports she receieve nueropsych testing at [facility] and reports some mild cognitive decline was noted, though she is unsure what came of it. Pt endorses passive SI, reports hx of chronic passive SI though reports she would not act on this and has not attempted in the past. Pt endorses feelings of hopelessness, helplessness and worthlessness. Pt reports she has been struggling to eat, reports she had been diagnosed with malnutrition in the past. Pt does report having bariatric surgery in the past, about 7 years ago and reports she has not been as compliant with the vitamins and supplements due to depression. Pt reports she was hospitalized at [facility] in January, helpful to be in a safe place,  Unknown    Smokeless tobacco: Not on file   Substance Use Topics    Alcohol use: Not Currently         History of biomedical complications associated with substance abuse: N/A.    Patient's current acceptance of treatment or motivation for change: fair    Family constellation: The patient is Single. The patient has 0 children.    Is significant other involved? No    Describe support system: gets along well with co-workers and alone & isolated    Describe living arrangements and home environment: The patient lives alone.  Address on file:  [address] [place] [phone]    GUARDIAN/POA: No    Guardian Name: n/a    Guardian Contact: n/a    Health issues:   Past Medical History:   Diagnosis Date    Arthritis     OSTEO    Nausea & vomiting     YULIANA (obstructive sleep apnea)     on CPAP for last 3 y ears    Psychiatric disorder     DEPRESSION    Sleep apnea     uses c pap       Trauma history: not reported    Legal issues: not reported     History of  service: No    Financial status: social security and family    Confucianism/cultural factors: not applicable     Education/work history: College, Retired    Have you been licensed as a health care professional (current or ): no    Describe coping skills: limited and ineffective    Patsy Garcia MSW  03/10/25

## 2025-03-10 NOTE — BH NOTE
Shift Assessment (1566-0421):     Writer met patient in their room. Patient resting quietly in bed. Patient appears free of acute distress. Patient is calm and cooperative during VS and assessment. VS are stable. Patient is alert and oriented x4. Patient walks with a steady gait, has clear speech, and makes good eye contact. Patient is appropriately dressed and well groomed. Patient presents flat with an appropriate affect. Patient endorses depression as 5/10. Patient denies anxiety, SI, HI, and AVH. Patient scores no risk on CSSRS screening. Patient is isolative to her room and self. Patient is medication and meal compliant. Q15 min checks in place for safety.     0913: PRN Maalox given to patient for heartburn. Patient tolerated medication well as no adverse effects reported or observed. Medication effective per patient as she is able to sleep.

## 2025-03-10 NOTE — CARE COORDINATION
03/10/25 1623   Suicidal Ideation   Wish to be Dead Lifetime - Yes   Non-Specific Active Suicidal Thoughts Lifetime - Yes;Past 1 month - Yes   Suicidal Behavior Trigger No Selected   Active Suicidal Ideation with Any Methods (Not Plan) without Intent to Act Lifetime - No   Active Suicidal Ideation with Some Intent to Act, without Specific Plan Lifetime - No   Active Suicidal Ideation with Specific Plan and Intent Lifetime- No   Intensity of Ideation   Frequency Daily or almost daily   Duration Less than 1 hour/some of the time   Controllability Can control thoughts with a lot of difficulty   Deterrents Deterrents definitely did not stop you   Reasons for Ideation Mostly to end or stop the pain   Suicidal Behavior   Actual Attempt Lifetime - No   Has subject engaged in Non-Suicidal Self-Injurious Behavior? Lifetime - No   Interrupted Attempt Lifetime - No   Aborted or Self-Interrupted Attempt Lifetime - No   Preparatory Acts or Behavior Lifetime - No

## 2025-03-10 NOTE — GROUP NOTE
Group Therapy Note    Date: 3/10/2025    Group Start Time: 1400  Group End Time: 1500  Group Topic: Recreational    [facility]    Harmony Maciel        Group Therapy Note    Attendees: 7       Patient's Goal:  To concentrate on selected task    Notes:  Pt did not attend session      Discipline Responsible: Recreational Therapist      Signature:  HARMONY MACIEL

## 2025-03-10 NOTE — PROGRESS NOTES
Comprehensive Nutrition Assessment    Type and Reason for Visit: Initial    Nutrition Recommendations/Plan:   Diet as tolerated.  Enc po and fluid intake  Cont supplements as ordered       Malnutrition Assessment:  Malnutrition Status:  Moderate malnutrition (03/10/25 0636)    Context:  Social/Environmental Circumstances     Findings of the 6 clinical characteristics of malnutrition:  Energy Intake:  50% or less estimated energy requirements for 1 month or longer  Weight Loss:  Mild weight loss     Body Fat Loss:  Severe body fat loss Orbital, Buccal region   Muscle Mass Loss:  Mild muscle mass loss Temples (temporalis), Clavicles (pectoralis & deltoids)  Fluid Accumulation:  No fluid accumulation     Strength:  Not Performed       Nutrition Assessment:    Pt admitted to [facility].  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  PTA  pt has been on numerous supplements until the end of 2024, and Zenpep.  Pt reports limited follow up routine following bariatric surgery.  She has a recent hx of orange, morales-like stools, focuses on eating protein and vegetables. Notes wt has been stable (120-130) recently.   Based on chart review, Pt was seen by DIMA Ortiz on 10/2/23. At that time pt was tolerating around 60 oz fluids and 120 - 150 g protein daily. Pt states that at the time of her procedure, she was told that 120-150g protein should be her daily goal. She was also taking vitamins daily; Zhao brand MVI, 2,000mg calcium citrate, 1,000mg magnesium glycinate, 600mcg selenium, 100mg zinc, 50,000IUs vitamin A, 100mg vitamin B1, 1,500mg vitamin C, 5,000IUs vitamin D3, 1,000IU vitamin E, 1,000 mcg vitamin K1, vitamin K2-MK4, and vitamin K2-MK7 300mcg. Pt reported her pre-surgery weight was ~310#.    Nutrition Related Findings:    Pt tolerating diet, meds: MV, Vit D Wound Type: None       Lab Results   Component Value Date/Time     03/07/2025 03:02 PM    K 3.6 03/07/2025 03:02 PM      constipation, patient reported barriers    Nutrition Interventions:   Food and/or Nutrient Delivery: Continue Current Diet, Continue Oral Nutrition Supplement  Nutrition Education/Counseling: No recommendation at this time  Coordination of Nutrition Care: Continue to monitor while inpatient       Goals:  Previous Goal Met: Progressing toward Goal(s)  Goals: PO intake 75% or greater, by next RD assessment       Nutrition Monitoring and Evaluation:   Behavioral-Environmental Outcomes: Readiness for Change  Food/Nutrient Intake Outcomes: Food and Nutrient Intake, Supplement Intake  Physical Signs/Symptoms Outcomes: Biochemical Data, Constipation, GI Status, Meal Time Behavior, Nutrition Focused Physical Findings    Discharge Planning:    Too soon to determine     Sammy Madden, RD  [phone]

## 2025-03-10 NOTE — BH NOTE
E/M Psychiatric Progress Note    Patient: Elvia Natarajan MRN: [id number]  SSN: [id number]    YOB: 1960  Age: 64 y.o.  Sex: female      Admit Date: 3/8/2025    LOS: 2 days     Chief Complaint: suicidal ideation    Subjective:     HPI / INTERVAL HISTORY:    Elvia Natarajan is a 64 y.o. White (non-) female with history of major depressive disorder and generalized anxiety disorder.  Admitted for worsening depression, anxiety and suicidal ideation.  Reports that she has been dealing with depression for many years but lately over the past few months she has been struggling a lot with depression and feeling her depression is getting out of control.  She was working as a  but has not been able to work since December 2024 due to her depression.  Has been feeling increasingly depressed specially over the past 3 to 4 weeks.  Has been feeling hopeless, helpless, was having suicidal ideation with no plan or intent.  Lacking motivation and energy to do things, losing interest in things she used to enjoy, has been withdrawn and isolative to herself, feeling fatigue and tired, difficulty with focus and concentration.  Mentioned having financial stress contributing to her depression, most recently she lost her dog suddenly about a week ago and denies any big trigger for her.    Also reported feeling anxious, worrying constantly, difficulty controlling her worry, ruminating a lot.  Denies panic attacks.  Sleeping relatively good.  Reported she did neuropsychological testing and was diagnosed with mild cognitive impairment recently.  Denies hallucinations or paranoia.  Denies HI.  Denies history of bipolar disorder, manic hypomanic episode.  Has been compliant with her medications at home.  Denies drug or alcohol use.    03/10 - the patient has been visible, able to make her needs known and with no significant change in her mental status. She is compliant with medication and denies active

## 2025-03-11 LAB
25(OH)D3 SERPL-MCNC: 36.7 NG/ML (ref 30–100)
FOLATE SERPL-MCNC: 8.3 NG/ML (ref 5–21)
IRON SATN MFR SERPL: 48 % (ref 20–50)
IRON SERPL-MCNC: 69 UG/DL (ref 35–150)
TIBC SERPL-MCNC: 144 UG/DL (ref 250–450)
VIT B12 SERPL-MCNC: 560 PG/ML (ref 193–986)

## 2025-03-11 PROCEDURE — 6370000000 HC RX 637 (ALT 250 FOR IP): Performed by: INTERNAL MEDICINE

## 2025-03-11 PROCEDURE — 6370000000 HC RX 637 (ALT 250 FOR IP): Performed by: NURSE PRACTITIONER

## 2025-03-11 PROCEDURE — 6370000000 HC RX 637 (ALT 250 FOR IP): Performed by: PSYCHIATRY & NEUROLOGY

## 2025-03-11 PROCEDURE — 82607 VITAMIN B-12: CPT

## 2025-03-11 PROCEDURE — 82746 ASSAY OF FOLIC ACID SERUM: CPT

## 2025-03-11 PROCEDURE — 99232 SBSQ HOSP IP/OBS MODERATE 35: CPT | Performed by: PSYCHIATRY & NEUROLOGY

## 2025-03-11 PROCEDURE — 83540 ASSAY OF IRON: CPT

## 2025-03-11 PROCEDURE — 36415 COLL VENOUS BLD VENIPUNCTURE: CPT

## 2025-03-11 PROCEDURE — 1240000000 HC EMOTIONAL WELLNESS R&B

## 2025-03-11 PROCEDURE — 83550 IRON BINDING TEST: CPT

## 2025-03-11 PROCEDURE — 82306 VITAMIN D 25 HYDROXY: CPT

## 2025-03-11 PROCEDURE — 6370000000 HC RX 637 (ALT 250 FOR IP)

## 2025-03-11 RX ADMIN — TRAZODONE HYDROCHLORIDE 50 MG: 50 TABLET ORAL at 20:48

## 2025-03-11 RX ADMIN — ACETAMINOPHEN 650 MG: 325 TABLET ORAL at 15:33

## 2025-03-11 RX ADMIN — VENLAFAXINE HYDROCHLORIDE 150 MG: 100 TABLET ORAL at 17:28

## 2025-03-11 RX ADMIN — FOLIC ACID 1 MG: 1 TABLET ORAL at 08:38

## 2025-03-11 RX ADMIN — ACETAMINOPHEN 650 MG: 325 TABLET ORAL at 20:53

## 2025-03-11 RX ADMIN — VENLAFAXINE HYDROCHLORIDE 150 MG: 100 TABLET ORAL at 08:38

## 2025-03-11 RX ADMIN — MIRTAZAPINE 15 MG: 15 TABLET, FILM COATED ORAL at 20:48

## 2025-03-11 RX ADMIN — ARIPIPRAZOLE 10 MG: 5 TABLET ORAL at 08:37

## 2025-03-11 RX ADMIN — Medication 1000 UNITS: at 08:38

## 2025-03-11 RX ADMIN — THERA TABS 1 TABLET: TAB at 08:38

## 2025-03-11 RX ADMIN — CYANOCOBALAMIN TAB 500 MCG 1000 MCG: 500 TAB at 08:38

## 2025-03-11 ASSESSMENT — PAIN SCALES - GENERAL
PAINLEVEL_OUTOF10: 5
PAINLEVEL_OUTOF10: 3
PAINLEVEL_OUTOF10: 3

## 2025-03-11 ASSESSMENT — PAIN DESCRIPTION - DESCRIPTORS
DESCRIPTORS: ACHING
DESCRIPTORS: ACHING

## 2025-03-11 ASSESSMENT — PAIN DESCRIPTION - LOCATION
LOCATION: HEAD
LOCATION: HEAD

## 2025-03-11 ASSESSMENT — PAIN DESCRIPTION - ORIENTATION: ORIENTATION: OTHER (COMMENT)

## 2025-03-11 NOTE — PROGRESS NOTES
Laboratory Monitoring for Mood Stabilizers and Antipsychotics    Recommended baseline monitoring has been completed based on this patient's current medication regimen.     This patient is currently prescribed the following medication(s):   Current Facility-Administered Medications: mirtazapine (REMERON) tablet 15 mg, 15 mg, Oral, Nightly  traZODone (DESYREL) tablet 50 mg, 50 mg, Oral, Nightly PRN  ibuprofen (ADVIL;MOTRIN) tablet 600 mg, 600 mg, Oral, Q6H PRN  folic acid (FOLVITE) tablet 1 mg, 1 mg, Oral, Daily  vitamin B-12 (CYANOCOBALAMIN) tablet 1,000 mcg, 1,000 mcg, Oral, Daily  acetaminophen (TYLENOL) tablet 650 mg, 650 mg, Oral, Q4H PRN  polyethylene glycol (GLYCOLAX) packet 17 g, 17 g, Oral, Daily PRN  aluminum & magnesium hydroxide-simethicone (MAALOX PLUS) 200-200-20 MG/5ML suspension 30 mL, 30 mL, Oral, Q6H PRN  hydrOXYzine HCl (ATARAX) tablet 50 mg, 50 mg, Oral, TID PRN  haloperidol (HALDOL) tablet 5 mg, 5 mg, Oral, Q4H PRN **OR** haloperidol lactate (HALDOL) injection 5 mg, 5 mg, IntraMUSCular, Q4H PRN  diphenhydrAMINE (BENADRYL) injection 50 mg, 50 mg, IntraMUSCular, Q4H PRN  venlafaxine (EFFEXOR) tablet 150 mg, 150 mg, Oral, BID WC  ARIPiprazole (ABILIFY) tablet 10 mg, 10 mg, Oral, Daily  multivitamin 1 tablet, 1 tablet, Oral, Daily  Vitamin D (CHOLECALCIFEROL) tablet 1,000 Units, 1,000 Units, Oral, Daily      The following labs have been completed for monitoring of antipsychotics and/or mood stabilizers:    Height, Weight, BMI Estimation  Estimated body mass index is 19.97 kg/m² as calculated from the following:    Height as of this encounter: 1.651 m (5' 5\").    Weight as of this encounter: 54.4 kg (120 lb).     Vital Signs/Blood Pressure  /65   Pulse 67   Temp 98 °F (36.7 °C) (Oral)   Resp 16   Ht 1.651 m (5' 5\")   Wt 54.4 kg (120 lb)   SpO2 98%   BMI 19.97 kg/m²     Renal Function, Hepatic Function and Chemistry  Estimated Creatinine Clearance: 83 mL/min (based on SCr of 0.59  mg/dL).    Lab Results   Component Value Date/Time     03/07/2025 03:02 PM    K 3.6 03/07/2025 03:02 PM     03/07/2025 03:02 PM    CO2 28 03/07/2025 03:02 PM    ANIONGAP 12 03/07/2025 03:02 PM    GLUCOSE 140 03/07/2025 03:02 PM    BUN 9 03/07/2025 03:02 PM    CREATININE 0.59 03/07/2025 03:02 PM    BILITOT 0.9 03/07/2025 03:02 PM    GLOB 2.9 03/07/2025 03:02 PM    ALT 72 03/07/2025 03:02 PM     03/07/2025 03:02 PM    ALKPHOS 131 03/07/2025 03:02 PM       Glucose/Hemoglobin A1c  No results found for: \"GLU\", \"GLUCPOC\"  Lab Results   Component Value Date/Time    LABA1C <3.8 01/28/2025 06:55 AM    EAG Cannot be calculated 01/28/2025 06:55 AM       Hematology  Lab Results   Component Value Date/Time    WBC 11.7 03/07/2025 03:02 PM    RBC 4.21 03/07/2025 03:02 PM    HGB 14.7 03/07/2025 03:02 PM    HCT 44.0 03/07/2025 03:02 PM    .5 03/07/2025 03:02 PM    MCH 34.9 03/07/2025 03:02 PM    MCHC 33.4 03/07/2025 03:02 PM    RDW 14.0 03/07/2025 03:02 PM     03/07/2025 03:02 PM       Lipids  Lab Results   Component Value Date/Time    CHOL 96 01/28/2025 06:54 AM    TRIG 70 01/28/2025 06:54 AM    HDL 53 01/28/2025 06:54 AM    CHOLHDLRATIO 1.8 01/28/2025 06:54 AM       Thyroid Function  Lab Results   Component Value Date/Time    TSH 0.56 01/24/2025 03:37 AM       Monie Chavis, PharmD, BCPS, BCPP  Clinical Pharmacy Specialist, Behavioral Health  Desk: [phone] ([phone])  Pharmacy: [phone] ([phone])

## 2025-03-11 NOTE — BH NOTE
E/M Psychiatric Progress Note    Patient: Elvia Natarajan MRN: [id number]  SSN: [id number]    YOB: 1960  Age: 64 y.o.  Sex: female      Admit Date: 3/8/2025    LOS: 3 days     Chief Complaint: suicidal ideation    Subjective:     HPI / INTERVAL HISTORY:    Elvia Natarajan is a 64 y.o. White (non-) female with history of major depressive disorder and generalized anxiety disorder.  Admitted for worsening depression, anxiety and suicidal ideation.  Reports that she has been dealing with depression for many years but lately over the past few months she has been struggling a lot with depression and feeling her depression is getting out of control.  She was working as a  but has not been able to work since December 2024 due to her depression.  Has been feeling increasingly depressed specially over the past 3 to 4 weeks.  Has been feeling hopeless, helpless, was having suicidal ideation with no plan or intent.  Lacking motivation and energy to do things, losing interest in things she used to enjoy, has been withdrawn and isolative to herself, feeling fatigue and tired, difficulty with focus and concentration.  Mentioned having financial stress contributing to her depression, most recently she lost her dog suddenly about a week ago and denies any big trigger for her.    Also reported feeling anxious, worrying constantly, difficulty controlling her worry, ruminating a lot.  Denies panic attacks.  Sleeping relatively good.  Reported she did neuropsychological testing and was diagnosed with mild cognitive impairment recently.  Denies hallucinations or paranoia.  Denies HI.  Denies history of bipolar disorder, manic hypomanic episode.  Has been compliant with her medications at home.  Denies drug or alcohol use.    03/10 - the patient has been visible, able to make her needs known and with no significant change in her mental status. She is compliant with medication and denies active

## 2025-03-11 NOTE — BH NOTE
Behavioral Health Interdisciplinary Rounds     Patient Name: Elvia Natarajan Age: 64 y.o. Room/Bed:  321/01  Primary Diagnosis: Major depressive disorder, severe (HCC)  Admission Status: Voluntary     Readmission within 30 days: No  Power of  in place: No  Patient requires a blocked bed: No          Reason for blocked bed: n/a    Sleep hours: 7       Participation in Care/Groups:  Yes  Medication Compliant?: See MAR  PRNS (last 24 hours): See MAR   Restraints (last 24 hours):  No  Substance Abuse:  No    24 hour chart check complete: Yes    Patient goal(s) for today: Take medications as prescribed, , engage in unit activities, participate in hygiene/ADLs, and communicate needs to appropriate staff,    Treatment team focus/goals: MD adjust medications as appropriate, identify discharge planning needs, coordination of care,      Progress note:   Pt was met in the treatment room. Pt reports feeling \"subdued\". Pt denies SI/HI/AVH but endorses feelings of hopelessness that \"things will not get better\". Pt reports she continues to have poor concentration and some cognitive difficulties though states this is baseline at this point. Pt continues to endorse depression but reports she feels somewhat better than at admission. Pt has been observed in the milieu though mostly isolative to self.       Social work plan: Call family: Maria Teresa Horan ([phone]), this is patients identified support person    1124: Writer spoke to Maria Teresa, visit scheduled for Friday     Writer to schedule patient to have out patient medication management.    Writer called pt therapist, left  requesting call back for care coordination    Pt MSE was observed to be the following:  Level of consciousness:  within normal limits  Appearance:  well-appearing  Behavior/Motor:  no abnormalities noted  Attitude toward examiner:  cooperative and attentive  Speech:  normal rate and normal volume  Mood:  depressed  Affect:  mood congruent  Thought

## 2025-03-11 NOTE — PLAN OF CARE
Problem: Discharge Planning  Goal: Discharge to home or other facility with appropriate resources  3/10/2025 1236 by Carmelina Hurt, RN  Outcome: Not Progressing     Problem: Depression  Goal: Will be euthymic at discharge  Description: INTERVENTIONS:  1. Administer medication as ordered  2. Provide emotional support via 1:1 interaction with staff  3. Encourage involvement in milieu/groups/activities  4. Monitor for social isolation  3/10/2025 1236 by Carmelina Hurt, RN  Outcome: Not Progressing

## 2025-03-11 NOTE — GROUP NOTE
Group Therapy Note    Date: 3/11/2025    Group Start Time: 1400  Group End Time: 1500  Group Topic: Recreational    [facility]    Harmony Maciel        Group Therapy Note    Attendees: 6       Patient's Goal:  To concentrate on selected task    Notes:  Pt declined active participation,left session    Discipline Responsible: Recreational Therapist      Signature:  HARMONY MACIEL

## 2025-03-11 NOTE — BH NOTE
[id number]    Elvia is visible in the milieu. She is pleasant and cooperative during assessment.   She denies SI/HI/AVH, and anxiety. She rates her depression 6/10. She reports feeling \"calm, but fearful for the future. \"  She c/o having a headache and requested PRN Tylenol for pain and PRN Trazodone for sleep. Medication administered along with her scheduled meds, with no issue.    2100 Patient wearing CPAP. Patient placed on LOS for safety.     0600 No acute changes overnight. Patient slept for 7 hours this shift.

## 2025-03-11 NOTE — PLAN OF CARE
At 0730 Elvia was encountered in her room lying in bed, she was alert and oriented Patient calm and cooperative with assessment. Upon assessment she endorsed anxiety and depression, denied SI, HI, and A/V hallucinations. Vital signs within normal parameters. CSSR score of No Risk this shift. Patient compliant with scheduled medication. Q15 minute observations maintained. Will continue to monitor.     Problem: Self Harm/Suicidality  Goal: Will have no self-injury during hospital stay  Description: INTERVENTIONS:  1.  Ensure constant observer at bedside with Q15M safety checks  2.  Maintain a safe environment  3.  Secure patient belongings  4.  Ensure family/visitors adhere to safety recommendations  5.  Ensure safety tray has been added to patient's diet order  6.  Every shift and PRN: Re-assess suicidal risk via Frequent Screener    Outcome: Progressing  Flowsheets (Taken 3/11/2025 1415)  Will have no self-injury during hospital stay: Maintain a safe environment

## 2025-03-11 NOTE — GROUP NOTE
Group Therapy Note    Date: 3/11/2025    Group Start Time: 1000  Group End Time: 1045  Group Topic: Relaxation    [facility]    Harmony Maciel        Group Therapy Note    Attendees: 4       Patient's Goal:  To participate in relaxation activity    Notes:  Pt did not attend session  Discipline Responsible: Recreational Therapist      Signature:  HARMONY MACIEL

## 2025-03-12 PROCEDURE — 99232 SBSQ HOSP IP/OBS MODERATE 35: CPT | Performed by: PSYCHIATRY & NEUROLOGY

## 2025-03-12 PROCEDURE — 6370000000 HC RX 637 (ALT 250 FOR IP): Performed by: INTERNAL MEDICINE

## 2025-03-12 PROCEDURE — 1240000000 HC EMOTIONAL WELLNESS R&B

## 2025-03-12 PROCEDURE — 6370000000 HC RX 637 (ALT 250 FOR IP): Performed by: PSYCHIATRY & NEUROLOGY

## 2025-03-12 PROCEDURE — 6370000000 HC RX 637 (ALT 250 FOR IP)

## 2025-03-12 RX ORDER — BUTALBITAL, ACETAMINOPHEN AND CAFFEINE 50; 325; 40 MG/1; MG/1; MG/1
1 TABLET ORAL EVERY 4 HOURS PRN
Status: DISCONTINUED | OUTPATIENT
Start: 2025-03-12 | End: 2025-03-14 | Stop reason: HOSPADM

## 2025-03-12 RX ADMIN — VENLAFAXINE HYDROCHLORIDE 150 MG: 100 TABLET ORAL at 17:00

## 2025-03-12 RX ADMIN — Medication 1000 UNITS: at 09:21

## 2025-03-12 RX ADMIN — MIRTAZAPINE 15 MG: 15 TABLET, FILM COATED ORAL at 20:51

## 2025-03-12 RX ADMIN — FOLIC ACID 1 MG: 1 TABLET ORAL at 09:21

## 2025-03-12 RX ADMIN — CYANOCOBALAMIN TAB 500 MCG 1000 MCG: 500 TAB at 09:21

## 2025-03-12 RX ADMIN — ARIPIPRAZOLE 10 MG: 5 TABLET ORAL at 09:20

## 2025-03-12 RX ADMIN — VENLAFAXINE HYDROCHLORIDE 150 MG: 100 TABLET ORAL at 09:21

## 2025-03-12 RX ADMIN — THERA TABS 1 TABLET: TAB at 09:21

## 2025-03-12 ASSESSMENT — PAIN SCALES - GENERAL
PAINLEVEL_OUTOF10: 0
PAINLEVEL_OUTOF10: 0

## 2025-03-12 ASSESSMENT — PAIN SCALES - WONG BAKER: WONGBAKER_NUMERICALRESPONSE: HURTS A LITTLE BIT

## 2025-03-12 NOTE — PLAN OF CARE
Problem: Self Harm/Suicidality  Goal: Will have no self-injury during hospital stay  Description: INTERVENTIONS:  1.  Ensure constant observer at bedside with Q15M safety checks  2.  Maintain a safe environment  3.  Secure patient belongings  4.  Ensure family/visitors adhere to safety recommendations  5.  Ensure safety tray has been added to patient's diet order  6.  Every shift and PRN: Re-assess suicidal risk via Frequent Screener    3/12/2025 0114 by Miguel Worrell, RN  Outcome: Progressing     Problem: Depression  Goal: Will be euthymic at discharge  Description: INTERVENTIONS:  1. Administer medication as ordered  2. Provide emotional support via 1:1 interaction with staff  3. Encourage involvement in milieu/groups/activities  4. Monitor for social isolation  Outcome: Not Progressing     Problem: Anxiety  Goal: Will report anxiety at manageable levels  Description: INTERVENTIONS:  1. Administer medication as ordered  2. Teach and rehearse alternative coping skills  3. Provide emotional support with 1:1 interaction with staff  Outcome: Progressing  Flowsheets (Taken 3/11/2025 1415 by Fermin Alvarado, RN)  Will report anxiety at manageable levels: Administer medication as ordered

## 2025-03-12 NOTE — BH NOTE
Behavioral Health Interdisciplinary Rounds     Patient Name: Elvia Natarajan Age: 64 y.o. Room/Bed:  321/01  Primary Diagnosis: Major depressive disorder, severe (HCC)  Admission Status: Voluntary     Readmission within 30 days: No  Power of  in place: No  Patient requires a blocked bed: No          Reason for blocked bed: n/a    Sleep hours: 8       Participation in Care/Groups:  Yes  Medication Compliant?: See MAR  PRNS (last 24 hours): See MAR   Restraints (last 24 hours):  No  Substance Abuse:  No    24 hour chart check complete: Yes    Patient goal(s) for today: Take medications as prescribed, , engage in unit activities, participate in hygiene/ADLs, and communicate needs to appropriate staff,    Treatment team focus/goals: MD adjust medications as appropriate, identify discharge planning needs, coordination of care,    Progress note:   Pt was met in the treatment room. Pt denies SI/HI/AVH. Pt endorses depression, denies anxiety. Pt denies untoward side effects of side effects. Discussed pt support system, pt reports she feels very suyapa. Discussed following up with case management through [facility] and the benefits, pt voices understanding. Discussed importance of routine and following up with out patient resources, pt voices understanding. Pt is calm and cooperative, visible in the milieu though somewhat isolative to self. Pt is neatly groomed, appears to be attending to ADLs. Pt continues to have some issues with appetite but is making sure to eat and drink supplements.       Social work plan: Call outpatient provider: Follow up with out patient therapist to see if pt is still client    Writer scheduled appointment for medication management     Pt MSE was observed to be the following:  Level of consciousness:  within normal limits  Appearance:  well-appearing  Behavior/Motor:  no abnormalities noted  Attitude toward examiner:  cooperative and attentive  Speech:  soft  Mood:  depressed  Affect:  mood

## 2025-03-12 NOTE — PLAN OF CARE
Problem: Depression  Goal: Will be euthymic at discharge  Description: INTERVENTIONS:  1. Administer medication as ordered  2. Provide emotional support via 1:1 interaction with staff  3. Encourage involvement in milieu/groups/activities  4. Monitor for social isolation  3/12/2025 1430 by Uma Delgado, RN  Outcome: Progressing  3/12/2025 0114 by Miguel Worrell, RN  Outcome: Not Progressing

## 2025-03-12 NOTE — PROGRESS NOTES
Patient actively participated in Spirituality Group about coping skills in the Behavioral Health unit.  utilized music, lyrics to favorite songs, words of encouragement and affirmation, scripture, and testimony to facilitate the group discussion and enhance group dynamics.  Rev. Zoe Jaquez MDiv,

## 2025-03-12 NOTE — PROGRESS NOTES
GROUP THERAPY PROGRESS NOTE      GROUP TIME: Wednesday 2-2:45 PM    NUMBER OF PARTICIPANTS: 4    PERSONAL GOAL FOR PARTICIPATION: To be present for group, participate in discussion, and answer patient-directed questions.    GOAL ORIENTATION: Personal    THERAPEUTIC INTERVENTIONS REVIEWED AND DISCUSSED: The following topics were presented: storage of medications, how to remember to refill medications and keep up with doctor appointments, relapse prevention, keeping a record of all medication including prescription and non-prescription drugs, and who to contact with medication questions. Patients were given time to ask questions regarding their current therapy.    IMPRESSION OF PARTICIPATION: Elvia Natarajan was not present for medication group.      Monie Chavis [facility]

## 2025-03-12 NOTE — BH NOTE
E/M Psychiatric Progress Note    Patient: Elvia Natarajan MRN: [id number]  SSN: [id number]    YOB: 1960  Age: 64 y.o.  Sex: female      Admit Date: 3/8/2025    LOS: 4 days     Chief Complaint: suicidal ideation    Subjective:     HPI / INTERVAL HISTORY:    Elvia Natarajan is a 64 y.o. White (non-) female with history of major depressive disorder and generalized anxiety disorder.  Admitted for worsening depression, anxiety and suicidal ideation.  Reports that she has been dealing with depression for many years but lately over the past few months she has been struggling a lot with depression and feeling her depression is getting out of control.  She was working as a  but has not been able to work since December 2024 due to her depression.  Has been feeling increasingly depressed specially over the past 3 to 4 weeks.  Has been feeling hopeless, helpless, was having suicidal ideation with no plan or intent.  Lacking motivation and energy to do things, losing interest in things she used to enjoy, has been withdrawn and isolative to herself, feeling fatigue and tired, difficulty with focus and concentration.  Mentioned having financial stress contributing to her depression, most recently she lost her dog suddenly about a week ago and denies any big trigger for her.    Also reported feeling anxious, worrying constantly, difficulty controlling her worry, ruminating a lot.  Denies panic attacks.  Sleeping relatively good.  Reported she did neuropsychological testing and was diagnosed with mild cognitive impairment recently.  Denies hallucinations or paranoia.  Denies HI.  Denies history of bipolar disorder, manic hypomanic episode.  Has been compliant with her medications at home.  Denies drug or alcohol use.    03/10 - the patient has been visible, able to make her needs known and with no significant change in her mental status. She is compliant with medication and denies active  thoughts of self harm. Patient with fair insight into her situation. She reports some cognitive impairment with regard with her home environment, and states that her therapist had recommended a higher level of care due to concerns about her self care. Discussed medication regimen, which she states that she takes at times at sleep. She reports fair sleep and got 7.5 hours overnight per RN report. Labwork reviewed, CBC notable for macrocytic anemia (.5, Hb 11.7). Per pharmacist she has a history of bariatric surgery which has led to malabsorption but this was well controlled.     03/11 - no acute overnight events. The patient has been isolative, able to make her needs known and with no change in her mood or affect. Patient denies active thoughts of self harm but continues to endorse anhedonia as well as a poor outlook. Patient slept 7 hours overnight and has been in fair behavioral control. She is medication compliant and got no PRNs for agitation. Labwork drawn, pending (vitamin panel). She is withdrawn and flat, but cooperative with the treatment plan. She states her mood is \"subdued\" this morning and endorses hopelessness.     03/12 - the patient is unchanged. She slept 8 hours overnight and has been visible, cooperative and with no episodes of self harm ideation. She is oriented to situation, guarded on interview and with fair ADLs. Patient denies SI/HI/AVH/PI, she is dysphoric at times and with fair insight into her condition. Patient medication compliant, she has been able to make her needs known and will have a visit with a friend prior to discharge, tentatively for the end of this week. She continues to report feeling depressed, her anxiety is lessened. She reports fair sleep and denies significant side effects from the medication. She is tolerating Abilify increase thus far, affect remains flat.     Objective:     VITAL SIGNS:  Vitals:    03/10/25 0800 03/10/25 2245 03/11/25 1415 03/11/25 2027   BP:

## 2025-03-12 NOTE — BH NOTE
Met with patient lying down resting in bed. Flat affect. Depressed mood. Denies anxiety. Denies SI, HI and AVH. CSSRS No Risk. Sleeping well. Appetite fair. Guarded and isolative to self/room. Compliant with medications. No side effects reported. Remains on Q15 minute rounds for safety.

## 2025-03-12 NOTE — GROUP NOTE
Group Therapy Note    Date: 3/12/2025    Group Start Time: 1500  Group End Time: 1600  Group Topic: Recreational    [facility]    Harmony Maciel        Group Therapy Note    Attendees: 4       Patient's Goal:  To concentrate on selected task    Notes:  Pt did not attend session      Discipline Responsible: Recreational Therapist      Signature:  HARMONY MACIEL

## 2025-03-12 NOTE — GROUP NOTE
Group Therapy Note    Date: 3/12/2025    Group Start Time: 1000  Group End Time: 1045  Group Topic: Relaxation    [facility]    Harmony Maciel        Group Therapy Note    Attendees: 6       Patient's Goal:  To participate in relaxation activity    Notes:  Pt did not attend session    Discipline Responsible: Recreational Therapist      Signature:  HARMONY MACIEL

## 2025-03-13 PROCEDURE — 6370000000 HC RX 637 (ALT 250 FOR IP): Performed by: PSYCHIATRY & NEUROLOGY

## 2025-03-13 PROCEDURE — 6370000000 HC RX 637 (ALT 250 FOR IP)

## 2025-03-13 PROCEDURE — 99232 SBSQ HOSP IP/OBS MODERATE 35: CPT | Performed by: PSYCHIATRY & NEUROLOGY

## 2025-03-13 PROCEDURE — 6370000000 HC RX 637 (ALT 250 FOR IP): Performed by: INTERNAL MEDICINE

## 2025-03-13 PROCEDURE — 1240000000 HC EMOTIONAL WELLNESS R&B

## 2025-03-13 RX ADMIN — FOLIC ACID 1 MG: 1 TABLET ORAL at 08:55

## 2025-03-13 RX ADMIN — THERA TABS 1 TABLET: TAB at 08:57

## 2025-03-13 RX ADMIN — ARIPIPRAZOLE 10 MG: 5 TABLET ORAL at 08:57

## 2025-03-13 RX ADMIN — CYANOCOBALAMIN TAB 500 MCG 1000 MCG: 500 TAB at 08:57

## 2025-03-13 RX ADMIN — VENLAFAXINE HYDROCHLORIDE 150 MG: 100 TABLET ORAL at 16:51

## 2025-03-13 RX ADMIN — TRAZODONE HYDROCHLORIDE 50 MG: 50 TABLET ORAL at 20:35

## 2025-03-13 RX ADMIN — Medication 1000 UNITS: at 08:57

## 2025-03-13 RX ADMIN — MIRTAZAPINE 15 MG: 15 TABLET, FILM COATED ORAL at 20:35

## 2025-03-13 RX ADMIN — VENLAFAXINE HYDROCHLORIDE 150 MG: 100 TABLET ORAL at 08:55

## 2025-03-13 ASSESSMENT — PAIN - FUNCTIONAL ASSESSMENT: PAIN_FUNCTIONAL_ASSESSMENT: NONE - DENIES PAIN

## 2025-03-13 ASSESSMENT — PAIN SCALES - GENERAL: PAINLEVEL_OUTOF10: 0

## 2025-03-13 NOTE — BH NOTE
Behavioral Health Interdisciplinary Rounds     Patient Name: Elvia Natarajan Age: 64 y.o. Room/Bed:  321/01  Primary Diagnosis: Major depressive disorder, severe (HCC)  Admission Status: Voluntary     Readmission within 30 days: No  Power of  in place: No  Patient requires a blocked bed: No          Reason for blocked bed: n/a    Sleep hours: 8.5       Participation in Care/Groups:  Yes  Medication Compliant?: See MAR  PRNS (last 24 hours): See MAR   Restraints (last 24 hours):  No  Substance Abuse:  No    24 hour chart check complete: Yes    Patient goal(s) for today: Take medications as prescribed, , engage in unit activities, participate in hygiene/ADLs, and communicate needs to appropriate staff,   Treatment team focus/goals: MD adjust medications as appropriate, identify discharge planning needs, coordination of care,     Progress note:   Pt was met in the treatment room. Pt denies SI/HI/AVH. Pt endorses some anxiety, reports she continues to feel someone subdued. Pt endorses anxiety over financial stressors though states this is common. Pt noted to be interacting appropriately in the milieu. Discussed out patient follow up plan, pt voices agreement. ADLs appear fair, able to make her needs known and denies acute distress.      Social work plan: Writer to make referral for medication management, pt reports she has been seen by psych NPs in the past and has been told she needs higher level of care, writer to refer pt to Dr. Moncada outpatient, but scheduled with NP @ [facility] until she can be seen.    Writer to make referral for PCP.       Pt MSE was observed to be the following:  Level of consciousness:  within normal limits  Appearance:  well-appearing  Behavior/Motor:  no abnormalities noted  Attitude toward examiner:  cooperative and attentive  Speech:  soft  Mood:  depressed  Affect:  mood congruent  Thought processes:  coherent  Thought content:  WNL  Cognition:  Insight:  fair  Judgment:   Pt states that morphine does not help with his pain, he wants another dose. Pt also wants dilaudid. Pt rocking back and forth in stretcher, provided a warm blanket.

## 2025-03-13 NOTE — GROUP NOTE
Group Therapy Note    Date: 3/13/2025    Group Start Time: 1000  Group End Time: 1045  Group Topic: Relaxation    [facility]    Harmony Maciel        Group Therapy Note    Attendees: 7       Patient's Goal:  To participate in relaxation activity    Notes:  Pt did not attend session  Discipline Responsible: Recreational Therapist      Signature:  HARMONY MACIEL

## 2025-03-13 NOTE — BH NOTE
self harm. Patient denies SI/HI/AVH/PI and reports ongoing depression and anxiety. She is eating most of her meals and working with SW on disposition. Patient with good insight into her situation. She is medication compliant and discharge focused. She is cooperative, calm on interview and able to make her needs known. Plan for discharge tomorrow with increased community support.    Objective:     VITAL SIGNS:  Vitals:    03/11/25 1415 03/11/25 2027 03/12/25 1030 03/12/25 2051   BP: 100/65 114/67 105/63 122/89   Pulse: 67 71 80 70   Resp: 16 16 18 18   Temp: 98 °F (36.7 °C) 98.8 °F (37.1 °C) 98.8 °F (37.1 °C) 97.7 °F (36.5 °C)   TempSrc: Oral Oral Oral Oral   SpO2: 98% 98% 97% 100%   Weight:       Height:           MENTAL STATUS EXAMINATION: MSE findings are within normal limits (WNL) unless otherwise stated below. (all of the below categories of the MSE have been reviewed (reviewed 3/13/2025) and updated as deemed appropriate)    General Presentation age appropriate and within normal Limits, cooperative   Orientation oriented to time, place and person   Language No aphasia or dysarthria   Speech normal volume   Thought Processes normal rate of thoughts, fair abstract reasoning/computation   Thought Content preoccupations and poverty of content   Suicidal Ideations contracts for safety   Homicidal Ideations contracts for safety   Mood:  depressed   Affect:  Constricted   Memory recent  fair   Concentration/Attention:  fair   Fund of Knowledge average   Insight and Judgment: impaired due to suicidal ideation       MEDICATIONS:   mirtazapine  15 mg Oral Nightly    folic acid  1 mg Oral Daily    vitamin B-12  1,000 mcg Oral Daily    venlafaxine  150 mg Oral BID WC    ARIPiprazole  10 mg Oral Daily    multivitamin  1 tablet Oral Daily    Vitamin D  1,000 Units Oral Daily        LAB RESULTS: (reviewed/updated 3/13/2025)  Admission on 03/08/2025   Component Date Value Ref Range Status    Vitamin B-12 03/11/2025 560  193 -  986 pg/mL Final    Folate 03/11/2025 8.3  5.0 - 21.0 ng/mL Final    Iron 03/11/2025 69  35 - 150 ug/dL Final    TIBC 03/11/2025 144 (L)  250 - 450 ug/dL Final    Iron % Saturation 03/11/2025 48  20 - 50 % Final    Vit D, 25-Hydroxy 03/11/2025 36.7  30 - 100 ng/mL Final         Assessment & Plan     Dx: MDD, severe with psychosis. Patient with increasing financial and personal setbacks, now with worsening difficulty with ADLs and suicidal ideation. Patient with marked isolation and few community supports. Malnutrition is prominent and MCV grossly elevated. Will adjust regimen.    Plan:  - CONTINUE Effexor 150 mg BID for MDD  - CONTINUE Remeron 15 mg QHS for depression adjunct, insomnia  - CONTINUE Abilify 10 mg QDAY for depression adjunct  - MOCA exam today  - IGM Therapy as tolerated  - Expand Database / Obtain collateral  - Dispo planning (home when stable)    I certify that this patient's inpatient psychiatric hospital services furnished since the previous certification were, and continue to be,required for treatment that could reasonably be expected to improve the patient's condition, or for diagnostic study, and that the patient continues to need, on a daily basis, active treatment furnished directly by or requiring the supervision of inpatient psychiatric facility personnel. In addition, the hospital records show that services furnished were intensive treatment services, admission or related services, or equivalent services.    Signed By: Candie Moncada MD     March 13, 2025

## 2025-03-13 NOTE — PLAN OF CARE
Problem: Pain  Goal: Verbalizes/displays adequate comfort level or baseline comfort level  Outcome: Progressing     Problem: Self Harm/Suicidality  Goal: Will have no self-injury during hospital stay  Description: INTERVENTIONS:  1.  Ensure constant observer at bedside with Q15M safety checks  2.  Maintain a safe environment  3.  Secure patient belongings  4.  Ensure family/visitors adhere to safety recommendations  5.  Ensure safety tray has been added to patient's diet order  6.  Every shift and PRN: Re-assess suicidal risk via Frequent Screener    3/13/2025 0336 by Maria Teresa Boyd RN  Outcome: Progressing  3/12/2025 1430 by Uma Delgado RN  Outcome: Progressing     Problem: Depression  Goal: Will be euthymic at discharge  Description: INTERVENTIONS:  1. Administer medication as ordered  2. Provide emotional support via 1:1 interaction with staff  3. Encourage involvement in milieu/groups/activities  4. Monitor for social isolation  3/13/2025 0336 by Maria Teresa Boyd RN  Outcome: Progressing  3/12/2025 1430 by Uma Delgado RN  Outcome: Progressing     Problem: Anxiety  Goal: Will report anxiety at manageable levels  Description: INTERVENTIONS:  1. Administer medication as ordered  2. Teach and rehearse alternative coping skills  3. Provide emotional support with 1:1 interaction with staff  3/12/2025 1430 by Uma Delgado RN  Outcome: Progressing

## 2025-03-13 NOTE — PROGRESS NOTES
7p-7a Nurse Reassessment Note:  Pt alert and oriented x4, visible on the unit in the dayroom watch TV with peers. Calm and cooperative with flat affect. Denies SI/HI/AVH. Pt reports having no pain or anxiety. Compliant with scheduled medication with acute distress observed. Pt appeared to have slept approximately 8.5 hours during the night. Staff will continue hourly rounds and Q-15 minutes checks maintained during shift for care and safety.

## 2025-03-13 NOTE — GROUP NOTE
Group Therapy Note    Date: 3/13/2025    Group Start Time: 1400  Group End Time: 1500  Group Topic: Recreational    [facility]    Harmony Maciel        Group Therapy Note    Attendees: 9       Patient's Goal:  To concentrate on selected task    Notes:  Pt did not attend session    Discipline Responsible: Recreational Therapist      Signature:  HARMONY MACIEL

## 2025-03-13 NOTE — PLAN OF CARE
Problem: Discharge Planning  Goal: Discharge to home or other facility with appropriate resources  Outcome: Progressing     Problem: Depression  Goal: Will be euthymic at discharge  Description: INTERVENTIONS:  1. Administer medication as ordered  2. Provide emotional support via 1:1 interaction with staff  3. Encourage involvement in milieu/groups/activities  4. Monitor for social isolation  3/13/2025 1103 by Chauncey Dai, RN  Outcome: Progressing

## 2025-03-13 NOTE — BH NOTE
Morning assessment complete. Patient was encountered resting in her bed following breakfast.   She is calm and cooperative.   Pt A&Ox4. Speech is clear, Ambulation is independent.  Patient reports that they are currently experiencing feelings of depression 5/10 and generalized anxiety of 4/10.   Patient currently denies pain.  Eye contact is noted to be fair.   Patient reports that last night's sleep was restful with 8.5 hrs.   Mood is noted to be depressed and anxious.  Pt remains mainly in bed, isolative to her room.  Affect is congruent/appropriate.   VS are stable stable.  Patient currently denies all SI/HI, AVH.   Patient is both med and meal compliant. There are no untoward side effects from medications to note.   Patient reports that they have set a goal to accomplish preparing for discharge tomorrow.    C-SSRS level is NO RISK    Q15 minute rounds are being completed to assure patient safety and attend to care needs. Monitoring continues for changes in patient condition.

## 2025-03-14 VITALS
WEIGHT: 120 LBS | DIASTOLIC BLOOD PRESSURE: 72 MMHG | TEMPERATURE: 98.2 F | HEIGHT: 65 IN | HEART RATE: 81 BPM | SYSTOLIC BLOOD PRESSURE: 120 MMHG | BODY MASS INDEX: 19.99 KG/M2 | RESPIRATION RATE: 16 BRPM | OXYGEN SATURATION: 97 %

## 2025-03-14 PROCEDURE — 6370000000 HC RX 637 (ALT 250 FOR IP)

## 2025-03-14 PROCEDURE — 6370000000 HC RX 637 (ALT 250 FOR IP): Performed by: PSYCHIATRY & NEUROLOGY

## 2025-03-14 PROCEDURE — 6370000000 HC RX 637 (ALT 250 FOR IP): Performed by: INTERNAL MEDICINE

## 2025-03-14 PROCEDURE — 99239 HOSP IP/OBS DSCHRG MGMT >30: CPT | Performed by: PSYCHIATRY & NEUROLOGY

## 2025-03-14 RX ORDER — VENLAFAXINE 75 MG/1
150 TABLET ORAL 2 TIMES DAILY WITH MEALS
Qty: 120 TABLET | Refills: 1 | Status: SHIPPED | OUTPATIENT
Start: 2025-03-14

## 2025-03-14 RX ORDER — HYDROXYZINE HYDROCHLORIDE 50 MG/1
50 TABLET, FILM COATED ORAL 2 TIMES DAILY PRN
Qty: 60 TABLET | Refills: 1 | Status: SHIPPED | OUTPATIENT
Start: 2025-03-14 | End: 2025-05-13

## 2025-03-14 RX ORDER — ARIPIPRAZOLE 10 MG/1
10 TABLET ORAL DAILY
Qty: 30 TABLET | Refills: 1 | Status: SHIPPED | OUTPATIENT
Start: 2025-03-15

## 2025-03-14 RX ORDER — TRAZODONE HYDROCHLORIDE 50 MG/1
50 TABLET ORAL NIGHTLY PRN
Qty: 30 TABLET | Refills: 1 | Status: SHIPPED | OUTPATIENT
Start: 2025-03-14

## 2025-03-14 RX ORDER — MIRTAZAPINE 15 MG/1
15 TABLET, FILM COATED ORAL NIGHTLY
Qty: 30 TABLET | Refills: 1 | Status: SHIPPED | OUTPATIENT
Start: 2025-03-14

## 2025-03-14 RX ADMIN — VENLAFAXINE HYDROCHLORIDE 150 MG: 100 TABLET ORAL at 09:26

## 2025-03-14 RX ADMIN — THERA TABS 1 TABLET: TAB at 09:26

## 2025-03-14 RX ADMIN — CYANOCOBALAMIN TAB 500 MCG 1000 MCG: 500 TAB at 09:26

## 2025-03-14 RX ADMIN — Medication 1000 UNITS: at 09:26

## 2025-03-14 RX ADMIN — FOLIC ACID 1 MG: 1 TABLET ORAL at 09:26

## 2025-03-14 RX ADMIN — ARIPIPRAZOLE 10 MG: 5 TABLET ORAL at 09:26

## 2025-03-14 ASSESSMENT — PAIN SCALES - WONG BAKER: WONGBAKER_NUMERICALRESPONSE: NO HURT

## 2025-03-14 ASSESSMENT — PAIN SCALES - GENERAL: PAINLEVEL_OUTOF10: 0

## 2025-03-14 NOTE — BH NOTE
Behavioral Health Transition Record    Patient Name: Elvia Natarajan  YOB: 1960   Medical Record Number: [id number]  Date of Admission: 3/8/2025  7:04 PM   Date of Discharge: 03/14/25     Attending Provider: No att. providers found   Discharging Provider: Candie Moncada MD   To contact this individual call [phone] and ask the  to page.  If unavailable, ask to be transferred to Behavioral Health Provider on call.  A Behavioral Health Provider will be available on call 24/7 and during holidays.    Primary Care Provider: Dima Hammonds MD    Allergies   Allergen Reactions    Sulfa Antibiotics      Other reaction(s): Unknown (comments)  Eyes turned bright red       Reason for Admission: Elvia Natarajan is a 64 y.o. White (non-) female with history of major depressive disorder and generalized anxiety disorder.  Admitted for worsening depression, anxiety and suicidal ideation.  Reports that she has been dealing with depression for many years but lately over the past few months she has been struggling a lot with depression and feeling her depression is getting out of control.  She was working as a  but has not been able to work since December 2024 due to her depression.  Has been feeling increasingly depressed specially over the past 3 to 4 weeks.  Has been feeling hopeless, helpless, was having suicidal ideation with no plan or intent.  Lacking motivation and energy to do things, losing interest in things she used to enjoy, has been withdrawn and isolative to herself, feeling fatigue and tired, difficulty with focus and concentration.  Mentioned having financial stress contributing to her depression, most recently she lost her dog suddenly about a week ago and denies any big trigger for her.     Also reported feeling anxious, worrying constantly, difficulty controlling her worry, ruminating a lot.  Denies panic attacks.  Sleeping relatively good.  Reported  by mouth in the morning and at bedtime     thiamine 100 MG tablet  Take 1 tablet by mouth daily     Vitamin D 25 MCG (1000 UT) Tabs tablet  Commonly known as: CHOLECALCIFEROL  Take 3 tablets by mouth daily            STOP taking these medications      methylphenidate 5 MG tablet  Commonly known as: RITALIN     Zenpep [id number] units Cpep  Generic drug: Pancrelipase (Lip-Prot-Amyl)               Where to Get Your Medications        These medications were sent to [place], [place] - [address] [facility] - P [phone] - F [phone]  [address] [facility] [address]      Phone: [phone]   ARIPiprazole 10 MG tablet  hydrOXYzine HCl 50 MG tablet  mirtazapine 15 MG tablet  traZODone 50 MG tablet  venlafaxine 75 MG tablet         Unresulted Labs (24h ago, onward)      None            To obtain results of studies pending at discharge, please contact [phone] option 4.     Follow-up Information       Follow up With Specialties Details Why Contact Info    [facility]  Follow up on 4/2/2025 You have an appointment at 3 PM in person for medication management [address]  [place] [address] [phone]    [facility]  Follow up Same Day Access     Walk-in Hours Monday - Wednesday 7:30am - 3:00pm     Thursday 7:30am - 5:30pm      Friday 7:30am - 11:00am     Walk-in Locations:      [address] [place] [address] [address] [place] [address]     Please bring:      A photo ID      Social Security Number      Insurance card (and applicable co-payment) or proof of income      List of all medications, dosages and prescribing doctor      List of physical health conditions      Name, address, and phone number for an emergency       Name, address, and phone number for Primary Care Physician     For further information, please call: [phone] [address] [facility]

## 2025-03-14 NOTE — GROUP NOTE
Group Therapy Note    Date: 3/14/2025    Group Start Time: 1000  Group End Time: 1045  Group Topic: Relaxation    [facility]    Harmony Maciel        Group Therapy Note    Attendees: 7       Patient's Goal:  To participate in relaxation activity    Notes:  Pt did not attend session  Discipline Responsible: Recreational Therapist      Signature:  HARMONY MACIEL

## 2025-03-14 NOTE — DISCHARGE INSTRUCTIONS
of the following toll-free 24-hour crisis hotlines for suicide prevention and support. Your call is free and confidential.   National Suicide Prevention Lifeline at 988, www.suicidepreventionlifeline.org   The National Hopeline Network at 8-213-796-HOPE (3961)   You may also contact Prieto Battery at www.Nextworth to access a live online network of volunteers trained and certified in crisis intervention or text HOME to 924700 to be connected with a live, trained crisis counselor through Crisis Text Line who will help you move from a hot moment to a cool moment.     OTHER HOTLINES:  Adult Abuse & Neglect 24 Hour Hotline: 3 (638) 233-8353   Alcohol & Drug Abuse 24 Hour Hotline: 1 (065) ALCOHOL (380-850-2095)   Alcohol & Drug Abuse 24 Hour Hotline: 1 (669) 577-3531   Central Intake for Emergency Shelter/Transitional Housing: -Men: (394) 710-3036; Women & Families: (443) 614-1248   [facility]: [phone] [facility] (domestic violence): (568) 140-2722   Crisis Text Line 017-875   [place] Child Abuse & Neglect 24 Hour Hotline: 1 (572) 797-3473   Child Find of [place]: 1(073) I AM LOST (433-288-2985)   Child Help [place]: 1(999) 4-A-CHILD (537-061-9337)   Cocaine Hotline: 1(047) COCAINE (573-858-9356)   Electronic Benefits Transfer Customer Service 24 hour Help-Line: 1-238.753.7003      EMERGENCY:   If you are experiencing an emergency, please call 911 or go to the nearest emergency room. If you are experiencing a mental health crisis, please call [facility] 24/7 crisis services at [phone].     MEDICAID-FUNDED CRISIS STABILIZATION AND MENTAL HEALTH SKILL BUILDING: Crisis stabilization services provide short term intensive mental health care to individuals who are experiencing an acute psychiatric crisis. The goal is to address and stabilize mental health needs as early as possible. Mental health skill building services are provided for individuals 18 years and older who  [facility], [address] [place] [address], [phone]   [facility], [address] [place] [address], [phone]   [facility], [address] [place] [address], [phone]   [facility], [address] [place] [address], [phone]   [facility], [address] [place] [address], [phone]   [facility], [address] [place] [address], [phone]   [facility], [address] [place] [address], [phone]   [facility], [address] [place] [address]      GENERAL MENTAL HEALTH QUESTIONS:   Please consider calling the Mental Health [place] ([facility]) Warmline at 1-488.954.7275 to receive support or learn about additional resources in your area. The [facility] also holds educational and supportive meetings for persons who are recovering from mental health issues. The phone line is available from 9:00 AM to 9:00 PM Monday - Friday, and 5:00 PM to 9:00 PM Saturday and Sunday. If you prefer to text, please text to [phone] anytime from 5:00 PM to 9:00 PM Wednesday, Friday, or Saturday.      SUBSTANCE USE TREATMENT, RECOVERY MEETINGS, AND GROUPS:   Participating in 12-step groups can help support your sobriety and prevent relapse. Please consider attending Alcoholics Anonymous meetings for alcohol use treatment. AA meetings in your area can be located by visiting www.aa.org or www.a[place].org or by contacting [employer] at 700-866-0903 or www.Wattio.org, or you may call the Substance Abuse and Mental Health Services Hotline at 2-238-848-HHOC (3757). Getting a sponsor for coaching and guidance can also be helpful. Please ask for a sponsor when you attend your next meeting. We recommend attending 90 meetings in 90 days.      Please ask your family, friends, and caregivers to participate in Al-Anon. Al-Anon members are those concerned

## 2025-03-14 NOTE — DISCHARGE SUMMARY
PSYCHIATRIC DISCHARGE SUMMARY         IDENTIFICATION:    Patient Name  Elvia Natarajan   Date of Birth 1960   [facility] [id number]   Medical Record Number  [id number]      Age  64 y.o.   PCP Dima Hammonds MD   Admit date:  3/8/2025    Discharge date: 3/14/2025   Room Number  321/01  @ [facility]   Date of Service  3/14/2025            TYPE OF DISCHARGE: REGULAR               CONDITION AT DISCHARGE: Fair       PROVISIONAL & DISCHARGE DIAGNOSES:        Active Hospital Problems    *Major depressive disorder, severe (HCC)        DISCHARGE DIAGNOSIS:   Axis I:  SEE ABOVE  Axis II: SEE ABOVE  Axis III: SEE ABOVE  Axis IV:  lack of structure  Axis V:  10 on admission, 55 on discharge     HISTORY OF PRESENT ILLNESS:  \"Suicidal ideation\"    Elvia Natarajan is a 64 y.o. White (non-) female with history of major depressive disorder and generalized anxiety disorder.  Admitted for worsening depression, anxiety and suicidal ideation.  Reports that she has been dealing with depression for many years but lately over the past few months she has been struggling a lot with depression and feeling her depression is getting out of control.  She was working as a  but has not been able to work since December 2024 due to her depression.  Has been feeling increasingly depressed specially over the past 3 to 4 weeks.  Has been feeling hopeless, helpless, was having suicidal ideation with no plan or intent.  Lacking motivation and energy to do things, losing interest in things she used to enjoy, has been withdrawn and isolative to herself, feeling fatigue and tired, difficulty with focus and concentration.  Mentioned having financial stress contributing to her depression, most recently she lost her dog suddenly about a week ago and denies any big trigger for her.     Also reported feeling anxious, worrying constantly, difficulty controlling her worry, ruminating a lot.  Denies panic attacks.   tablet  traZODone 50 MG tablet  venlafaxine 75 MG tablet                A coordinated, multidisplinary treatment team round was conducted with Elvia Natarajan---this is done daily here at [facility]. This team consists of the nurse, psychiatric unit pharmacist,  and writer.     I have spent greater than 35 minutes on discharge work.    Signed:  Candie Moncada MD  3/14/2025

## 2025-03-14 NOTE — BH NOTE
Met with patient lying down resting in bed, Flat affect. Euthymic mood. Denies depression. Denies anxiety. Denies SI, HI and AVH. CSSRS No risk. Compliant with medications. No side effects reported. Sleeping and eating well. Remains on Q15 minute rounds for safety.

## 2025-03-14 NOTE — PLAN OF CARE
Pt received at 1930, Alert, Oriented X4, and Cooperative with Constricted affect. Pt's vital signs were T 99 °F (37.2 °C), HR 71, RR 16, /63, and O2 98 %. Pt denied pain, denied anxiety and endorsed difficulty sleeping. Pt replied no to suicidal ideation, No to homicidal ideation, and No to hallucinations. Pt had speech that was Clear and Unremarkable in quality. Pt provided with 1:1 RN assessment with active listening and therapeutic presence for a brief period in the evening. Pt encouraged to participate in the evening milieu including leisure activities, snack time, and positive peer interaction. Pt cooperated with all scheduled medications and received PRN Desyrel for sleep. Pt monitored q15min throughout the evening and continuously 1:1 while using her CPAP overnight. Pt was visible but not social in the milieu throughout the evening and rested in bed for 8 hours overnight.    Problem: Depression  Goal: Will be euthymic at discharge  Description: INTERVENTIONS:  1. Administer medication as ordered  2. Provide emotional support via 1:1 interaction with staff  3. Encourage involvement in milieu/groups/activities  4. Monitor for social isolation  3/13/2025 2046 by Jenny Reid, RN  Outcome: Not Progressing     Problem: Pain  Goal: Verbalizes/displays adequate comfort level or baseline comfort level  Outcome: Progressing

## 2025-06-16 ENCOUNTER — TELEPHONE (OUTPATIENT)
Dept: PRIMARY CARE CLINIC | Facility: CLINIC | Age: 65
End: 2025-06-16

## 2025-07-08 ENCOUNTER — OFFICE VISIT (OUTPATIENT)
Dept: PRIMARY CARE CLINIC | Facility: CLINIC | Age: 65
End: 2025-07-08
Payer: COMMERCIAL

## 2025-07-08 VITALS
SYSTOLIC BLOOD PRESSURE: 100 MMHG | RESPIRATION RATE: 14 BRPM | HEART RATE: 74 BPM | OXYGEN SATURATION: 97 % | DIASTOLIC BLOOD PRESSURE: 70 MMHG | WEIGHT: 116.4 LBS | HEIGHT: 65 IN | TEMPERATURE: 97.5 F | BODY MASS INDEX: 19.39 KG/M2

## 2025-07-08 DIAGNOSIS — Z76.89 ENCOUNTER TO ESTABLISH CARE: Primary | ICD-10-CM

## 2025-07-08 DIAGNOSIS — F32.2 MAJOR DEPRESSIVE DISORDER, SEVERE (HCC): ICD-10-CM

## 2025-07-08 DIAGNOSIS — G31.84 MILD COGNITIVE IMPAIRMENT: ICD-10-CM

## 2025-07-08 DIAGNOSIS — E46 PROTEIN-CALORIE MALNUTRITION, UNSPECIFIED SEVERITY: ICD-10-CM

## 2025-07-08 DIAGNOSIS — G47.33 OSA ON CPAP: ICD-10-CM

## 2025-07-08 DIAGNOSIS — Z12.4 CERVICAL CANCER SCREENING: ICD-10-CM

## 2025-07-08 DIAGNOSIS — F41.1 GENERALIZED ANXIETY DISORDER: ICD-10-CM

## 2025-07-08 DIAGNOSIS — S40.812A ABRASION OF LEFT UPPER EXTREMITY, INITIAL ENCOUNTER: ICD-10-CM

## 2025-07-08 DIAGNOSIS — Z59.819 HOUSING INSTABILITY: ICD-10-CM

## 2025-07-08 DIAGNOSIS — F10.21 RECOVERING ALCOHOLIC (HCC): ICD-10-CM

## 2025-07-08 DIAGNOSIS — R11.2 NAUSEA AND VOMITING, UNSPECIFIED VOMITING TYPE: ICD-10-CM

## 2025-07-08 PROCEDURE — 99204 OFFICE O/P NEW MOD 45 MIN: CPT

## 2025-07-08 RX ORDER — CALCIUM CARBONATE 500(1250)
500 TABLET ORAL 2 TIMES DAILY
COMMUNITY

## 2025-07-08 RX ORDER — ONDANSETRON 4 MG/1
4 TABLET, FILM COATED ORAL DAILY PRN
Qty: 30 TABLET | Refills: 2 | Status: SHIPPED | OUTPATIENT
Start: 2025-07-08

## 2025-07-08 RX ORDER — HYDROXYZINE HYDROCHLORIDE 50 MG/1
50 TABLET, FILM COATED ORAL 3 TIMES DAILY PRN
COMMUNITY

## 2025-07-08 RX ORDER — DOXYCYCLINE HYCLATE 100 MG
100 TABLET ORAL 2 TIMES DAILY
Qty: 14 TABLET | Refills: 0 | Status: SHIPPED | OUTPATIENT
Start: 2025-07-08 | End: 2025-07-15

## 2025-07-08 RX ORDER — M-VIT,TX,IRON,MINS/CALC/FOLIC 27MG-0.4MG
1 TABLET ORAL 2 TIMES DAILY
COMMUNITY

## 2025-07-08 SDOH — ECONOMIC STABILITY: FOOD INSECURITY: WITHIN THE PAST 12 MONTHS, YOU WORRIED THAT YOUR FOOD WOULD RUN OUT BEFORE YOU GOT MONEY TO BUY MORE.: SOMETIMES TRUE

## 2025-07-08 SDOH — ECONOMIC STABILITY - HOUSING INSECURITY: HOUSING INSTABILITY UNSPECIFIED: Z59.819

## 2025-07-08 SDOH — ECONOMIC STABILITY: FOOD INSECURITY: WITHIN THE PAST 12 MONTHS, THE FOOD YOU BOUGHT JUST DIDN'T LAST AND YOU DIDN'T HAVE MONEY TO GET MORE.: SOMETIMES TRUE

## 2025-07-08 ASSESSMENT — ENCOUNTER SYMPTOMS
SHORTNESS OF BREATH: 0
DIARRHEA: 0
COUGH: 0
ABDOMINAL PAIN: 0
NAUSEA: 1
VOMITING: 1
WHEEZING: 0
BLOOD IN STOOL: 0
CONSTIPATION: 0

## 2025-07-08 ASSESSMENT — PATIENT HEALTH QUESTIONNAIRE - PHQ9
SUM OF ALL RESPONSES TO PHQ QUESTIONS 1-9: 17
3. TROUBLE FALLING OR STAYING ASLEEP: MORE THAN HALF THE DAYS
8. MOVING OR SPEAKING SO SLOWLY THAT OTHER PEOPLE COULD HAVE NOTICED. OR THE OPPOSITE, BEING SO FIGETY OR RESTLESS THAT YOU HAVE BEEN MOVING AROUND A LOT MORE THAN USUAL: NOT AT ALL
SUM OF ALL RESPONSES TO PHQ QUESTIONS 1-9: 17
7. TROUBLE CONCENTRATING ON THINGS, SUCH AS READING THE NEWSPAPER OR WATCHING TELEVISION: NEARLY EVERY DAY
1. LITTLE INTEREST OR PLEASURE IN DOING THINGS: MORE THAN HALF THE DAYS
SUM OF ALL RESPONSES TO PHQ QUESTIONS 1-9: 17
9. THOUGHTS THAT YOU WOULD BE BETTER OFF DEAD, OR OF HURTING YOURSELF: NOT AT ALL
10. IF YOU CHECKED OFF ANY PROBLEMS, HOW DIFFICULT HAVE THESE PROBLEMS MADE IT FOR YOU TO DO YOUR WORK, TAKE CARE OF THINGS AT HOME, OR GET ALONG WITH OTHER PEOPLE: VERY DIFFICULT
5. POOR APPETITE OR OVEREATING: MORE THAN HALF THE DAYS
6. FEELING BAD ABOUT YOURSELF - OR THAT YOU ARE A FAILURE OR HAVE LET YOURSELF OR YOUR FAMILY DOWN: NEARLY EVERY DAY
2. FEELING DOWN, DEPRESSED OR HOPELESS: MORE THAN HALF THE DAYS
4. FEELING TIRED OR HAVING LITTLE ENERGY: NEARLY EVERY DAY
SUM OF ALL RESPONSES TO PHQ QUESTIONS 1-9: 17

## 2025-07-08 NOTE — PROGRESS NOTES
Have you been to the ER, urgent care clinic since your last visit?  Hospitalized since your last visit?   In Chart     Have you seen or consulted any other health care providers outside our system since your last visit?   Psych [facility]   Ortho       Have you had a mammogram?”   Scheduled     Date of last Mammogram: 5/1/2023      “Have you had a pap smear?”    Needed     No cervical cancer screening on file       “Have you had a colorectal cancer screening such as a colonoscopy/FIT/Cologuard?    YES - Type: Cologuard     No colonoscopy on file  No cologuard on file  No FIT/FOBT on file   No flexible sigmoidoscopy on file            
35.0 - 47.0 % Final   • MCV 03/07/2025 104.5 (H)  80.0 - 99.0 FL Final   • MCH 03/07/2025 34.9 (H)  26.0 - 34.0 PG Final   • MCHC 03/07/2025 33.4  30.0 - 36.5 g/dL Final   • RDW 03/07/2025 14.0  11.5 - 14.5 % Final   • Platelets 03/07/2025 242  150 - 400 K/uL Final   • MPV 03/07/2025 10.7  8.9 - 12.9 FL Final   • Nucleated RBCs 03/07/2025 0.0  0  WBC Final   • nRBC 03/07/2025 0.00  0.00 - 0.01 K/uL Final   • Neutrophils % 03/07/2025 88.9 (H)  32.0 - 75.0 % Final   • Lymphocytes % 03/07/2025 6.4 (L)  12.0 - 49.0 % Final   • Monocytes % 03/07/2025 3.0 (L)  5.0 - 13.0 % Final   • Eosinophils % 03/07/2025 0.1  0.0 - 7.0 % Final   • Basophils % 03/07/2025 0.7  0.0 - 1.0 % Final   • Immature Granulocytes % 03/07/2025 0.9 (H)  0.0 - 0.5 % Final   • Neutrophils Absolute 03/07/2025 10.40 (H)  1.80 - 8.00 K/UL Final   • Lymphocytes Absolute 03/07/2025 0.75 (L)  0.80 - 3.50 K/UL Final   • Monocytes Absolute 03/07/2025 0.35  0.00 - 1.00 K/UL Final   • Eosinophils Absolute 03/07/2025 0.01  0.00 - 0.40 K/UL Final   • Basophils Absolute 03/07/2025 0.08  0.00 - 0.10 K/UL Final   • Immature Granulocytes Absolute 03/07/2025 0.11 (H)  0.00 - 0.04 K/UL Final   • Differential Type 03/07/2025 SMEAR SCANNED    Final   • RBC Comment 03/07/2025 NORMOCYTIC, NORMOCHROMIC    Final   • Sodium 03/07/2025 140  136 - 145 mmol/L Final   • Potassium 03/07/2025 3.6  3.5 - 5.1 mmol/L Final   • Chloride 03/07/2025 100  97 - 108 mmol/L Final   • CO2 03/07/2025 28  21 - 32 mmol/L Final   • Anion Gap 03/07/2025 12  2 - 12 mmol/L Final    PLEASE NOTE NEW REFERENCE RANGE   • Glucose 03/07/2025 140 (H)  65 - 100 mg/dL Final   • BUN 03/07/2025 9  6 - 20 MG/DL Final   • Creatinine 03/07/2025 0.59  0.55 - 1.02 MG/DL Final   • BUN/Creatinine Ratio 03/07/2025 15  12 - 20   Final   • Est, Glom Filt Rate 03/07/2025 >90  >60 ml/min/1.73m2 Final    Comment:    Pediatric calculator link: https://www.kidney.org/professionals/kdoqi/gfr_calculatorped     These

## 2025-07-08 NOTE — PATIENT INSTRUCTIONS
I recommend getting the following vaccine(s) at your local pharmacy: Prevnar-20       [facility] Housing Resources*  (Call 211 for more resources)    Homeless Connection Line (Local)  What they offer: The line facilitates access to resources and shelter alternatives for those who are currently homeless or 3 days or less away from losing their housing. They also provide information for the inclement weather shelters when available.  Areas served: [facility], [facility], [facility], [place],  [place], and the [facility]  Website: https://[employer]co[employer]/  Phone Number: [phone] **Contact this number first. It is a centralized point of entry for services.  Hours of Operation: Monday - Friday 8AM - 12PM & 1PM-9PM; Saturday & Sunday 1PM - 9PM  [facility]  What they offer: Assistance finding affordable, high quality housing for those who qualify.  Address: [address], [facility] [address] [address]  Website: [email or web address]  Phone Number: 105.479.2023  Hours of Operation: Monday - Friday 8:30AM - 5PM  Senior Apartments through [facility]: [email or web address]/find-[email or web address]/senior-communities/  [facility] Homeless Services ([place])  What they offer: [facility] serves as the coordinated point of entry for households experiencing a housing crisis in the [facility]. It provides referral and community resources, homelessness prevention, and rapid rehousing services.  Areas served: [facility], [place], and [place]; [facility], [place], [place], [place], [place], and [place].  Phone Number: [phone], dial 0 to leave a VM and request a return call  [employer]  What they offer: [facility] is the regional planning and coordinating agency for homeless services.  Website: https://www.[employer].org/get-help  [facility] Sheets - Street Sheets are a

## 2025-07-11 ENCOUNTER — HOSPITAL ENCOUNTER (OUTPATIENT)
Facility: HOSPITAL | Age: 65
Discharge: HOME | End: 2025-07-14
Payer: COMMERCIAL

## 2025-07-11 VITALS — WEIGHT: 116 LBS | BODY MASS INDEX: 19.33 KG/M2 | HEIGHT: 65 IN

## 2025-07-11 DIAGNOSIS — Z12.31 VISIT FOR SCREENING MAMMOGRAM: ICD-10-CM

## 2025-07-11 PROCEDURE — 77063 BREAST TOMOSYNTHESIS BI: CPT

## 2025-07-12 LAB
25(OH)D3+25(OH)D2 SERPL-MCNC: 44.2 NG/ML (ref 30–100)
ALBUMIN SERPL-MCNC: 3.7 G/DL (ref 3.9–4.9)
ALP SERPL-CCNC: 91 IU/L (ref 44–121)
ALT SERPL-CCNC: 64 IU/L (ref 0–32)
AST SERPL-CCNC: 62 IU/L (ref 0–40)
BASOPHILS # BLD AUTO: 0 X10E3/UL (ref 0–0.2)
BASOPHILS NFR BLD AUTO: 0 %
BILIRUB SERPL-MCNC: 0.6 MG/DL (ref 0–1.2)
BUN SERPL-MCNC: 12 MG/DL (ref 8–27)
BUN/CREAT SERPL: 24 (ref 12–28)
CALCIUM SERPL-MCNC: 8.4 MG/DL (ref 8.7–10.3)
CHLORIDE SERPL-SCNC: 105 MMOL/L (ref 96–106)
CHOLEST SERPL-MCNC: 122 MG/DL (ref 100–199)
CO2 SERPL-SCNC: 19 MMOL/L (ref 20–29)
CREAT SERPL-MCNC: 0.49 MG/DL (ref 0.57–1)
EGFRCR SERPLBLD CKD-EPI 2021: 105 ML/MIN/1.73
EOSINOPHIL # BLD AUTO: 0 X10E3/UL (ref 0–0.4)
EOSINOPHIL NFR BLD AUTO: 1 %
ERYTHROCYTE [DISTWIDTH] IN BLOOD BY AUTOMATED COUNT: 12.6 % (ref 11.7–15.4)
GLOBULIN SER CALC-MCNC: 1.7 G/DL (ref 1.5–4.5)
GLUCOSE SERPL-MCNC: 82 MG/DL (ref 70–99)
HCT VFR BLD AUTO: 40.5 % (ref 34–46.6)
HDLC SERPL-MCNC: 66 MG/DL
HGB BLD-MCNC: 13.6 G/DL (ref 11.1–15.9)
IMM GRANULOCYTES # BLD AUTO: 0.1 X10E3/UL (ref 0–0.1)
IMM GRANULOCYTES NFR BLD AUTO: 1 %
IRON SATN MFR SERPL: 32 % (ref 15–55)
IRON SERPL-MCNC: 85 UG/DL (ref 27–139)
LDLC SERPL CALC-MCNC: 43 MG/DL (ref 0–99)
LYMPHOCYTES # BLD AUTO: 1.5 X10E3/UL (ref 0.7–3.1)
LYMPHOCYTES NFR BLD AUTO: 24 %
MCH RBC QN AUTO: 35.5 PG (ref 26.6–33)
MCHC RBC AUTO-ENTMCNC: 33.6 G/DL (ref 31.5–35.7)
MCV RBC AUTO: 106 FL (ref 79–97)
MONOCYTES # BLD AUTO: 0.2 X10E3/UL (ref 0.1–0.9)
MONOCYTES NFR BLD AUTO: 4 %
NEUTROPHILS # BLD AUTO: 4.2 X10E3/UL (ref 1.4–7)
NEUTROPHILS NFR BLD AUTO: 70 %
PLATELET # BLD AUTO: 188 X10E3/UL (ref 150–450)
POTASSIUM SERPL-SCNC: 3.9 MMOL/L (ref 3.5–5.2)
PROT SERPL-MCNC: 5.4 G/DL (ref 6–8.5)
RBC # BLD AUTO: 3.83 X10E6/UL (ref 3.77–5.28)
SODIUM SERPL-SCNC: 137 MMOL/L (ref 134–144)
TIBC SERPL-MCNC: 269 UG/DL (ref 250–450)
TRIGL SERPL-MCNC: 61 MG/DL (ref 0–149)
TSH SERPL DL<=0.005 MIU/L-ACNC: 0.99 UIU/ML (ref 0.45–4.5)
UIBC SERPL-MCNC: 184 UG/DL (ref 118–369)
VIT B12 SERPL-MCNC: 491 PG/ML (ref 232–1245)
VLDLC SERPL CALC-MCNC: 13 MG/DL (ref 5–40)
WBC # BLD AUTO: 6 X10E3/UL (ref 3.4–10.8)

## 2025-07-14 NOTE — INTERDISCIPLINARY ROUNDS
Behavioral Health Interdisciplinary Rounds     Patient Name: Elvia Natarajan  Age: 64 y.o.  Room/Bed:  [facility]/  Primary Diagnosis: MDD (major depressive disorder), recurrent episode, mild (HCC)   Admission Status: Voluntary    Readmission within 30 days: No  Power of  in place: No  Patient requires a blocked bed: No          Reason for blocked bed: n/a  Sleep hours: 7+       Flu Vaccine: No  Participation in Care/Groups:  Yes  Medication Compliant?: Yes  PRNS (last 24 hours): trazodone   Restraints (last 24 hours):  No  __________________________________________________    24 hour chart check complete: Yes    _______________________________________________    Patient goal(s) for today: meet with treatment team  Treatment team focus/goals: plan to continue to titrate her medications   Progress note: She has been sleeping better - still has thoughts of wanting to die , but can contract for safety - she has been compliant with her treatment     Spiritual Care Consult: no  Financial concerns/prescription coverage:  [employer]   Family contact:  friends                       Family requesting physician contact today:    Discharge plan: she will return to her home   Access to weapons :   no                                                           Outpatient provider(s): John Stevenson NP - Maria Fernanda Rasmussen, therapist [phone]     LOS:  4  Expected LOS: TBD     Participating treatment team members: Elvia KrishnamurthyonMargaret SW- Dr. Jean-Claude Mccall, PharmD. - Rere Weathers,RN    Patient needs nohelia 2 reader

## 2025-07-15 ENCOUNTER — TELEPHONE (OUTPATIENT)
Dept: PRIMARY CARE CLINIC | Facility: CLINIC | Age: 65
End: 2025-07-15

## 2025-07-17 ENCOUNTER — TELEPHONE (OUTPATIENT)
Dept: PRIMARY CARE CLINIC | Facility: CLINIC | Age: 65
End: 2025-07-17

## 2025-08-13 ENCOUNTER — TELEPHONE (OUTPATIENT)
Dept: PRIMARY CARE CLINIC | Facility: CLINIC | Age: 65
End: 2025-08-13

## (undated) DEVICE — STOCKINETTE: Brand: DEROYAL

## (undated) DEVICE — INTENDED FOR TISSUE SEPARATION, AND OTHER PROCEDURES THAT REQUIRE A SHARP SURGICAL BLADE TO PUNCTURE OR CUT.: Brand: BARD-PARKER ® CARBON RIB-BACK BLADES

## (undated) DEVICE — BNDG ELAS HK LOOP 4X5YD NS -- MATRIX

## (undated) DEVICE — BLADE SAW OSC COARSE 25X9MM --

## (undated) DEVICE — SUTURE VCRL 2-0 L27IN ABSRB UD PS-2 L19MM 1/2 CIR J428H

## (undated) DEVICE — GUARD PIN DIA0.062IN GRN REM SL FOR K WIRE STNMN

## (undated) DEVICE — EXTREMITY - SMH: Brand: MEDLINE INDUSTRIES, INC.

## (undated) DEVICE — DRAPE SURG W41XL74IN CLR FULL SZ C ARM 3 ADH POLY STRP E

## (undated) DEVICE — BANDAGE,GAUZE,CONFORMING,3"X75",STRL,LF: Brand: MEDLINE

## (undated) DEVICE — GLOVE ORANGE PI 7 1/2   MSG9075

## (undated) DEVICE — DRSG GZ OIL EMUL CURAD 3X3 --

## (undated) DEVICE — Device

## (undated) DEVICE — WIRE FIX L5IN DIA0045IN DBL END TRCR

## (undated) DEVICE — GUIDEWIRE ORTH DIA0.034IN W/ TRCR TIP LSR MRK DISP FOR MIC

## (undated) DEVICE — BLADE SAW W55XL18MM THK051MM CUT THK056MM REPL S STL SAG

## (undated) DEVICE — Device: Brand: JELCO

## (undated) DEVICE — PADDING CST CRMPD 3INX4YD NS --

## (undated) DEVICE — DISPOSABLE TOURNIQUET CUFF SINGLE BLADDER, DUAL PORT AND QUICK CONNECT CONNECTOR: Brand: COLOR CUFF

## (undated) DEVICE — STRIP,CLOSURE,WOUND,MEDI-STRIP,1/2X4: Brand: MEDLINE

## (undated) DEVICE — BANDAGE,ELASTIC,ESMARK,STERILE,4"X9',LF: Brand: MEDLINE

## (undated) DEVICE — SYRINGE,EAR/ULCER, 2 OZ, STERILE: Brand: MEDLINE

## (undated) DEVICE — SUTURE MCRYL SZ 4-0 L27IN ABSRB UD L19MM PS-2 1/2 CIR PRIM Y426H

## (undated) DEVICE — COVER LT HNDL PLAS RIG 1 PER PK

## (undated) DEVICE — BANDAGE COMPR ELASTIC 5 YDX6 IN

## (undated) DEVICE — PREP SKN CHLRAPRP APL 26ML STR --

## (undated) DEVICE — GUIDEWIRE ORTH DIA0.045IN S STL TRCR TIP LSR LN QUICKFIX

## (undated) DEVICE — SOLUTION IRRIG 1000ML 0.9% SOD CHL USP POUR PLAS BTL

## (undated) DEVICE — IMPLANTABLE DEVICE
Type: IMPLANTABLE DEVICE | Site: FOOT | Status: NON-FUNCTIONAL
Removed: 2022-03-11

## (undated) DEVICE — SUTURE VCRL SZ 3-0 L27IN ABSRB UD FS-2 L19MM 1/2 CIR J423H

## (undated) DEVICE — BIT DRL PROF FOR MIC COMPR FULL THRD SCR

## (undated) DEVICE — SUTURE ETHLN SZ 4-0 L18IN NONABSORBABLE BLK L19MM PS-2 3/8 1667H

## (undated) DEVICE — HYPODERMIC SAFETY NEEDLE: Brand: MONOJECT

## (undated) DEVICE — PADDING CAST W3INXL4YD POLY POR SPUN DACRON SYN VERSATILE

## (undated) DEVICE — SYR 10ML LUER LOK 1/5ML GRAD --

## (undated) DEVICE — ZIMMER® STERILE DISPOSABLE TOURNIQUET CUFF WITH PLC, DUAL PORT, SINGLE BLADDER, 24 IN. (61 CM)

## (undated) DEVICE — GUIDEWIRE ORTH DIA0.045IN W/ TRCR TIP LSR LN

## (undated) DEVICE — NEEDLE HYPO 25GA L1.5IN BVL ORIENTED ECLIPSE

## (undated) DEVICE — SUTURE VCRL SZ 4-0 L27IN ABSRB UD L19MM FS-2 3/8 CIR REV J422H